# Patient Record
Sex: MALE | Race: WHITE | NOT HISPANIC OR LATINO | Employment: OTHER | URBAN - METROPOLITAN AREA
[De-identification: names, ages, dates, MRNs, and addresses within clinical notes are randomized per-mention and may not be internally consistent; named-entity substitution may affect disease eponyms.]

---

## 2017-01-25 RX ORDER — ASPIRIN 81 MG/1
TABLET ORAL
Qty: 30 TABLET | Refills: 0 | Status: SHIPPED | OUTPATIENT
Start: 2017-01-25 | End: 2017-03-28 | Stop reason: SDUPTHER

## 2017-02-13 RX ORDER — TAMSULOSIN HYDROCHLORIDE 0.4 MG/1
CAPSULE ORAL
Qty: 30 CAPSULE | Refills: 0 | Status: SHIPPED | OUTPATIENT
Start: 2017-02-13 | End: 2017-05-10

## 2017-03-27 RX ORDER — METOPROLOL TARTRATE 50 MG/1
TABLET, FILM COATED ORAL
Qty: 180 TABLET | Refills: 0 | Status: SHIPPED | OUTPATIENT
Start: 2017-03-27 | End: 2017-05-10 | Stop reason: SDUPTHER

## 2017-03-28 RX ORDER — ASPIRIN 81 MG/1
TABLET ORAL
Qty: 30 TABLET | Refills: 0 | Status: SHIPPED | OUTPATIENT
Start: 2017-03-28 | End: 2017-05-10 | Stop reason: SDUPTHER

## 2017-05-10 ENCOUNTER — OFFICE VISIT (OUTPATIENT)
Dept: FAMILY MEDICINE CLINIC | Facility: CLINIC | Age: 77
End: 2017-05-10

## 2017-05-10 VITALS
TEMPERATURE: 97.6 F | HEART RATE: 67 BPM | WEIGHT: 200.2 LBS | BODY MASS INDEX: 28.66 KG/M2 | SYSTOLIC BLOOD PRESSURE: 150 MMHG | OXYGEN SATURATION: 98 % | DIASTOLIC BLOOD PRESSURE: 90 MMHG | HEIGHT: 70 IN

## 2017-05-10 DIAGNOSIS — N40.0 BENIGN PROSTATIC HYPERPLASIA, PRESENCE OF LOWER URINARY TRACT SYMPTOMS UNSPECIFIED, UNSPECIFIED MORPHOLOGY: ICD-10-CM

## 2017-05-10 DIAGNOSIS — I10 ESSENTIAL HYPERTENSION: Primary | ICD-10-CM

## 2017-05-10 DIAGNOSIS — K21.9 GASTROESOPHAGEAL REFLUX DISEASE WITHOUT ESOPHAGITIS: ICD-10-CM

## 2017-05-10 DIAGNOSIS — J30.1 SEASONAL ALLERGIC RHINITIS DUE TO POLLEN: ICD-10-CM

## 2017-05-10 PROCEDURE — 99213 OFFICE O/P EST LOW 20 MIN: CPT | Performed by: FAMILY MEDICINE

## 2017-05-10 RX ORDER — CETIRIZINE HYDROCHLORIDE 10 MG/1
10 TABLET ORAL DAILY
Qty: 90 TABLET | Refills: 1 | Status: SHIPPED | OUTPATIENT
Start: 2017-05-10

## 2017-05-10 RX ORDER — TAMSULOSIN HYDROCHLORIDE 0.4 MG/1
1 CAPSULE ORAL DAILY
Qty: 90 CAPSULE | Refills: 1 | Status: SHIPPED | OUTPATIENT
Start: 2017-05-10 | End: 2017-12-21 | Stop reason: SDUPTHER

## 2017-05-10 RX ORDER — ASPIRIN 81 MG/1
81 TABLET ORAL DAILY
Qty: 90 TABLET | Refills: 1 | Status: SHIPPED | OUTPATIENT
Start: 2017-05-10 | End: 2017-11-27 | Stop reason: SDUPTHER

## 2017-05-10 RX ORDER — LISINOPRIL 20 MG/1
20 TABLET ORAL DAILY
Qty: 90 TABLET | Refills: 1 | Status: SHIPPED | OUTPATIENT
Start: 2017-05-10 | End: 2017-06-12

## 2017-05-10 RX ORDER — METOPROLOL TARTRATE 50 MG/1
50 TABLET, FILM COATED ORAL DAILY
Qty: 90 TABLET | Refills: 1 | Status: SHIPPED | OUTPATIENT
Start: 2017-05-10 | End: 2019-04-25 | Stop reason: SDUPTHER

## 2017-05-10 RX ORDER — OMEPRAZOLE 20 MG/1
20 CAPSULE, DELAYED RELEASE ORAL DAILY
Qty: 90 CAPSULE | Refills: 1 | Status: SHIPPED | OUTPATIENT
Start: 2017-05-10 | End: 2019-04-25

## 2017-06-10 ENCOUNTER — TELEPHONE (OUTPATIENT)
Dept: FAMILY MEDICINE CLINIC | Facility: CLINIC | Age: 77
End: 2017-06-10

## 2017-06-10 NOTE — TELEPHONE ENCOUNTER
Pt called and would like for his lisinopril 20mg once a day to be changed to 10mg BID. Can reach pt at [phone]. Send to lydia in [place] at [facility] 5/10/17

## 2017-06-12 RX ORDER — LISINOPRIL 10 MG/1
10 TABLET ORAL 2 TIMES DAILY
Qty: 60 TABLET | Refills: 5 | Status: SHIPPED | OUTPATIENT
Start: 2017-06-12 | End: 2019-04-25 | Stop reason: HOSPADM

## 2017-07-03 RX ORDER — METOPROLOL TARTRATE 50 MG/1
TABLET, FILM COATED ORAL
Qty: 180 TABLET | Refills: 0 | Status: SHIPPED | OUTPATIENT
Start: 2017-07-03 | End: 2017-10-01 | Stop reason: SDUPTHER

## 2017-09-14 RX ORDER — OMEPRAZOLE 20 MG/1
CAPSULE, DELAYED RELEASE ORAL
Qty: 90 CAPSULE | Refills: 0 | Status: SHIPPED | OUTPATIENT
Start: 2017-09-14 | End: 2019-04-25

## 2017-10-02 RX ORDER — METOPROLOL TARTRATE 50 MG/1
TABLET, FILM COATED ORAL
Qty: 180 TABLET | Refills: 0 | Status: SHIPPED | OUTPATIENT
Start: 2017-10-02 | End: 2019-04-25 | Stop reason: DRUGHIGH

## 2017-11-27 RX ORDER — ASPIRIN 81 MG/1
TABLET ORAL
Qty: 90 TABLET | Refills: 0 | Status: SHIPPED | OUTPATIENT
Start: 2017-11-27

## 2017-12-22 RX ORDER — TAMSULOSIN HYDROCHLORIDE 0.4 MG/1
CAPSULE ORAL
Qty: 90 CAPSULE | Refills: 0 | Status: SHIPPED | OUTPATIENT
Start: 2017-12-22 | End: 2019-04-25 | Stop reason: HOSPADM

## 2018-02-04 ENCOUNTER — HOSPITAL ENCOUNTER (OUTPATIENT)
Dept: GENERAL RADIOLOGY | Facility: HOSPITAL | Age: 78
Discharge: HOME | End: 2018-02-04
Attending: PHYSICAL MEDICINE & REHABILITATION | Admitting: PHYSICAL MEDICINE & REHABILITATION

## 2018-02-21 ENCOUNTER — HOSPITAL ENCOUNTER (OUTPATIENT)
Dept: OTHER | Facility: HOSPITAL | Age: 78
Setting detail: SPECIMEN
Discharge: HOME | End: 2018-02-21
Attending: UROLOGY | Admitting: UROLOGY

## 2018-12-21 ENCOUNTER — TRANSCRIBE ORDERS (OUTPATIENT)
Dept: ADMINISTRATIVE | Facility: HOSPITAL | Age: 78
End: 2018-12-21

## 2018-12-21 DIAGNOSIS — I65.23 CAROTID STENOSIS, BILATERAL: Primary | ICD-10-CM

## 2019-01-17 ENCOUNTER — HOSPITAL ENCOUNTER (OUTPATIENT)
Dept: MRI IMAGING | Facility: HOSPITAL | Age: 79
Discharge: HOME | End: 2019-01-17
Attending: SURGERY | Admitting: SURGERY

## 2019-01-17 ENCOUNTER — HOSPITAL ENCOUNTER (OUTPATIENT)
Dept: MRI IMAGING | Facility: HOSPITAL | Age: 79
Discharge: HOME | End: 2019-01-17
Attending: SURGERY

## 2019-01-17 DIAGNOSIS — I65.23 CAROTID STENOSIS, BILATERAL: ICD-10-CM

## 2019-01-17 PROCEDURE — 70544 MR ANGIOGRAPHY HEAD W/O DYE: CPT

## 2019-01-17 PROCEDURE — 70547 MR ANGIOGRAPHY NECK W/O DYE: CPT

## 2019-04-01 ENCOUNTER — HOSPITAL ENCOUNTER (EMERGENCY)
Facility: HOSPITAL | Age: 79
Discharge: HOME | End: 2019-04-01
Attending: EMERGENCY MEDICINE | Admitting: EMERGENCY MEDICINE

## 2019-04-01 ENCOUNTER — TELEPHONE (OUTPATIENT)
Dept: CARDIOLOGY | Facility: CLINIC | Age: 79
End: 2019-04-01

## 2019-04-01 ENCOUNTER — APPOINTMENT (OUTPATIENT)
Dept: CT IMAGING | Facility: HOSPITAL | Age: 79
End: 2019-04-01

## 2019-04-01 ENCOUNTER — APPOINTMENT (OUTPATIENT)
Dept: GENERAL RADIOLOGY | Facility: HOSPITAL | Age: 79
End: 2019-04-01

## 2019-04-01 VITALS
TEMPERATURE: 97.5 F | WEIGHT: 220 LBS | HEIGHT: 70 IN | SYSTOLIC BLOOD PRESSURE: 172 MMHG | DIASTOLIC BLOOD PRESSURE: 82 MMHG | OXYGEN SATURATION: 97 % | BODY MASS INDEX: 31.5 KG/M2 | HEART RATE: 70 BPM | RESPIRATION RATE: 15 BRPM

## 2019-04-01 DIAGNOSIS — R06.00 DYSPNEA, UNSPECIFIED TYPE: ICD-10-CM

## 2019-04-01 DIAGNOSIS — R11.2 NAUSEA AND VOMITING, INTRACTABILITY OF VOMITING NOT SPECIFIED, UNSPECIFIED VOMITING TYPE: Primary | ICD-10-CM

## 2019-04-01 LAB
ALBUMIN SERPL-MCNC: 4.3 G/DL (ref 3.5–5.2)
ALBUMIN/GLOB SERPL: 1.6 G/DL
ALP SERPL-CCNC: 67 U/L (ref 39–117)
ALT SERPL W P-5'-P-CCNC: 20 U/L (ref 1–41)
ANION GAP SERPL CALCULATED.3IONS-SCNC: 17 MMOL/L
AST SERPL-CCNC: 17 U/L (ref 1–40)
BACTERIA UR QL AUTO: NORMAL /HPF
BASOPHILS # BLD AUTO: 0.03 10*3/MM3 (ref 0–0.2)
BASOPHILS NFR BLD AUTO: 0.4 % (ref 0–1.5)
BILIRUB SERPL-MCNC: 0.6 MG/DL (ref 0.2–1.2)
BILIRUB UR QL STRIP: NEGATIVE
BUN BLD-MCNC: 24 MG/DL (ref 8–23)
BUN/CREAT SERPL: 18.3 (ref 7–25)
CALCIUM SPEC-SCNC: 9.2 MG/DL (ref 8.6–10.5)
CHLORIDE SERPL-SCNC: 107 MMOL/L (ref 98–107)
CLARITY UR: CLEAR
CO2 SERPL-SCNC: 20 MMOL/L (ref 22–29)
COLOR UR: YELLOW
CREAT BLD-MCNC: 1.31 MG/DL (ref 0.76–1.27)
DEPRECATED RDW RBC AUTO: 39.6 FL (ref 37–54)
EOSINOPHIL # BLD AUTO: 0.06 10*3/MM3 (ref 0–0.4)
EOSINOPHIL NFR BLD AUTO: 0.7 % (ref 0.3–6.2)
ERYTHROCYTE [DISTWIDTH] IN BLOOD BY AUTOMATED COUNT: 12.8 % (ref 12.3–15.4)
GFR SERPL CREATININE-BSD FRML MDRD: 53 ML/MIN/1.73
GLOBULIN UR ELPH-MCNC: 2.7 GM/DL
GLUCOSE BLD-MCNC: 169 MG/DL (ref 65–99)
GLUCOSE UR STRIP-MCNC: NEGATIVE MG/DL
HCT VFR BLD AUTO: 37.8 % (ref 37.5–51)
HGB BLD-MCNC: 13.4 G/DL (ref 13–17.7)
HGB UR QL STRIP.AUTO: NEGATIVE
HOLD SPECIMEN: NORMAL
HOLD SPECIMEN: NORMAL
HYALINE CASTS UR QL AUTO: NORMAL /LPF
IMM GRANULOCYTES # BLD AUTO: 0.04 10*3/MM3 (ref 0–0.05)
IMM GRANULOCYTES NFR BLD AUTO: 0.5 % (ref 0–0.5)
KETONES UR QL STRIP: NEGATIVE
LEUKOCYTE ESTERASE UR QL STRIP.AUTO: NEGATIVE
LIPASE SERPL-CCNC: 58 U/L (ref 13–60)
LYMPHOCYTES # BLD AUTO: 1.99 10*3/MM3 (ref 0.7–3.1)
LYMPHOCYTES NFR BLD AUTO: 23.8 % (ref 19.6–45.3)
MCH RBC QN AUTO: 30.7 PG (ref 26.6–33)
MCHC RBC AUTO-ENTMCNC: 35.4 G/DL (ref 31.5–35.7)
MCV RBC AUTO: 86.5 FL (ref 79–97)
MONOCYTES # BLD AUTO: 0.85 10*3/MM3 (ref 0.1–0.9)
MONOCYTES NFR BLD AUTO: 10.2 % (ref 5–12)
NEUTROPHILS # BLD AUTO: 5.4 10*3/MM3 (ref 1.4–7)
NEUTROPHILS NFR BLD AUTO: 64.4 % (ref 42.7–76)
NITRITE UR QL STRIP: NEGATIVE
NRBC BLD AUTO-RTO: 0.1 /100 WBC (ref 0–0)
NT-PROBNP SERPL-MCNC: 339.8 PG/ML (ref 5–1800)
PH UR STRIP.AUTO: 7 [PH] (ref 5–8)
PLATELET # BLD AUTO: 159 10*3/MM3 (ref 140–450)
PMV BLD AUTO: 11.6 FL (ref 6–12)
POTASSIUM BLD-SCNC: 3.9 MMOL/L (ref 3.5–5.2)
PROT SERPL-MCNC: 7 G/DL (ref 6–8.5)
PROT UR QL STRIP: ABNORMAL
RBC # BLD AUTO: 4.37 10*6/MM3 (ref 4.14–5.8)
RBC # UR: NORMAL /HPF
REF LAB TEST METHOD: NORMAL
SODIUM BLD-SCNC: 144 MMOL/L (ref 136–145)
SP GR UR STRIP: >=1.03 (ref 1–1.03)
SQUAMOUS #/AREA URNS HPF: NORMAL /HPF
TROPONIN T SERPL-MCNC: <0.01 NG/ML (ref 0–0.03)
UROBILINOGEN UR QL STRIP: ABNORMAL
WBC NRBC COR # BLD: 8.37 10*3/MM3 (ref 3.4–10.8)
WBC UR QL AUTO: NORMAL /HPF
WHOLE BLOOD HOLD SPECIMEN: NORMAL
WHOLE BLOOD HOLD SPECIMEN: NORMAL

## 2019-04-01 PROCEDURE — 85025 COMPLETE CBC W/AUTO DIFF WBC: CPT | Performed by: PHYSICIAN ASSISTANT

## 2019-04-01 PROCEDURE — 74177 CT ABD & PELVIS W/CONTRAST: CPT

## 2019-04-01 PROCEDURE — 80053 COMPREHEN METABOLIC PANEL: CPT | Performed by: PHYSICIAN ASSISTANT

## 2019-04-01 PROCEDURE — 93005 ELECTROCARDIOGRAM TRACING: CPT | Performed by: EMERGENCY MEDICINE

## 2019-04-01 PROCEDURE — 81001 URINALYSIS AUTO W/SCOPE: CPT | Performed by: PHYSICIAN ASSISTANT

## 2019-04-01 PROCEDURE — 83880 ASSAY OF NATRIURETIC PEPTIDE: CPT | Performed by: PHYSICIAN ASSISTANT

## 2019-04-01 PROCEDURE — 71045 X-RAY EXAM CHEST 1 VIEW: CPT

## 2019-04-01 PROCEDURE — 96374 THER/PROPH/DIAG INJ IV PUSH: CPT

## 2019-04-01 PROCEDURE — 83690 ASSAY OF LIPASE: CPT | Performed by: PHYSICIAN ASSISTANT

## 2019-04-01 PROCEDURE — 96376 TX/PRO/DX INJ SAME DRUG ADON: CPT

## 2019-04-01 PROCEDURE — 25010000002 IOPAMIDOL 61 % SOLUTION: Performed by: EMERGENCY MEDICINE

## 2019-04-01 PROCEDURE — 25010000002 ONDANSETRON PER 1 MG: Performed by: PHYSICIAN ASSISTANT

## 2019-04-01 PROCEDURE — 93005 ELECTROCARDIOGRAM TRACING: CPT

## 2019-04-01 PROCEDURE — 25010000002 ONDANSETRON PER 1 MG: Performed by: EMERGENCY MEDICINE

## 2019-04-01 PROCEDURE — 93010 ELECTROCARDIOGRAM REPORT: CPT | Performed by: INTERNAL MEDICINE

## 2019-04-01 PROCEDURE — 99284 EMERGENCY DEPT VISIT MOD MDM: CPT

## 2019-04-01 PROCEDURE — 84484 ASSAY OF TROPONIN QUANT: CPT | Performed by: PHYSICIAN ASSISTANT

## 2019-04-01 RX ORDER — ONDANSETRON 2 MG/ML
4 INJECTION INTRAMUSCULAR; INTRAVENOUS ONCE
Status: COMPLETED | OUTPATIENT
Start: 2019-04-01 | End: 2019-04-01

## 2019-04-01 RX ORDER — POTASSIUM CHLORIDE 750 MG/1
10 TABLET, FILM COATED, EXTENDED RELEASE ORAL DAILY
COMMUNITY

## 2019-04-01 RX ORDER — CLOPIDOGREL BISULFATE 75 MG/1
75 TABLET ORAL NIGHTLY
COMMUNITY
End: 2019-08-19 | Stop reason: SDUPTHER

## 2019-04-01 RX ORDER — ONDANSETRON 4 MG/1
4-8 TABLET, ORALLY DISINTEGRATING ORAL EVERY 8 HOURS PRN
Qty: 15 TABLET | Refills: 0 | Status: SHIPPED | OUTPATIENT
Start: 2019-04-01

## 2019-04-01 RX ORDER — PHENOL 1.4 %
600 AEROSOL, SPRAY (ML) MUCOUS MEMBRANE 2 TIMES DAILY WITH MEALS
COMMUNITY

## 2019-04-01 RX ADMIN — ONDANSETRON 4 MG: 2 INJECTION INTRAMUSCULAR; INTRAVENOUS at 17:50

## 2019-04-01 RX ADMIN — ONDANSETRON 4 MG: 2 INJECTION INTRAMUSCULAR; INTRAVENOUS at 20:51

## 2019-04-01 RX ADMIN — IOPAMIDOL 85 ML: 612 INJECTION, SOLUTION INTRAVENOUS at 19:11

## 2019-04-01 RX ADMIN — SODIUM CHLORIDE, POTASSIUM CHLORIDE, SODIUM LACTATE AND CALCIUM CHLORIDE 500 ML: 600; 310; 30; 20 INJECTION, SOLUTION INTRAVENOUS at 20:12

## 2019-04-01 NOTE — TELEPHONE ENCOUNTER
04/01/19  3:05 PM  Dave Peguero  1940  Home Phone [phone]   Mobile [phone]       Dave Peguero is a patient who will be new to our office. His daughter is calling in to get her father an apt to be seen [facility].  Daughter tells me that her father has a cardiologist in [place] and wants to change to our office.  Daughter would like the patient to be seen tomorrow as a new patient due to the patients inabilities to walk 15 feet with out becoming extremely SOA and dizzy.  She stated the patient is also having Nausea and vomiting.    The first new patient apt I have will be with GM on Wednesday, but I think this patient needs to be evaluated in the ER since he is so symptomatic.  Daughter agreeable.  She is going to keep the apt that was scheduled on 4/25/19 with Dr Galan and she will call back after patient has been seen in the ER and let us know if a sooner apt is needed.    Zara Siegel RN  Triage nurse

## 2019-04-01 NOTE — ED PROVIDER NOTES
"EMERGENCY DEPARTMENT ENCOUNTER    Room Number:  37/37  Date seen:  4/1/2019  Time seen: 5:29 PM  PCP: Kristin Krause MD  Historian: Patient, Family      HPI:  Chief Complaint: Nausea/Vomiting  Context: Dave Peguero is an 78 y.o. male with h/o atherosclerotic heart disease who presents to the ED c/o intermittent episodes of nausea and vomiting onset about 5 days ago. Pt states that his nausea worsens with lying supine and improves with sitting upright. Pt states that he has also had left-sided abdominal pain, intermittent episodes of mild chest pain ( a brief \"poking\" sensation intermittently x months), dyspnea, and lightheadedness for several weeks, which have been progressively worsening since initial onset. Pt denies diarrhea, palpitations, headache, focal weakness/numbness, speech/visual difficulties, diaphoresis, BLE edema, and bilateral/unilateral calf pain. Pt reports that his current chest pain and his other additional symptoms are dissimilar to his prior cardiac symptoms and his only symptom currently is nausea. Pt states that he called the [facility] office to establish a new cardiologist and was referred to the ER for further evaluation. There are no other complaints at this time.     Onset: Gradual in onset  Location: Gastrointestinal  Radiation: N/A  Duration: Onset about 5 days ago  Timing: Intermittent  Character: \"nausea/vomiting\"  Aggravating Factors: Nausea worsens with lying supine  Alleviating Factors: Nausea improves with sitting upright  Severity: Moderate            ALLERGIES  Benadryl [diphenhydramine]    PAST MEDICAL HISTORY  Active Ambulatory Problems     Diagnosis Date Noted   • No Active Ambulatory Problems     Resolved Ambulatory Problems     Diagnosis Date Noted   • No Resolved Ambulatory Problems     Past Medical History:   Diagnosis Date   • Arthritis    • Cancer (CMS/[facility])    • CHF (congestive heart failure) (CMS/[facility])    • Coronary artery disease    • Diabetes mellitus " (CMS/[facility])    • Hyperlipidemia    • Hypertension    • Kidney stone    • Myocardial infarction (CMS/[facility])        PAST SURGICAL HISTORY  Past Surgical History:   Procedure Laterality Date   • CORONARY ARTERY BYPASS GRAFT      TRIPLE   • DENTAL PROCEDURE      all teeth have been pulled   • FEMORAL ARTERY STENT Bilateral    • JOINT REPLACEMENT Right     knee   • PROSTATE SURGERY         FAMILY HISTORY  History reviewed. No pertinent family history.    SOCIAL HISTORY  Social History     Socioeconomic History   • Marital status:      Spouse name: Not on file   • Number of children: Not on file   • Years of education: Not on file   • Highest education level: Not on file   Tobacco Use   • Smoking status: Former Smoker     Packs/day: 1.00     Types: Cigarettes   • Smokeless tobacco: Never Used   Substance and Sexual Activity   • Alcohol use: No   • Drug use: No   • Sexual activity: Defer           REVIEW OF SYSTEMS  Review of Systems   Constitutional: Negative for chills and diaphoresis.   HENT: Negative for congestion, rhinorrhea and sore throat.    Eyes: Negative for pain and visual disturbance.   Respiratory: Positive for shortness of breath. Negative for cough.    Cardiovascular: Positive for chest pain. Negative for palpitations and leg swelling.   Gastrointestinal: Positive for abdominal pain, nausea and vomiting. Negative for diarrhea.   Genitourinary: Negative for difficulty urinating, dysuria, flank pain and frequency.   Musculoskeletal: Negative for myalgias, neck pain and neck stiffness.   Skin: Negative for rash.   Neurological: Positive for light-headedness. Negative for speech difficulty, weakness, numbness and headaches.   Psychiatric/Behavioral: Negative.    All other systems reviewed and are negative.          PHYSICAL EXAM  ED Triage Vitals   Temp Heart Rate Resp BP SpO2   04/01/19 1600 04/01/19 1600 04/01/19 1600 04/01/19 1700 04/01/19 1600   97.5 °F (36.4 °C) 83 16 171/94 97 %      Temp src Heart  Rate Source Patient Position BP Location FiO2 (%)   04/01/19 1600 04/01/19 1600 04/01/19 1700 04/01/19 1700 --   Tympanic Monitor Lying Right arm        Physical Exam   Constitutional: He is oriented to person, place, and time. No distress.   HENT:   Head: Normocephalic.   Mouth/Throat: Mucous membranes are normal.   Eyes: EOM are normal. Pupils are equal, round, and reactive to light.   Neck: Normal range of motion. Neck supple.   Cardiovascular: Normal rate, regular rhythm and normal heart sounds.   Pulmonary/Chest: Effort normal and breath sounds normal. No respiratory distress. He has no decreased breath sounds. He has no wheezes. He has no rhonchi. He has no rales.   Abdominal:   Soft  Nontender  Nondistended     Musculoskeletal: Normal range of motion.   Neurological: He is alert and oriented to person, place, and time. He has normal sensation.   Skin: Skin is warm and dry.   Psychiatric: Mood and affect normal.   Nursing note and vitals reviewed.          LAB RESULTS  Recent Results (from the past 24 hour(s))   Light Blue Top    Collection Time: 04/01/19  5:44 PM   Result Value Ref Range    Extra Tube hold for add-on    Green Top (Gel)    Collection Time: 04/01/19  5:44 PM   Result Value Ref Range    Extra Tube Hold for add-ons.    Lavender Top    Collection Time: 04/01/19  5:44 PM   Result Value Ref Range    Extra Tube hold for add-on    Gold Top - SST    Collection Time: 04/01/19  5:44 PM   Result Value Ref Range    Extra Tube Hold for add-ons.    Comprehensive Metabolic Panel    Collection Time: 04/01/19  5:44 PM   Result Value Ref Range    Glucose 169 (H) 65 - 99 mg/dL    BUN 24 (H) 8 - 23 mg/dL    Creatinine 1.31 (H) 0.76 - 1.27 mg/dL    Sodium 144 136 - 145 mmol/L    Potassium 3.9 3.5 - 5.2 mmol/L    Chloride 107 98 - 107 mmol/L    CO2 20.0 (L) 22.0 - 29.0 mmol/L    Calcium 9.2 8.6 - 10.5 mg/dL    Total Protein 7.0 6.0 - 8.5 g/dL    Albumin 4.30 3.50 - 5.20 g/dL    ALT (SGPT) 20 1 - 41 U/L    AST (SGOT)  17 1 - 40 U/L    Alkaline Phosphatase 67 39 - 117 U/L    Total Bilirubin 0.6 0.2 - 1.2 mg/dL    eGFR Non African Amer 53 (L) >60 mL/min/1.73    Globulin 2.7 gm/dL    A/G Ratio 1.6 g/dL    BUN/Creatinine Ratio 18.3 7.0 - 25.0    Anion Gap 17.0 mmol/L   Troponin    Collection Time: 04/01/19  5:44 PM   Result Value Ref Range    Troponin T <0.010 0.000 - 0.030 ng/mL   BNP    Collection Time: 04/01/19  5:44 PM   Result Value Ref Range    proBNP 339.8 5.0-1,800.0 pg/mL   Lipase    Collection Time: 04/01/19  5:44 PM   Result Value Ref Range    Lipase 58 13 - 60 U/L   CBC Auto Differential    Collection Time: 04/01/19  5:44 PM   Result Value Ref Range    WBC 8.37 3.40 - 10.80 10*3/mm3    RBC 4.37 4.14 - 5.80 10*6/mm3    Hemoglobin 13.4 13.0 - 17.7 g/dL    Hematocrit 37.8 37.5 - 51.0 %    MCV 86.5 79.0 - 97.0 fL    MCH 30.7 26.6 - 33.0 pg    MCHC 35.4 31.5 - 35.7 g/dL    RDW 12.8 12.3 - 15.4 %    RDW-SD 39.6 37.0 - 54.0 fl    MPV 11.6 6.0 - 12.0 fL    Platelets 159 140 - 450 10*3/mm3    Neutrophil % 64.4 42.7 - 76.0 %    Lymphocyte % 23.8 19.6 - 45.3 %    Monocyte % 10.2 5.0 - 12.0 %    Eosinophil % 0.7 0.3 - 6.2 %    Basophil % 0.4 0.0 - 1.5 %    Immature Grans % 0.5 0.0 - 0.5 %    Neutrophils, Absolute 5.40 1.40 - 7.00 10*3/mm3    Lymphocytes, Absolute 1.99 0.70 - 3.10 10*3/mm3    Monocytes, Absolute 0.85 0.10 - 0.90 10*3/mm3    Eosinophils, Absolute 0.06 0.00 - 0.40 10*3/mm3    Basophils, Absolute 0.03 0.00 - 0.20 10*3/mm3    Immature Grans, Absolute 0.04 0.00 - 0.05 10*3/mm3    nRBC 0.1 (H) 0.0 - 0.0 /100 WBC   Urinalysis With Microscopic If Indicated (No Culture) - Urine, Clean Catch    Collection Time: 04/01/19  7:37 PM   Result Value Ref Range    Color, UA Yellow Yellow, Straw    Appearance, UA Clear Clear    pH, UA 7.0 5.0 - 8.0    Specific Gravity, UA >=1.030 1.005 - 1.030    Glucose, UA Negative Negative    Ketones, UA Negative Negative    Bilirubin, UA Negative Negative    Blood, UA Negative Negative    Protein,  UA 30 mg/dL (1+) (A) Negative    Leuk Esterase, UA Negative Negative    Nitrite, UA Negative Negative    Urobilinogen, UA 1.0 E.U./dL 0.2 - 1.0 E.U./dL   Urinalysis, Microscopic Only - Urine, Clean Catch    Collection Time: 19  7:37 PM   Result Value Ref Range    RBC, UA 0-2 None Seen, 0-2 /HPF    WBC, UA 0-2 None Seen, 0-2 /HPF    Bacteria, UA None Seen None Seen /HPF    Squamous Epithelial Cells, UA 0-2 None Seen, 0-2 /HPF    Hyaline Casts, UA 0-2 None Seen /LPF    Methodology Automated Microscopy        I ordered the above labs and reviewed the results.        RADIOLOGY  CT Abdomen Pelvis With Contrast   Final Result    FINDINGS: The CT scan was performed as an emergency procedure through  the abdomen and pelvis with intravenous contrast and demonstrates the  followin. The lung bases are clear. The liver, spleen, pancreas, and both  adrenal glands are unremarkable. The gallbladder shows no gallstones or  wall thickening.  2. There is a partially exophytic solid-appearing mass involving the  upper pole of the left kidney measuring 1.7 x 1.7 x 2.4 cm. This is  highly suspicious for a renal cell carcinoma. The right kidney is  unremarkable.  3. There is no aortic aneurysm or retroperitoneal lymphadenopathy. There  appears to be slight narrowing at the origin of the celiac artery and  there is also very prominent calcification involving the superior  mesenteric artery resulting in moderate segmental narrowing.  4. The large and small bowel loops are normal in caliber. There is no  evidence of obstruction or inflammatory change. The appendix appears  normal. In the pelvis there is enlargement of the prostate measuring 6.5  cm.      XR Chest 1 View   Final Result    Compared to the previous chest x-ray dated 2014.     The lungs are somewhat poorly inflated, but appear free of infiltrates.  There are no pleural effusions. The heart is normal in size. Stigmata of  previous coronary artery bypass  procedure are now evident.     IMPRESSIONS: No evidence of acute disease within the chest.          I ordered the above noted radiological studies and reviewed the images on the PACS system.  Spoke with Dr. Porter (radiologist) regarding CT/MRI scan results.        MEDICATIONS GIVEN IN ER  Medications   ondansetron (ZOFRAN) injection 4 mg (4 mg Intravenous Given 4/1/19 1750)   iopamidol (ISOVUE-300) 61 % injection 100 mL (85 mL Intravenous Given by Other 4/1/19 1911)   lactated ringers bolus 500 mL (0 mL Intravenous Stopped 4/1/19 2047)   ondansetron (ZOFRAN) injection 4 mg (4 mg Intravenous Given 4/1/19 2051)           EKG  Interpreted by Dr. Gu (ER physician). Please see his documentation for the interpretation.         PROCEDURES  Procedures          PROGRESS AND CONSULTS    Progress Notes:          5:51 PM:  UA, blood work, CXR, and CT Abd ordered for further evaluation. Zofran ordered to treat for nausea.     7:50 PM:  Reviewed pt's history and workup with Dr. Gu (ER physician).  After a bedside evaluation, Dr. Gu agrees with the plan of care.    7:58 PM:  Rechecked pt. Pt is resting comfortably and appears in no acute distress. Pt denies vomiting in the ER and reports that he feels better after administration of Zofran. Informed pt that his UA is negative for acute UTI. Pt's troponin is negative. CT Abd is negative acute. However, there is incidental finding of a solid-appearing mass involving the upper pole of the left kidney present on pt's CT Abd -> Pt states that he is aware of this and is currently being followed by his urologist for this. Pt was informed of the limitations of the studies ordered in the ER. We cannot r/o all possible causes of pt's symptoms in the ER. Pt reports that he understands this and would like to f/u as an outpatient for his symptoms. Pt was strongly advised to f/u with his PMD/cardiologist in the office. Pt will be prescribed with rx for antiemetic. Strict RTER  "warnings given. Pt agrees with plan for discharge.     8:00 PM:  IV fluids ordered to hydrate pt.     Pt's O2 sat is 97% on room air.        Disposition vitals:  /82   Pulse 70   Temp 97.5 °F (36.4 °C) (Tympanic)   Resp 15   Ht 176.5 cm (69.5\")   Wt 99.8 kg (220 lb)   SpO2 97%   BMI 32.02 kg/m²           DIAGNOSIS  Final diagnoses:   Nausea and vomiting, intractability of vomiting not specified, unspecified vomiting type   Dyspnea, unspecified type           DISPOSITION  Pt discharged.    DISCHARGE    Patient discharged in stable condition.    Reviewed implications of results, diagnosis, meds, responsibility to follow up, warning signs and symptoms of possible worsening, potential complications and reasons to return to ER.    Patient/Family voiced understanding of above instructions.    Discussed plan for discharge, as there is no emergent indication for admission. Pt/family is agreeable and understands need for follow up and repeat testing. Pt is aware that discharge does not mean that nothing is wrong but it indicates no emergency is present that requires admission and they must continue care with follow-up as given below or physician of their choice.     FOLLOW-UP  Kristin Krause MD  [address]  [facility] [address]  [place] [facility]  [phone]    In 2 days      Abraham Galan MD  [address] [facility] [address]  [place] [id number] [phone]      as scheduled, sooner as needed.         Medication List      New Prescriptions    ondansetron ODT 4 MG disintegrating tablet  Commonly known as:  ZOFRAN-ODT  Take 1-2 tablets by mouth Every 8 (Eight) Hours As Needed for Nausea or   Vomiting.        Changed    * metoprolol tartrate 50 MG tablet  Commonly known as:  LOPRESSOR  Take 1 tablet by mouth Daily.  What changed:    when to take this  reasons to take this     * metoprolol tartrate 50 MG tablet  Commonly known as:  LOPRESSOR  TAKE ONE TABLET BY MOUTH TWICE DAILY  What changed:  Another medication with " the same name was changed. Make   sure you understand how and when to take each.         * This list has 2 medication(s) that are the same as other medications   prescribed for you. Read the directions carefully, and ask your doctor or   other care provider to review them with you.                  Documentation assistance provided by cindi Izquierdo for Ms. Bess Brumfield PA-C.  Information recorded by the scribe was done at my direction and has been verified and validated by me.           Tung Izquierdo  04/01/19 [id number]       Bess Brumfield PA  04/02/19 [id number]

## 2019-04-02 NOTE — DISCHARGE INSTRUCTIONS
Stay well-hydrated.  Recheck with your PCP in 1-2 days.  Take the medications as prescribed.  Return to the ER for severe pain, intractable vomiting, fever >100.4, worsening shortness of breath, chest pain, new or worsening symptoms, any concerns.

## 2019-04-02 NOTE — ED PROVIDER NOTES
Pt is a 78 y.o. male who presents to the ED complaining of N/V that began 5 days ago, worse today. Pt states his nausea has been constant. Pt hasn't been taking anything for nausea. Pt also c/o SOA, VALERA, and dizziness (unsteady when walking). Pt admits to not drinking as much as he should. Pt denies any CP currently. Dx with DM last year prior to MI. Pt states he doesn't check his glucose levels.     On exam,  GENERAL: not distressed  HENT: nares patent  EYES: no scleral icterus  CV: regular rhythm, regular rate  RESPIRATORY: normal effort, CTAB  ABDOMEN: soft, non-tender  MUSCULOSKELETAL: no deformity  NEURO: alert, ORTEGA, FC  SKIN: warm, dry     Vital signs and nursing notes reviewed.    Labs and imaging reviewed.   CXR is negative acute.     EKG          EKG time: 1619  Rhythm/Rate: sinus rate 78  P waves and SC: sinus  QRS, axis: LVH   ST and T waves: normal      Interpreted Contemporaneously by me, independently viewed          Plan: discharge     She presents with nausea and vomiting.  I see no acute pathology.  He has a normal neuro exam.  No chest pain.  EKG and troponin are negative.  CT scan of the abdomen is negative.  I do suspect that patient has potentially some degree of gastroparesis causing his nausea.  Also believe that his dyspnea is related to some deconditioning.  The shortness of breath has been ongoing for quite some time now.    2009  Initial encounter. Notified pt of lab work results. Informed pt that because of Hx of DM he could be developing gastroparesis.       MD ATTESTATION NOTE    The JOSE and I have discussed this patient's history, physical exam, and treatment plan.  I have reviewed the documentation and personally had a face to face interaction with the patient. I affirm the documentation and agree with the treatment and plan.  The attached note describes my personal findings.      Documentation assistance provided by cindi Ballard for Dr. Gu. Information recorded by the  scribe was done at my direction and has been verified and validated by me.             Gallo Ballard  04/01/19 2016       Robi Gu II, MD  04/01/19 [id number]

## 2019-04-09 ENCOUNTER — INPATIENT HOSPITAL (OUTPATIENT)
Dept: URBAN - METROPOLITAN AREA HOSPITAL 84 | Facility: HOSPITAL | Age: 79
End: 2019-04-09

## 2019-04-09 DIAGNOSIS — N28.89 OTHER SPECIFIED DISORDERS OF KIDNEY AND URETER: ICD-10-CM

## 2019-04-09 DIAGNOSIS — E87.6 HYPOKALEMIA: ICD-10-CM

## 2019-04-09 DIAGNOSIS — R10.32 LEFT LOWER QUADRANT PAIN: ICD-10-CM

## 2019-04-09 DIAGNOSIS — R10.12 LEFT UPPER QUADRANT PAIN: ICD-10-CM

## 2019-04-09 DIAGNOSIS — R11.2 NAUSEA WITH VOMITING, UNSPECIFIED: ICD-10-CM

## 2019-04-09 DIAGNOSIS — R12 HEARTBURN: ICD-10-CM

## 2019-04-09 PROCEDURE — 99222 1ST HOSP IP/OBS MODERATE 55: CPT | Performed by: NURSE PRACTITIONER

## 2019-04-10 ENCOUNTER — ON CAMPUS - OUTPATIENT (OUTPATIENT)
Dept: URBAN - METROPOLITAN AREA HOSPITAL 85 | Facility: HOSPITAL | Age: 79
End: 2019-04-10

## 2019-04-10 DIAGNOSIS — K22.70 BARRETT'S ESOPHAGUS WITHOUT DYSPLASIA: ICD-10-CM

## 2019-04-10 DIAGNOSIS — R11.2 NAUSEA WITH VOMITING, UNSPECIFIED: ICD-10-CM

## 2019-04-10 DIAGNOSIS — K44.9 DIAPHRAGMATIC HERNIA WITHOUT OBSTRUCTION OR GANGRENE: ICD-10-CM

## 2019-04-10 PROCEDURE — 43239 EGD BIOPSY SINGLE/MULTIPLE: CPT | Performed by: INTERNAL MEDICINE

## 2019-04-25 ENCOUNTER — OFFICE VISIT (OUTPATIENT)
Dept: CARDIOLOGY | Facility: CLINIC | Age: 79
End: 2019-04-25

## 2019-04-25 VITALS
BODY MASS INDEX: 32.14 KG/M2 | WEIGHT: 217 LBS | HEART RATE: 65 BPM | DIASTOLIC BLOOD PRESSURE: 80 MMHG | SYSTOLIC BLOOD PRESSURE: 110 MMHG | HEIGHT: 69 IN

## 2019-04-25 DIAGNOSIS — G47.33 OBSTRUCTIVE SLEEP APNEA SYNDROME: ICD-10-CM

## 2019-04-25 DIAGNOSIS — I65.23 ASYMPTOMATIC CAROTID ARTERY STENOSIS, BILATERAL: ICD-10-CM

## 2019-04-25 DIAGNOSIS — I73.9 PVD (PERIPHERAL VASCULAR DISEASE) WITH CLAUDICATION (HCC): ICD-10-CM

## 2019-04-25 DIAGNOSIS — I10 ESSENTIAL HYPERTENSION: ICD-10-CM

## 2019-04-25 DIAGNOSIS — E11.59 TYPE 2 DIABETES MELLITUS WITH OTHER CIRCULATORY COMPLICATION, WITHOUT LONG-TERM CURRENT USE OF INSULIN (HCC): ICD-10-CM

## 2019-04-25 DIAGNOSIS — I25.810 CORONARY ARTERY DISEASE INVOLVING CORONARY BYPASS GRAFT OF NATIVE HEART WITHOUT ANGINA PECTORIS: Primary | ICD-10-CM

## 2019-04-25 DIAGNOSIS — E78.2 MIXED HYPERLIPIDEMIA: ICD-10-CM

## 2019-04-25 PROCEDURE — 99204 OFFICE O/P NEW MOD 45 MIN: CPT | Performed by: INTERNAL MEDICINE

## 2019-04-25 PROCEDURE — 93000 ELECTROCARDIOGRAM COMPLETE: CPT | Performed by: INTERNAL MEDICINE

## 2019-04-25 NOTE — PROGRESS NOTES
Date of Office Visit: 19  Encounter Provider: Abraham Galan MD  Place of Service: [facility] CARDIOLOGY  Patient Name: Dave Peguero  :1940  Referral Provider:Referring, Self      Chief Complaint   Patient presents with   • Fatigue     History of Present Illness  Is a very pleasant 78-year-old gentleman with a very complicated history but has a history of peripheral vascular disease previous lower extremity bypass and stenting.  Previous diabetes mellitus, hypertension, hyperlipidemia, coronary disease with chronic bypass grafting, carotid artery stenosis and breast cancer.  He said peripheral vascular disease for some time and is followed with vascular surgery in  he had a right superficial femoral artery to peroneal artery in situ saphenous vein bypass grafting.  He then in roughly 2018 apparently had stenting of both femoral arteries by a different physician.  But in 2018 he presented with some chest pressure or shortness of breath and apparently having a myocardial infarction.  He was found to have severe three-vessel and critical left main disease.  He underwent coronary bypass grafting with left internal mammary graft to left anterior descending, vein graft to the obtuse marginal and vein graft to the acute marginal branch of the right coronary artery with laser TMR of the diaphragmatic surface of the left ventricle..  He has since then has not really done all that well.  He says he is just tired fatigued and short of breath all the time and energy all the time.  He can walk or try to do some kind of activity and he can barely make it down the ol[place] but it seems like he is probably limited by his leg discomfort and fatigue in his legs.  He does get some shortness of breath but no orthopnea or PND.  He said no further chest pain or pressure.  No palpitations near syncope or syncope no abrupt loss of vision, paralysis, or paresthesia.  No  blood in his stool or black tarry stools.  He also within the last year has had a couple of prostate surgeries for prostatic enlargement.  But again the biggest complaint he has is just fatigue.  He states tired from the minute he gets up in the morning he does snore at night.          Past Medical History:   Diagnosis Date   • Arthritis    • Atherosclerosis of autologous vein bypass graft of right lower extremity with intermittent claudication (CMS/HCC)    • Atherosclerosis of native arteries of extremities with intermittent claudication, left leg (CMS/HCC)    • Cancer (CMS/HCC)     breast cancer hx   • Carotid artery stenosis    • Cerebral vascular disease    • CHF (congestive heart failure) (CMS/HCC)    • Coronary artery disease    • Diabetes mellitus (CMS/HCC)     type 1   • Hyperlipidemia    • Hypertension    • Kidney stone    • Myocardial infarction (CMS/[facility])     x 3         Past Surgical History:   Procedure Laterality Date   • CARDIAC CATHETERIZATION     • CORONARY ARTERY BYPASS GRAFT      TRIPLE   • DENTAL PROCEDURE      all teeth have been pulled   • FEMORAL ARTERY STENT Bilateral    • JOINT REPLACEMENT Right     knee   • PROSTATE SURGERY           Current Outpatient Medications on File Prior to Visit   Medication Sig Dispense Refill   • aspirin 81 MG EC tablet TAKE 1 TABLET BY MOUTH DAILY 90 tablet 0   • Atorvastatin Calcium (LIPITOR PO) Take  by mouth Daily With Breakfast.     • calcium carbonate (OS-RUTH) 600 MG tablet Take 600 mg by mouth 2 (Two) Times a Day With Meals.     • cetirizine (zyrTEC) 10 MG tablet Take 1 tablet by mouth Daily. 90 tablet 1   • clopidogrel (PLAVIX) 75 MG tablet Take 75 mg by mouth Every Night.     • FUROSEMIDE PO Take  by mouth. ONLY takes med on Mon/Weds/Fri.     • HYDRALAZINE HCL PO Take  by mouth 3 (Three) Times a Day.     • METFORMIN HCL PO Take  by mouth 2 (Two) Times a Day.     • metoprolol tartrate (LOPRESSOR) 25 MG tablet Take 25 mg by mouth 3 (Three) Times a Day.     •  Multiple Vitamins-Minerals (MULTIVITAMIN ADULT PO) Take  by mouth Daily.     • ondansetron ODT (ZOFRAN-ODT) 4 MG disintegrating tablet Take 1-2 tablets by mouth Every 8 (Eight) Hours As Needed for Nausea or Vomiting. 15 tablet 0   • PANTOPRAZOLE SODIUM PO Take  by mouth 2 (Two) Times a Day.     • potassium chloride (K-DUR) 10 MEQ CR tablet Take 10 mEq by mouth Daily. Takes half a tablet on the mornings of Mon/Weds/Fri ONLY.     • [DISCONTINUED] metoprolol tartrate (LOPRESSOR) 50 MG tablet TAKE ONE TABLET BY MOUTH TWICE DAILY 180 tablet 0   • [DISCONTINUED] amLODIPine Besylate (NORVASC PO) Take  by mouth 2 (Two) Times a Day.     • [DISCONTINUED] lisinopril (PRINIVIL,ZESTRIL) 10 MG tablet Take 1 tablet by mouth 2 (Two) Times a Day. 60 tablet 5   • [DISCONTINUED] metoprolol tartrate (LOPRESSOR) 50 MG tablet Take 1 tablet by mouth Daily. (Patient taking differently: Take 50 mg by mouth Daily As Needed.) 90 tablet 1   • [DISCONTINUED] omeprazole (priLOSEC) 20 MG capsule Take 1 capsule by mouth Daily. 90 capsule 1   • [DISCONTINUED] omeprazole (priLOSEC) 20 MG capsule TAKE ONE CAPSULE BY MOUTH DAILY 90 capsule 0   • [DISCONTINUED] tamsulosin (FLOMAX) 0.4 MG capsule 24 hr capsule TAKE 1 CAPSULE BY MOUTH DAILY 90 capsule 0     No current facility-administered medications on file prior to visit.          Social History     Socioeconomic History   • Marital status:      Spouse name: Not on file   • Number of children: Not on file   • Years of education: Not on file   • Highest education level: Not on file   Tobacco Use   • Smoking status: Former Smoker     Packs/day: 1.00     Types: Cigarettes     Last attempt to quit: 2018     Years since quittin.3   • Smokeless tobacco: Never Used   Substance and Sexual Activity   • Alcohol use: No   • Drug use: No   • Sexual activity: Defer   Lifestyle   • Physical activity:     Days per week: 3 days     Minutes per session: 60 min   • Stress: Not on file       Family History  "  Problem Relation Age of Onset   • COPD Mother    • Heart disease Mother    • Stroke Mother    • Hypertension Mother    • Cancer Father          Review of Systems   Constitution: Positive for malaise/fatigue. Negative for decreased appetite, diaphoresis, fever, weakness, weight gain and weight loss.   HENT: Negative for congestion, hearing loss, nosebleeds and tinnitus.    Eyes: Negative for blurred vision, double vision, vision loss in left eye, vision loss in right eye and visual disturbance.   Cardiovascular:        As noted in HPI   Respiratory:        As noted HPI   Endocrine: Negative for cold intolerance and heat intolerance.   Hematologic/Lymphatic: Negative for bleeding problem. Does not bruise/bleed easily.   Skin: Negative for color change, flushing, itching and rash.   Musculoskeletal: Negative for arthritis, back pain, joint pain, joint swelling, muscle weakness and myalgias.   Gastrointestinal: Positive for nausea. Negative for bloating, abdominal pain, constipation, diarrhea, dysphagia, heartburn, hematemesis, hematochezia, melena and vomiting.   Genitourinary: Positive for frequency. Negative for bladder incontinence, dysuria, nocturia and urgency.   Neurological: Negative for dizziness, focal weakness, headaches, light-headedness, loss of balance, numbness, paresthesias and vertigo.   Psychiatric/Behavioral: Negative for depression, memory loss and substance abuse. The patient is nervous/anxious.        Procedures      ECG 12 Lead  Date/Time: 4/25/2019 9:29 AM  Performed by: Abraham Galan MD  Authorized by: Abraham Galan MD   Comparison: compared with previous ECG   Similar to previous ECG  Rhythm: sinus rhythm  Rate: normal  QRS axis: normal                  Objective:    /80 (BP Location: Right arm)   Pulse 65   Ht 175.3 cm (69\")   Wt 98.4 kg (217 lb)   BMI 32.05 kg/m²        Physical Exam  Physical Exam   Constitutional: He is oriented to person, place, and time. He appears " well-developed and well-nourished. No distress.   HENT:   Head: Normocephalic.   Eyes: Conjunctivae are normal. Pupils are equal, round, and reactive to light. No scleral icterus.   Neck: Normal carotid pulses, no hepatojugular reflux and no JVD present. Carotid bruit is not present. No tracheal deviation, no edema and no erythema present. No thyromegaly present.   Cardiovascular: Normal rate, regular rhythm, S1 normal, S2 normal, normal heart sounds and intact distal pulses.  No extrasystoles are present. PMI is not displaced. Exam reveals no gallop, no distant heart sounds and no friction rub.   No murmur heard.  Pulses:       Carotid pulses are 2+ on the right side with bruit, and 2+ on the left side with bruit.       Radial pulses are 2+ on the right side, and 2+ on the left side.        Femoral pulses are 2+ on the right side, and 1+ on the left side.       Dorsalis pedis pulses are 1+ on the right side, and 1+ on the left side.        Posterior tibial pulses are 1+ on the right side, and 1+ on the left side.   Pulmonary/Chest: Effort normal and breath sounds normal. No respiratory distress. He has no decreased breath sounds. He has no wheezes. He has no rhonchi. He has no rales. He exhibits no tenderness.   Abdominal: Soft. Bowel sounds are normal. He exhibits no distension and no mass. There is no hepatosplenomegaly. There is no tenderness. There is no rebound and no guarding.   Musculoskeletal: He exhibits no edema, tenderness or deformity.   Neurological: He is alert and oriented to person, place, and time.   Skin: Skin is warm and dry. No rash noted. He is not diaphoretic. No cyanosis or erythema. No pallor. Nails show no clubbing.   Psychiatric: He has a normal mood and affect. His speech is normal and behavior is normal. Judgment and thought content normal.           Assessment:   1.  78-year-old gentleman with history of severe coronary disease status post coronary artery bypass grafting in January  2018.  We are trying to get records.  Until then he will return for an echocardiogram and make further recommendations pending that.  2.  Hypertension patient blood pressure on the low side we will follow this this could partially be why he is fatigued.  3.  Hyperlipidemia on atorvastatin unclear that dose yet should be on at least 40 to 80 mg.  4.  Diabetes mellitus followed by PCP.  5.  Peripheral vascular disease previous right lower extremity bypass.  Previous stenting bilateral femoral arteries follows with vascular surgery it is possible that this could be the cause of his symptoms.  6.  Carotid artery stenosis again follows with vascular had recent MRA per them that showed moderate bilateral disease but does have a chronically occluded left vertebral artery.  7.  History of breast cancer status post surgery.  8.  Marked fatigue weakness really do not have a clear handle on this.  He could have sleep apnea he is hypersomnolent and snores he will return for a sleep study.  I guess it could be due to heart failure he is returning for an echocardiogram in addition were trying to work on records.  And finally it may just be due to his peripheral vascular disease that could be limiting him.         Plan:

## 2019-05-07 ENCOUNTER — TELEPHONE (OUTPATIENT)
Dept: CARDIOLOGY | Facility: CLINIC | Age: 79
End: 2019-05-07

## 2019-05-07 ENCOUNTER — HOSPITAL ENCOUNTER (OUTPATIENT)
Dept: CARDIOLOGY | Facility: HOSPITAL | Age: 79
Discharge: HOME | End: 2019-05-07
Admitting: INTERNAL MEDICINE

## 2019-05-07 VITALS
OXYGEN SATURATION: 96 % | HEIGHT: 69 IN | SYSTOLIC BLOOD PRESSURE: 120 MMHG | WEIGHT: 217 LBS | DIASTOLIC BLOOD PRESSURE: 84 MMHG | BODY MASS INDEX: 32.14 KG/M2 | HEART RATE: 72 BPM

## 2019-05-07 PROCEDURE — 93306 TTE W/DOPPLER COMPLETE: CPT

## 2019-05-07 PROCEDURE — 25010000002 PERFLUTREN (DEFINITY) 8.476 MG IN SODIUM CHLORIDE 0.9 % 10 ML INJECTION: Performed by: INTERNAL MEDICINE

## 2019-05-07 PROCEDURE — 93306 TTE W/DOPPLER COMPLETE: CPT | Performed by: INTERNAL MEDICINE

## 2019-05-07 RX ADMIN — PERFLUTREN 1.5 ML: 6.52 INJECTION, SUSPENSION INTRAVENOUS at 09:50

## 2019-05-08 LAB
AORTIC DIMENSIONLESS INDEX: 0.6 (DI)
ASCENDING AORTA: 3.3 CM
BH CV ECHO MEAS - ACS: 1.2 CM
BH CV ECHO MEAS - AO MAX PG (FULL): 12.3 MMHG
BH CV ECHO MEAS - AO MAX PG: 18.1 MMHG
BH CV ECHO MEAS - AO MEAN PG (FULL): 6.9 MMHG
BH CV ECHO MEAS - AO MEAN PG: 10 MMHG
BH CV ECHO MEAS - AO ROOT AREA (BSA CORRECTED): 1.8
BH CV ECHO MEAS - AO ROOT AREA: 12.3 CM^2
BH CV ECHO MEAS - AO ROOT DIAM: 4 CM
BH CV ECHO MEAS - AO V2 MAX: 212.8 CM/SEC
BH CV ECHO MEAS - AO V2 MEAN: 147 CM/SEC
BH CV ECHO MEAS - AO V2 VTI: 46.1 CM
BH CV ECHO MEAS - AVA(I,A): 1.9 CM^2
BH CV ECHO MEAS - AVA(I,D): 1.9 CM^2
BH CV ECHO MEAS - AVA(V,A): 1.9 CM^2
BH CV ECHO MEAS - AVA(V,D): 1.9 CM^2
BH CV ECHO MEAS - BSA(HAYCOCK): 2.2 M^2
BH CV ECHO MEAS - BSA: 2.1 M^2
BH CV ECHO MEAS - BZI_BMI: 32 KILOGRAMS/M^2
BH CV ECHO MEAS - BZI_METRIC_HEIGHT: 175.3 CM
BH CV ECHO MEAS - BZI_METRIC_WEIGHT: 98.4 KG
BH CV ECHO MEAS - EDV(MOD-SP2): 82 ML
BH CV ECHO MEAS - EDV(MOD-SP4): 107 ML
BH CV ECHO MEAS - EDV(TEICH): 56.2 ML
BH CV ECHO MEAS - EF(CUBED): 76.3 %
BH CV ECHO MEAS - EF(MOD-BP): 70 %
BH CV ECHO MEAS - EF(MOD-SP2): 68.3 %
BH CV ECHO MEAS - EF(MOD-SP4): 71 %
BH CV ECHO MEAS - EF(TEICH): 69.2 %
BH CV ECHO MEAS - ESV(MOD-SP2): 26 ML
BH CV ECHO MEAS - ESV(MOD-SP4): 31 ML
BH CV ECHO MEAS - ESV(TEICH): 17.3 ML
BH CV ECHO MEAS - FS: 38.1 %
BH CV ECHO MEAS - IVS/LVPW: 1.1
BH CV ECHO MEAS - IVSD: 1.4 CM
BH CV ECHO MEAS - LAT PEAK E' VEL: 10 CM/SEC
BH CV ECHO MEAS - LV DIASTOLIC VOL/BSA (35-75): 50 ML/M^2
BH CV ECHO MEAS - LV MASS(C)D: 168 GRAMS
BH CV ECHO MEAS - LV MASS(C)DI: 78.6 GRAMS/M^2
BH CV ECHO MEAS - LV MAX PG: 5.8 MMHG
BH CV ECHO MEAS - LV MEAN PG: 3.1 MMHG
BH CV ECHO MEAS - LV SYSTOLIC VOL/BSA (12-30): 14.5 ML/M^2
BH CV ECHO MEAS - LV V1 MAX: 120.8 CM/SEC
BH CV ECHO MEAS - LV V1 MEAN: 82.6 CM/SEC
BH CV ECHO MEAS - LV V1 VTI: 27.3 CM
BH CV ECHO MEAS - LVIDD: 3.6 CM
BH CV ECHO MEAS - LVIDS: 2.3 CM
BH CV ECHO MEAS - LVLD AP2: 8.1 CM
BH CV ECHO MEAS - LVLD AP4: 8.4 CM
BH CV ECHO MEAS - LVLS AP2: 7 CM
BH CV ECHO MEAS - LVLS AP4: 6.7 CM
BH CV ECHO MEAS - LVOT AREA (M): 3.1 CM^2
BH CV ECHO MEAS - LVOT AREA: 3.3 CM^2
BH CV ECHO MEAS - LVOT DIAM: 2 CM
BH CV ECHO MEAS - LVPWD: 1.3 CM
BH CV ECHO MEAS - MED PEAK E' VEL: 9 CM/SEC
BH CV ECHO MEAS - MV A DUR: 0.15 SEC
BH CV ECHO MEAS - MV A MAX VEL: 116.4 CM/SEC
BH CV ECHO MEAS - MV DEC TIME: 0.27 SEC
BH CV ECHO MEAS - MV E MAX VEL: 74.2 CM/SEC
BH CV ECHO MEAS - MV E/A: 0.64
BH CV ECHO MEAS - MV MAX PG: 5.7 MMHG
BH CV ECHO MEAS - MV MEAN PG: 1.9 MMHG
BH CV ECHO MEAS - MV P1/2T MAX VEL: 90.3 CM/SEC
BH CV ECHO MEAS - MV V2 MAX: 119 CM/SEC
BH CV ECHO MEAS - MV V2 MEAN: 64.1 CM/SEC
BH CV ECHO MEAS - MV V2 VTI: 34.8 CM
BH CV ECHO MEAS - MVA P1/2T LCG: 2.4 CM^2
BH CV ECHO MEAS - MVA(VTI): 2.6 CM^2
BH CV ECHO MEAS - PA MAX PG: 5 MMHG
BH CV ECHO MEAS - PA V2 MAX: 112.1 CM/SEC
BH CV ECHO MEAS - PULM A REVS DUR: 0.12 SEC
BH CV ECHO MEAS - PULM A REVS VEL: 26.2 CM/SEC
BH CV ECHO MEAS - PULM DIAS VEL: 37.2 CM/SEC
BH CV ECHO MEAS - PULM S/D: 1.5
BH CV ECHO MEAS - PULM SYS VEL: 54.5 CM/SEC
BH CV ECHO MEAS - SI(AO): 264.3 ML/M^2
BH CV ECHO MEAS - SI(CUBED): 17.3 ML/M^2
BH CV ECHO MEAS - SI(LVOT): 41.9 ML/M^2
BH CV ECHO MEAS - SI(MOD-SP2): 26.2 ML/M^2
BH CV ECHO MEAS - SI(MOD-SP4): 35.5 ML/M^2
BH CV ECHO MEAS - SI(TEICH): 18.2 ML/M^2
BH CV ECHO MEAS - SV(AO): 565.1 ML
BH CV ECHO MEAS - SV(CUBED): 37 ML
BH CV ECHO MEAS - SV(LVOT): 89.7 ML
BH CV ECHO MEAS - SV(MOD-SP2): 56 ML
BH CV ECHO MEAS - SV(MOD-SP4): 76 ML
BH CV ECHO MEAS - SV(TEICH): 38.9 ML
BH CV ECHO MEAS - TAPSE (>1.6): 1.8 CM2
BH CV ECHO MEASUREMENTS AVERAGE E/E' RATIO: 7.81
BH CV XLRA - RV BASE: 2.7 CM
BH CV XLRA - TDI S': 11 CM/SEC
LEFT ATRIUM VOLUME INDEX: 38 ML/M2
MAXIMAL PREDICTED HEART RATE: 140 BPM
SINUS: 4.3 CM
STJ: 3.2 CM
STRESS TARGET HR: 119 BPM

## 2019-05-13 ENCOUNTER — OFFICE VISIT (OUTPATIENT)
Dept: SLEEP MEDICINE | Facility: HOSPITAL | Age: 79
End: 2019-05-13

## 2019-05-13 VITALS
HEART RATE: 63 BPM | BODY MASS INDEX: 32.73 KG/M2 | WEIGHT: 221 LBS | HEIGHT: 69 IN | OXYGEN SATURATION: 97 % | DIASTOLIC BLOOD PRESSURE: 79 MMHG | SYSTOLIC BLOOD PRESSURE: 166 MMHG

## 2019-05-13 DIAGNOSIS — G47.33 OBSTRUCTIVE SLEEP APNEA SYNDROME: Primary | ICD-10-CM

## 2019-05-13 DIAGNOSIS — I25.2 HISTORY OF MI (MYOCARDIAL INFARCTION): ICD-10-CM

## 2019-05-13 DIAGNOSIS — I50.42 CHRONIC COMBINED SYSTOLIC AND DIASTOLIC CONGESTIVE HEART FAILURE (HCC): ICD-10-CM

## 2019-05-13 PROCEDURE — G0463 HOSPITAL OUTPT CLINIC VISIT: HCPCS

## 2019-05-13 RX ORDER — METOPROLOL SUCCINATE 25 MG/1
TABLET, EXTENDED RELEASE ORAL
Qty: 180 TABLET | Refills: 4 | Status: SHIPPED | OUTPATIENT
Start: 2019-05-13 | End: 2019-08-10 | Stop reason: SDUPTHER

## 2019-05-13 NOTE — PROGRESS NOTES
[facility]  Telephone: [phone] / Fax: [phone] [place]  Telephone: [phone] / Fax: [phone] [place] [address]    Referring Physician: Dr. Galan  PCP: Kristin Krause  Reason for consult:  sleep apnea    Dave Peguero is a 78 y.o.male  was seen in the [facility] Disorders Center today for evaluation of sleep apnea. Wife reports he snores at night.  Cardiologist recommended obstructive sleep apnea evaluation in view of significant daytime sleepiness and multiple comorbid conditions such as myocardial infarction and congestive heart failure.  No prior obstructive sleep apnea evaluation has been performed today.  Patient goes to bed at 9 PM and up at 5:30 AM.  He has multiple arousals from sleep due to unknown reasons.  He denies gasping for breath episodes or choking.    Social history, former smoker age [age], no tobacco.    Review of systems, positive for frequent urination.  Rest is negative.    ESS score not completed    Dave Peguero  has a past medical history of Arthritis, Atherosclerosis of autologous vein bypass graft of right lower extremity with intermittent claudication (CMS/HCC), Atherosclerosis of native arteries of extremities with intermittent claudication, left leg (CMS/HCC), Cancer (CMS/HCC), Carotid artery stenosis, Cerebral vascular disease, CHF (congestive heart failure) (CMS/HCC), Coronary artery disease, Diabetes mellitus (CMS/HCC), Hyperlipidemia, Hypertension, Kidney stone, and Myocardial infarction (CMS/HCC).    Current Medications:    Current Outpatient Medications:   •  aspirin 81 MG EC tablet, TAKE 1 TABLET BY MOUTH DAILY, Disp: 90 tablet, Rfl: 0  •  Atorvastatin Calcium (LIPITOR PO), Take  by mouth Daily With Breakfast., Disp: , Rfl:   •  calcium carbonate (OS-RUTH) 600 MG tablet, Take 600 mg by mouth 2 (Two) Times a Day With Meals., Disp: , Rfl:   •  cetirizine (zyrTEC) 10 MG tablet, Take 1 tablet by mouth Daily., Disp: 90 tablet, Rfl: 1  •   "clopidogrel (PLAVIX) 75 MG tablet, Take 75 mg by mouth Every Night., Disp: , Rfl:   •  FUROSEMIDE PO, Take  by mouth. ONLY takes med on Mon/Weds/Fri., Disp: , Rfl:   •  HYDRALAZINE HCL PO, Take  by mouth 3 (Three) Times a Day., Disp: , Rfl:   •  METFORMIN HCL PO, Take  by mouth 2 (Two) Times a Day., Disp: , Rfl:   •  metoprolol succinate XL (TOPROL-XL) 25 MG 24 hr tablet, TAKE 3 TABLETS BY MOUTH TWICE DAILY, Disp: 180 tablet, Rfl: 4  •  metoprolol tartrate (LOPRESSOR) 25 MG tablet, Take 25 mg by mouth 3 (Three) Times a Day., Disp: , Rfl:   •  Multiple Vitamins-Minerals (MULTIVITAMIN ADULT PO), Take  by mouth Daily., Disp: , Rfl:   •  ondansetron ODT (ZOFRAN-ODT) 4 MG disintegrating tablet, Take 1-2 tablets by mouth Every 8 (Eight) Hours As Needed for Nausea or Vomiting., Disp: 15 tablet, Rfl: 0  •  PANTOPRAZOLE SODIUM PO, Take  by mouth 2 (Two) Times a Day., Disp: , Rfl:   •  potassium chloride (K-DUR) 10 MEQ CR tablet, Take 10 mEq by mouth Daily. Takes half a tablet on the mornings of Mon/Weds/Fri ONLY., Disp: , Rfl:     I have reviewed Past Medical History, Past Surgical History, Medication List, Social History and Family History as entered in Sleep Questionnaire and EPIC.    ESS  not completed   Vital Signs /79   Pulse 63   Ht 175.3 cm (69\")   Wt 100 kg (221 lb)   SpO2 97%   BMI 32.64 kg/m²  Body mass index is 32.64 kg/m².    General Alert and oriented. No acute distress noted   Pharynx/Throat Class III Mallampati airway, large tongue, no evidence of redundant lateral pharyngeal tissue. No oral lesions. No thrush. Moist mucous membranes.   Head Normocephalic. Symmetrical. Atraumatic.    Nose No septal deviation. No drainage   Chest Wall Normal shape. Symmetric expansion with respiration. No tenderness.   Neck Trachea midline, no thyromegaly or adenopathy    Lungs Clear to auscultation bilaterally. No wheezes. No rhonchi. No rales. Respirations regular, even and unlabored.   Heart Regular rhythm and " normal rate. Normal S1 and S2. No murmur   Abdomen Soft, non-tender and non-distended. Normal bowel sounds. No masses.   Extremities Moves all extremities well. No edema   Psychiatric Normal mood and affect.          Impression:  1. Obstructive sleep apnea syndrome    2. Chronic combined systolic and diastolic congestive heart failure (CMS/HCC)    3. History of MI (myocardial infarction)          Plan:  I discussed the pathophysiology of obstructive sleep apnea with the patient.  We discussed the adverse outcomes associated with untreated sleep-disordered breathing.  We discussed treatment modalities of obstructive sleep apnea including CPAP device as well as oral mandibular advancement device. Sleep study will be scheduled to establish definitive diagnosis of sleep disorder breathing.  Weight loss will be strongly beneficial in order to reduce the severity of sleep-disordered breathing.  Patient has narrow oropharyngeal structure.  Caution during activities that require prolonged concentration is strongly advised.  Patient will be notified of sleep study results after sleep study is completed. He is not a candidate for oral appliance due to false teeth. He voices skepticism about using CPAP device, even if [place] is confirmed on a sleep study(due to advanced age).  Prescription for Ambien was provided to bring to the Sleep lab to improve sleep efficiency.  Patient was asked to not take the Ambien at home. Discussed plan with daughter who was present today during visit.    Thank you for allowing me to participate in your patient's care.    The patient will follow up with Dr. Faustin after completion of polysomnography.    TOREY Jaffe  [facility]  Phone: [phone]      Part of this note may be an electronic transcription/translation of spoken language to printed text using the Dragon Dictation System. Some errors may exist even though the document was edited.

## 2019-06-17 RX ORDER — HYDRALAZINE HYDROCHLORIDE 10 MG/1
10 TABLET, FILM COATED ORAL 3 TIMES DAILY
Qty: 90 TABLET | Refills: 2 | Status: SHIPPED | OUTPATIENT
Start: 2019-06-17 | End: 2019-09-12 | Stop reason: SDUPTHER

## 2019-06-27 ENCOUNTER — APPOINTMENT (OUTPATIENT)
Dept: SLEEP MEDICINE | Facility: HOSPITAL | Age: 79
End: 2019-06-27

## 2019-08-12 RX ORDER — METOPROLOL SUCCINATE 25 MG/1
TABLET, EXTENDED RELEASE ORAL
Qty: 540 TABLET | Refills: 1 | Status: SHIPPED | OUTPATIENT
Start: 2019-08-12 | End: 2020-02-07

## 2019-08-19 ENCOUNTER — TELEPHONE (OUTPATIENT)
Dept: CARDIOLOGY | Facility: CLINIC | Age: 79
End: 2019-08-19

## 2019-08-19 RX ORDER — CLOPIDOGREL BISULFATE 75 MG/1
75 TABLET ORAL NIGHTLY
Qty: 90 TABLET | Refills: 1 | Status: SHIPPED | OUTPATIENT
Start: 2019-08-19 | End: 2020-02-06

## 2019-08-19 NOTE — TELEPHONE ENCOUNTER
Pt's daughter called and lvm for his prescription refill. Her name is Grisel and she can be reached at [phone]. Thank you.

## 2019-09-13 RX ORDER — HYDRALAZINE HYDROCHLORIDE 10 MG/1
TABLET, FILM COATED ORAL
Qty: 270 TABLET | Refills: 0 | Status: SHIPPED | OUTPATIENT
Start: 2019-09-13

## 2019-10-28 ENCOUNTER — TRANSCRIBE ORDERS (OUTPATIENT)
Dept: ADMINISTRATIVE | Facility: HOSPITAL | Age: 79
End: 2019-10-28

## 2019-10-28 DIAGNOSIS — I73.9 PERIPHERAL VASCULAR DISEASE, UNSPECIFIED (HCC): ICD-10-CM

## 2019-10-31 ENCOUNTER — APPOINTMENT (OUTPATIENT)
Dept: CARDIOLOGY | Facility: HOSPITAL | Age: 79
End: 2019-10-31

## 2019-10-31 ENCOUNTER — HOSPITAL ENCOUNTER (OUTPATIENT)
Dept: CARDIOLOGY | Facility: HOSPITAL | Age: 79
Discharge: HOME | End: 2019-10-31
Admitting: STUDENT IN AN ORGANIZED HEALTH CARE EDUCATION/TRAINING PROGRAM

## 2019-10-31 DIAGNOSIS — I73.9 PERIPHERAL VASCULAR DISEASE, UNSPECIFIED (HCC): ICD-10-CM

## 2019-10-31 LAB
BH CV LOWER ARTERIAL LEFT ABI RATIO: 0.62
BH CV LOWER ARTERIAL LEFT DORSALIS PEDIS SYS MAX: 112 MMHG
BH CV LOWER ARTERIAL LEFT GREAT TOE SYS MAX: 73 MMHG
BH CV LOWER ARTERIAL LEFT HIGH THIGH SYS MAX: 199 MMHG
BH CV LOWER ARTERIAL LEFT LOW THIGH SYS MAX: 194 MMHG
BH CV LOWER ARTERIAL LEFT POPLITEAL SYS MAX: 110 MMHG
BH CV LOWER ARTERIAL LEFT POST TIBIAL SYS MAX: 109 MMHG
BH CV LOWER ARTERIAL LEFT TBI RATIO: 0.4
BH CV LOWER ARTERIAL RIGHT ABI RATIO: 0.88
BH CV LOWER ARTERIAL RIGHT DORSALIS PEDIS SYS MAX: 160 MMHG
BH CV LOWER ARTERIAL RIGHT GREAT TOE SYS MAX: 101 MMHG
BH CV LOWER ARTERIAL RIGHT POST TIBIAL SYS MAX: 154 MMHG
BH CV LOWER ARTERIAL RIGHT TBI RATIO: 0.55
UPPER ARTERIAL LEFT ARM BRACHIAL SYS MAX: 182 MMHG
UPPER ARTERIAL RIGHT ARM BRACHIAL SYS MAX: 168 MMHG

## 2019-10-31 PROCEDURE — 93923 UPR/LXTR ART STDY 3+ LVLS: CPT

## 2019-11-12 ENCOUNTER — OFFICE (OUTPATIENT)
Dept: URBAN - METROPOLITAN AREA CLINIC 64 | Facility: CLINIC | Age: 79
End: 2019-11-12

## 2019-11-12 VITALS
SYSTOLIC BLOOD PRESSURE: 123 MMHG | WEIGHT: 222 LBS | DIASTOLIC BLOOD PRESSURE: 84 MMHG | HEART RATE: 73 BPM | HEIGHT: 69 IN

## 2019-11-12 DIAGNOSIS — R10.9 UNSPECIFIED ABDOMINAL PAIN: ICD-10-CM

## 2019-11-12 DIAGNOSIS — I10 ESSENTIAL (PRIMARY) HYPERTENSION: ICD-10-CM

## 2019-11-12 DIAGNOSIS — K59.00 CONSTIPATION, UNSPECIFIED: ICD-10-CM

## 2019-11-12 DIAGNOSIS — R11.2 NAUSEA WITH VOMITING, UNSPECIFIED: ICD-10-CM

## 2019-11-12 DIAGNOSIS — K22.70 BARRETT'S ESOPHAGUS WITHOUT DYSPLASIA: ICD-10-CM

## 2019-11-12 PROCEDURE — 99213 OFFICE O/P EST LOW 20 MIN: CPT | Performed by: NURSE PRACTITIONER

## 2020-02-06 RX ORDER — CLOPIDOGREL BISULFATE 75 MG/1
TABLET ORAL
Qty: 90 TABLET | Refills: 1 | Status: SHIPPED | OUTPATIENT
Start: 2020-02-06

## 2020-02-07 RX ORDER — METOPROLOL SUCCINATE 25 MG/1
TABLET, EXTENDED RELEASE ORAL
Qty: 540 TABLET | Refills: 1 | Status: SHIPPED | OUTPATIENT
Start: 2020-02-07 | End: 2020-08-06

## 2020-08-06 RX ORDER — METOPROLOL SUCCINATE 25 MG/1
TABLET, EXTENDED RELEASE ORAL
Qty: 540 TABLET | Refills: 0 | Status: SHIPPED | OUTPATIENT
Start: 2020-08-06 | End: 2020-11-03

## 2020-11-03 RX ORDER — METOPROLOL SUCCINATE 25 MG/1
TABLET, EXTENDED RELEASE ORAL
Qty: 540 TABLET | Refills: 0 | Status: SHIPPED | OUTPATIENT
Start: 2020-11-03 | End: 2021-01-26

## 2020-11-04 NOTE — TELEPHONE ENCOUNTER
"I called and spoke to the patient. He said he was \"ok for now\". I let him know he would eventually need an appt for for further refills, and he told me again that he didn't need an appt right now. Call documented.     Thanks  Laurence   "

## 2021-01-26 RX ORDER — METOPROLOL SUCCINATE 25 MG/1
TABLET, EXTENDED RELEASE ORAL
Qty: 540 TABLET | Refills: 1 | Status: SHIPPED | OUTPATIENT
Start: 2021-01-26 | End: 2021-12-06

## 2021-01-26 NOTE — TELEPHONE ENCOUNTER
Approve refill but has not been seen since April 2019.  I would be okay with at least a telephone visit if he has a blood pressure cuff to give us his vital signs.  Please call him to discuss and schedule with me within 1 to 2 months

## 2021-01-27 NOTE — TELEPHONE ENCOUNTER
"1/27- I spoke with patient. He said at this time he is refusing to come in or do a telehealth visit at he doesn't think he \"needs it\". I told him to call us back if he does decide to come in for follow up.     Thanks  Laurence   "

## 2021-12-06 RX ORDER — METOPROLOL SUCCINATE 25 MG/1
TABLET, EXTENDED RELEASE ORAL
Qty: 540 TABLET | Refills: 1 | Status: SHIPPED | OUTPATIENT
Start: 2021-12-06

## 2025-02-10 ENCOUNTER — OFFICE VISIT (OUTPATIENT)
Dept: CARDIOLOGY | Facility: CLINIC | Age: 85
End: 2025-02-10
Payer: MEDICARE

## 2025-02-10 ENCOUNTER — HOSPITAL ENCOUNTER (INPATIENT)
Facility: HOSPITAL | Age: 85
LOS: 2 days | Discharge: HOME | DRG: 305 | End: 2025-02-13
Attending: STUDENT IN AN ORGANIZED HEALTH CARE EDUCATION/TRAINING PROGRAM | Admitting: STUDENT IN AN ORGANIZED HEALTH CARE EDUCATION/TRAINING PROGRAM
Payer: MEDICARE

## 2025-02-10 ENCOUNTER — APPOINTMENT (OUTPATIENT)
Dept: GENERAL RADIOLOGY | Facility: HOSPITAL | Age: 85
DRG: 305 | End: 2025-02-10
Payer: MEDICARE

## 2025-02-10 VITALS
DIASTOLIC BLOOD PRESSURE: 84 MMHG | HEART RATE: 66 BPM | RESPIRATION RATE: 18 BRPM | HEIGHT: 69 IN | BODY MASS INDEX: 29.77 KG/M2 | WEIGHT: 201 LBS | OXYGEN SATURATION: 94 % | SYSTOLIC BLOOD PRESSURE: 198 MMHG

## 2025-02-10 DIAGNOSIS — I10 HYPERTENSION, UNSPECIFIED TYPE: Primary | ICD-10-CM

## 2025-02-10 DIAGNOSIS — I10 ESSENTIAL HYPERTENSION: Primary | ICD-10-CM

## 2025-02-10 PROBLEM — I16.0 HYPERTENSIVE URGENCY: Status: ACTIVE | Noted: 2025-02-10

## 2025-02-10 LAB
ALBUMIN SERPL-MCNC: 4.3 G/DL (ref 3.5–5.2)
ALBUMIN/GLOB SERPL: 1.5 G/DL
ALP SERPL-CCNC: 76 U/L (ref 39–117)
ALT SERPL W P-5'-P-CCNC: 11 U/L (ref 1–41)
ANION GAP SERPL CALCULATED.3IONS-SCNC: 12.1 MMOL/L (ref 5–15)
AST SERPL-CCNC: 16 U/L (ref 1–40)
BACTERIA UR QL AUTO: ABNORMAL /HPF
BASOPHILS # BLD AUTO: 0.05 10*3/MM3 (ref 0–0.2)
BASOPHILS NFR BLD AUTO: 0.7 % (ref 0–1.5)
BILIRUB SERPL-MCNC: <0.2 MG/DL (ref 0–1.2)
BILIRUB UR QL STRIP: NEGATIVE
BUN SERPL-MCNC: 35 MG/DL (ref 8–23)
BUN/CREAT SERPL: 15.7 (ref 7–25)
CALCIUM SPEC-SCNC: 9.7 MG/DL (ref 8.6–10.5)
CHLORIDE SERPL-SCNC: 100 MMOL/L (ref 98–107)
CLARITY UR: CLEAR
CO2 SERPL-SCNC: 23.9 MMOL/L (ref 22–29)
COLOR UR: YELLOW
CREAT SERPL-MCNC: 2.23 MG/DL (ref 0.76–1.27)
DEPRECATED RDW RBC AUTO: 43 FL (ref 37–54)
EGFRCR SERPLBLD CKD-EPI 2021: 28 ML/MIN/1.73
EOSINOPHIL # BLD AUTO: 0.15 10*3/MM3 (ref 0–0.4)
EOSINOPHIL NFR BLD AUTO: 2 % (ref 0.3–6.2)
ERYTHROCYTE [DISTWIDTH] IN BLOOD BY AUTOMATED COUNT: 13.1 % (ref 12.3–15.4)
GLOBULIN UR ELPH-MCNC: 2.8 GM/DL
GLUCOSE BLDC GLUCOMTR-MCNC: 130 MG/DL (ref 70–105)
GLUCOSE BLDC GLUCOMTR-MCNC: 324 MG/DL (ref 70–105)
GLUCOSE SERPL-MCNC: 133 MG/DL (ref 65–99)
GLUCOSE UR STRIP-MCNC: NEGATIVE MG/DL
HCT VFR BLD AUTO: 34.4 % (ref 37.5–51)
HGB BLD-MCNC: 11.3 G/DL (ref 13–17.7)
HGB UR QL STRIP.AUTO: ABNORMAL
HOLD SPECIMEN: NORMAL
HYALINE CASTS UR QL AUTO: ABNORMAL /LPF
IMM GRANULOCYTES # BLD AUTO: 0.02 10*3/MM3 (ref 0–0.05)
IMM GRANULOCYTES NFR BLD AUTO: 0.3 % (ref 0–0.5)
KETONES UR QL STRIP: NEGATIVE
LEUKOCYTE ESTERASE UR QL STRIP.AUTO: NEGATIVE
LYMPHOCYTES # BLD AUTO: 1.52 10*3/MM3 (ref 0.7–3.1)
LYMPHOCYTES NFR BLD AUTO: 20.2 % (ref 19.6–45.3)
MCH RBC QN AUTO: 29.5 PG (ref 26.6–33)
MCHC RBC AUTO-ENTMCNC: 32.8 G/DL (ref 31.5–35.7)
MCV RBC AUTO: 89.8 FL (ref 79–97)
MONOCYTES # BLD AUTO: 0.85 10*3/MM3 (ref 0.1–0.9)
MONOCYTES NFR BLD AUTO: 11.3 % (ref 5–12)
NEUTROPHILS NFR BLD AUTO: 4.94 10*3/MM3 (ref 1.7–7)
NEUTROPHILS NFR BLD AUTO: 65.5 % (ref 42.7–76)
NITRITE UR QL STRIP: NEGATIVE
NRBC BLD AUTO-RTO: 0 /100 WBC (ref 0–0.2)
PH UR STRIP.AUTO: 7 [PH] (ref 5–8)
PLATELET # BLD AUTO: 201 10*3/MM3 (ref 140–450)
PMV BLD AUTO: 10.5 FL (ref 6–12)
POTASSIUM SERPL-SCNC: 5.1 MMOL/L (ref 3.5–5.2)
PROT SERPL-MCNC: 7.1 G/DL (ref 6–8.5)
PROT UR QL STRIP: ABNORMAL
RBC # BLD AUTO: 3.83 10*6/MM3 (ref 4.14–5.8)
RBC # UR STRIP: ABNORMAL /HPF
REF LAB TEST METHOD: ABNORMAL
SODIUM SERPL-SCNC: 136 MMOL/L (ref 136–145)
SODIUM UR-SCNC: 74 MMOL/L
SP GR UR STRIP: 1.01 (ref 1–1.03)
SQUAMOUS #/AREA URNS HPF: ABNORMAL /HPF
UROBILINOGEN UR QL STRIP: ABNORMAL
WBC # UR STRIP: ABNORMAL /HPF
WBC NRBC COR # BLD AUTO: 7.53 10*3/MM3 (ref 3.4–10.8)
WHOLE BLOOD HOLD COAG: NORMAL

## 2025-02-10 PROCEDURE — 84300 ASSAY OF URINE SODIUM: CPT | Performed by: STUDENT IN AN ORGANIZED HEALTH CARE EDUCATION/TRAINING PROGRAM

## 2025-02-10 PROCEDURE — 82570 ASSAY OF URINE CREATININE: CPT | Performed by: STUDENT IN AN ORGANIZED HEALTH CARE EDUCATION/TRAINING PROGRAM

## 2025-02-10 PROCEDURE — 25010000002 ONDANSETRON PER 1 MG: Performed by: INTERNAL MEDICINE

## 2025-02-10 PROCEDURE — 71045 X-RAY EXAM CHEST 1 VIEW: CPT

## 2025-02-10 PROCEDURE — G0378 HOSPITAL OBSERVATION PER HR: HCPCS

## 2025-02-10 PROCEDURE — 80053 COMPREHEN METABOLIC PANEL: CPT | Performed by: NURSE PRACTITIONER

## 2025-02-10 PROCEDURE — 99204 OFFICE O/P NEW MOD 45 MIN: CPT | Performed by: INTERNAL MEDICINE

## 2025-02-10 PROCEDURE — 51798 US URINE CAPACITY MEASURE: CPT

## 2025-02-10 PROCEDURE — 99285 EMERGENCY DEPT VISIT HI MDM: CPT

## 2025-02-10 PROCEDURE — 25010000002 HEPARIN (PORCINE) PER 1000 UNITS: Performed by: STUDENT IN AN ORGANIZED HEALTH CARE EDUCATION/TRAINING PROGRAM

## 2025-02-10 PROCEDURE — 81001 URINALYSIS AUTO W/SCOPE: CPT | Performed by: NURSE PRACTITIONER

## 2025-02-10 PROCEDURE — 93005 ELECTROCARDIOGRAM TRACING: CPT | Performed by: NURSE PRACTITIONER

## 2025-02-10 PROCEDURE — 82948 REAGENT STRIP/BLOOD GLUCOSE: CPT

## 2025-02-10 PROCEDURE — 63710000001 INSULIN LISPRO (HUMAN) PER 5 UNITS: Performed by: STUDENT IN AN ORGANIZED HEALTH CARE EDUCATION/TRAINING PROGRAM

## 2025-02-10 PROCEDURE — 85025 COMPLETE CBC W/AUTO DIFF WBC: CPT | Performed by: NURSE PRACTITIONER

## 2025-02-10 RX ORDER — SUCRALFATE 1 G/1
1 TABLET ORAL DAILY
COMMUNITY
End: 2025-02-10

## 2025-02-10 RX ORDER — METOPROLOL SUCCINATE 100 MG/1
1 TABLET, EXTENDED RELEASE ORAL 3 TIMES DAILY
COMMUNITY
Start: 2025-01-27 | End: 2025-02-14

## 2025-02-10 RX ORDER — HYDRALAZINE HYDROCHLORIDE 10 MG/1
10 TABLET, FILM COATED ORAL 4 TIMES DAILY
Status: DISCONTINUED | OUTPATIENT
Start: 2025-02-10 | End: 2025-02-11

## 2025-02-10 RX ORDER — PANTOPRAZOLE SODIUM 40 MG/1
40 TABLET, DELAYED RELEASE ORAL DAILY
COMMUNITY

## 2025-02-10 RX ORDER — NICOTINE POLACRILEX 4 MG
15 LOZENGE BUCCAL
Status: DISCONTINUED | OUTPATIENT
Start: 2025-02-10 | End: 2025-02-13 | Stop reason: HOSPADM

## 2025-02-10 RX ORDER — METOPROLOL SUCCINATE 50 MG/1
100 TABLET, EXTENDED RELEASE ORAL EVERY 12 HOURS
Status: DISCONTINUED | OUTPATIENT
Start: 2025-02-10 | End: 2025-02-11

## 2025-02-10 RX ORDER — INSULIN LISPRO 100 [IU]/ML
2-7 INJECTION, SOLUTION INTRAVENOUS; SUBCUTANEOUS
Status: DISCONTINUED | OUTPATIENT
Start: 2025-02-10 | End: 2025-02-13 | Stop reason: HOSPADM

## 2025-02-10 RX ORDER — AMOXICILLIN 250 MG
2 CAPSULE ORAL 2 TIMES DAILY PRN
Status: DISCONTINUED | OUTPATIENT
Start: 2025-02-10 | End: 2025-02-11

## 2025-02-10 RX ORDER — SUCRALFATE 1 G/1
1 TABLET ORAL EVERY OTHER DAY
COMMUNITY

## 2025-02-10 RX ORDER — ACETAMINOPHEN 650 MG/1
650 SUPPOSITORY RECTAL EVERY 4 HOURS PRN
Status: DISCONTINUED | OUTPATIENT
Start: 2025-02-10 | End: 2025-02-13 | Stop reason: HOSPADM

## 2025-02-10 RX ORDER — HYDRALAZINE HYDROCHLORIDE 10 MG/1
10 TABLET, FILM COATED ORAL 2 TIMES DAILY
COMMUNITY
End: 2025-02-13 | Stop reason: HOSPADM

## 2025-02-10 RX ORDER — POLYETHYLENE GLYCOL 3350 17 G/17G
17 POWDER, FOR SOLUTION ORAL DAILY PRN
Status: DISCONTINUED | OUTPATIENT
Start: 2025-02-10 | End: 2025-02-11

## 2025-02-10 RX ORDER — AMLODIPINE BESYLATE 5 MG/1
1 TABLET ORAL 2 TIMES DAILY
COMMUNITY
Start: 2024-12-09

## 2025-02-10 RX ORDER — ONDANSETRON 2 MG/ML
4 INJECTION INTRAMUSCULAR; INTRAVENOUS EVERY 6 HOURS PRN
Status: DISCONTINUED | OUTPATIENT
Start: 2025-02-10 | End: 2025-02-13 | Stop reason: HOSPADM

## 2025-02-10 RX ORDER — OXYCODONE AND ACETAMINOPHEN 5; 325 MG/1; MG/1
TABLET ORAL SEE ADMIN INSTRUCTIONS
COMMUNITY
Start: 2025-01-10 | End: 2025-02-10

## 2025-02-10 RX ORDER — ACETAMINOPHEN 325 MG/1
650 TABLET ORAL EVERY 4 HOURS PRN
Status: DISCONTINUED | OUTPATIENT
Start: 2025-02-10 | End: 2025-02-13 | Stop reason: HOSPADM

## 2025-02-10 RX ORDER — NICOTINE 21 MG/24HR
1 PATCH, TRANSDERMAL 24 HOURS TRANSDERMAL DAILY PRN
Status: DISCONTINUED | OUTPATIENT
Start: 2025-02-10 | End: 2025-02-13 | Stop reason: HOSPADM

## 2025-02-10 RX ORDER — ATORVASTATIN CALCIUM 20 MG/1
20 TABLET, FILM COATED ORAL EVERY EVENING
COMMUNITY

## 2025-02-10 RX ORDER — CLONIDINE HYDROCHLORIDE 0.1 MG/1
0.1 TABLET ORAL ONCE
Status: COMPLETED | OUTPATIENT
Start: 2025-02-10 | End: 2025-02-10

## 2025-02-10 RX ORDER — CLOPIDOGREL BISULFATE 75 MG/1
75 TABLET ORAL DAILY
COMMUNITY
End: 2025-02-13 | Stop reason: HOSPADM

## 2025-02-10 RX ORDER — PANTOPRAZOLE SODIUM 40 MG/1
40 TABLET, DELAYED RELEASE ORAL
Status: DISCONTINUED | OUTPATIENT
Start: 2025-02-11 | End: 2025-02-13 | Stop reason: HOSPADM

## 2025-02-10 RX ORDER — HEPARIN SODIUM 5000 [USP'U]/ML
5000 INJECTION, SOLUTION INTRAVENOUS; SUBCUTANEOUS EVERY 8 HOURS SCHEDULED
Status: DISCONTINUED | OUTPATIENT
Start: 2025-02-10 | End: 2025-02-13 | Stop reason: HOSPADM

## 2025-02-10 RX ORDER — NITROGLYCERIN 0.4 MG/1
0.4 TABLET SUBLINGUAL
Status: DISCONTINUED | OUTPATIENT
Start: 2025-02-10 | End: 2025-02-13 | Stop reason: HOSPADM

## 2025-02-10 RX ORDER — CLOPIDOGREL BISULFATE 75 MG/1
75 TABLET ORAL DAILY
Status: DISCONTINUED | OUTPATIENT
Start: 2025-02-11 | End: 2025-02-10 | Stop reason: SDUPTHER

## 2025-02-10 RX ORDER — DEXTROSE MONOHYDRATE 25 G/50ML
25 INJECTION, SOLUTION INTRAVENOUS
Status: DISCONTINUED | OUTPATIENT
Start: 2025-02-10 | End: 2025-02-13 | Stop reason: HOSPADM

## 2025-02-10 RX ORDER — ASPIRIN 81 MG/1
81 TABLET ORAL DAILY
Status: DISCONTINUED | OUTPATIENT
Start: 2025-02-10 | End: 2025-02-12

## 2025-02-10 RX ORDER — CLOPIDOGREL BISULFATE 75 MG/1
75 TABLET ORAL DAILY
Status: DISCONTINUED | OUTPATIENT
Start: 2025-02-11 | End: 2025-02-12

## 2025-02-10 RX ORDER — IBUPROFEN 600 MG/1
1 TABLET ORAL
Status: DISCONTINUED | OUTPATIENT
Start: 2025-02-10 | End: 2025-02-13 | Stop reason: HOSPADM

## 2025-02-10 RX ORDER — ACETAMINOPHEN 160 MG/5ML
650 SOLUTION ORAL EVERY 4 HOURS PRN
Status: DISCONTINUED | OUTPATIENT
Start: 2025-02-10 | End: 2025-02-13 | Stop reason: HOSPADM

## 2025-02-10 RX ORDER — SODIUM CHLORIDE 0.9 % (FLUSH) 0.9 %
10 SYRINGE (ML) INJECTION AS NEEDED
Status: DISCONTINUED | OUTPATIENT
Start: 2025-02-10 | End: 2025-02-13 | Stop reason: HOSPADM

## 2025-02-10 RX ORDER — ATORVASTATIN CALCIUM 20 MG/1
20 TABLET, FILM COATED ORAL EVERY EVENING
Status: DISCONTINUED | OUTPATIENT
Start: 2025-02-10 | End: 2025-02-13 | Stop reason: HOSPADM

## 2025-02-10 RX ORDER — BISACODYL 5 MG/1
5 TABLET, DELAYED RELEASE ORAL DAILY PRN
Status: DISCONTINUED | OUTPATIENT
Start: 2025-02-10 | End: 2025-02-11

## 2025-02-10 RX ORDER — BISACODYL 10 MG
10 SUPPOSITORY, RECTAL RECTAL DAILY PRN
Status: DISCONTINUED | OUTPATIENT
Start: 2025-02-10 | End: 2025-02-11

## 2025-02-10 RX ORDER — ONDANSETRON 4 MG/1
4 TABLET, FILM COATED ORAL 2 TIMES DAILY
COMMUNITY
End: 2025-02-13 | Stop reason: HOSPADM

## 2025-02-10 RX ORDER — AMLODIPINE BESYLATE 5 MG/1
5 TABLET ORAL 2 TIMES DAILY
Status: DISCONTINUED | OUTPATIENT
Start: 2025-02-10 | End: 2025-02-13 | Stop reason: HOSPADM

## 2025-02-10 RX ADMIN — ONDANSETRON 4 MG: 2 INJECTION INTRAMUSCULAR; INTRAVENOUS at 21:43

## 2025-02-10 RX ADMIN — HYDRALAZINE HYDROCHLORIDE 10 MG: 10 TABLET ORAL at 17:38

## 2025-02-10 RX ADMIN — CLONIDINE HYDROCHLORIDE 0.1 MG: 0.1 TABLET ORAL at 16:00

## 2025-02-10 RX ADMIN — HEPARIN SODIUM 5000 UNITS: 5000 INJECTION INTRAVENOUS; SUBCUTANEOUS at 21:05

## 2025-02-10 RX ADMIN — ASPIRIN 81 MG: 81 TABLET, COATED ORAL at 17:38

## 2025-02-10 RX ADMIN — AMLODIPINE BESYLATE 5 MG: 5 TABLET ORAL at 21:06

## 2025-02-10 RX ADMIN — INSULIN LISPRO 5 UNITS: 100 INJECTION, SOLUTION INTRAVENOUS; SUBCUTANEOUS at 21:06

## 2025-02-10 RX ADMIN — METOPROLOL SUCCINATE 100 MG: 50 TABLET, EXTENDED RELEASE ORAL at 21:06

## 2025-02-10 RX ADMIN — ATORVASTATIN CALCIUM 20 MG: 20 TABLET, FILM COATED ORAL at 17:38

## 2025-02-10 RX ADMIN — ACETAMINOPHEN 650 MG: 325 TABLET, FILM COATED ORAL at 21:17

## 2025-02-10 RX ADMIN — HYDRALAZINE HYDROCHLORIDE 10 MG: 10 TABLET ORAL at 21:06

## 2025-02-10 RX ADMIN — NICOTINE 1 PATCH: 21 PATCH TRANSDERMAL at 21:05

## 2025-02-10 NOTE — ED PROVIDER NOTES
Subjective   Chief Complaint   Patient presents with    Hypertension     Pt reports high blood pressure at Dr. Milton office today.  Pt has no complaints.         History of Present Illness  Patient is an 84-year-old male with a history of hypertension, MI, coronary artery disease, CHF, diabetes presents the ED for evaluation of hypertension.  He was seen in the cardiologist office today, simply referred to the ED due to his elevated blood pressure.  Patient was going to have clearance for surgery, however Dr. Milton prefer patient be admitted to the hospitalist service, attempt to stabilize patient's blood pressure, and plan for stress test in a couple days for surgical clearance.    Patient denies any chest pain.  He is not having any headaches.  No dizziness, lightheadedness or syncope.  No abnormal leg pain or swelling.  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       Past Surgical History:   Procedure Laterality Date    CARDIAC CATHETERIZATION      CORONARY ARTERY BYPASS GRAFT      TRIPLE    DENTAL PROCEDURE      all teeth have been pulled    FEMORAL ARTERY STENT Bilateral     JOINT REPLACEMENT Right     knee    PROSTATE SURGERY         Family History   Problem Relation Age of Onset    COPD Mother     Heart disease Mother     Stroke  Mother     Hypertension Mother     Cancer Father        Social History     Socioeconomic History    Marital status:    Tobacco Use    Smoking status: Every Day     Current packs/day: 0.50     Types: Cigarettes    Smokeless tobacco: Never   Vaping Use    Vaping status: Never Used   Substance and Sexual Activity    Alcohol use: No    Drug use: No    Sexual activity: Defer           Objective   Physical Exam  Vitals and nursing note reviewed.   Constitutional:       Appearance: Normal appearance.   HENT:      Head: Normocephalic and atraumatic.      Nose: Nose normal.      Mouth/Throat:      Mouth: Mucous membranes are moist.      Pharynx: Oropharynx is clear.   Eyes:      Extraocular Movements: Extraocular movements intact.      Conjunctiva/sclera: Conjunctivae normal.      Pupils: Pupils are equal, round, and reactive to light.   Cardiovascular:      Rate and Rhythm: Normal rate and regular rhythm.      Heart sounds: Normal heart sounds. No murmur heard.     No friction rub. No gallop.   Pulmonary:      Effort: Pulmonary effort is normal.      Breath sounds: Normal breath sounds.   Abdominal:      General: Bowel sounds are normal.      Palpations: Abdomen is soft.   Musculoskeletal:      Right lower leg: No edema.      Left lower leg: No edema.   Skin:     General: Skin is warm and dry.      Capillary Refill: Capillary refill takes less than 2 seconds.   Neurological:      Mental Status: He is alert and oriented to person, place, and time.         Procedures           ED Course  ED Course as of 02/10/25 1757   Mon Feb 10, 2025   1740 BP: 156/64 [LB]      ED Course User Index  [LB] Starr Romero APRN                                                       Medical Decision Making  Problems Addressed:  Hypertension, unspecified type: complicated acute illness or injury    Amount and/or Complexity of Data Reviewed  Labs: ordered.  Radiology: ordered.  ECG/medicine tests: ordered.    Risk  Prescription  drug management.  Decision regarding hospitalization.    Due to significant overcrowding in the ED, the patient was evaluated by myself in a hallway bed. Exam may be limited by privacy, noise, and patient wearing a hospital gown. Explained to limitations to the patient and our overcrowding, they are in agreement to proceed with exam and treatment at this time.    Appropriate PPE worn during patient interactions.    Chart Review: 12/17/2024 presented for surgery clearance to PCP office.    EKG interpreted independently per ED attending physican-  Dr. Scott sinus bradycardia rate 58.  Compared to previous 4/8/2019.  Right bundle branch block.  , QTc 449    Imaging interpreted per ED attending physician-Dr. Scott.  No pneumonia or pneumothorax  final radiology read and report as documented.     Pt was Placed on appropriate monitoring.    Patient presents to the ED for the above complaint, underwent the above, exam and workup.  Patient is having no complaints of pain or discomfort.  No headaches.  Was sent from cardiology office due to elevated blood pressure, plan for admission for surgical clearance.  Patient was given dose of clonidine.  Blood pressure is decreasing.  He is also due for repeat blood pressure medicine that he has prescribed.  Lab work as above, discussed with hospitalist for admission.  Agrees to current treatment plan    I discussed with the patient their test results, work-up here in the emergency department, and need for admission and further evaluation. Patient is agreeable to the plan of care. At time of disposition patient's VS are reviewed, and patient without acute distress.  Opportunity was provided for questions at the bedside, all questions and concerns were addressed.      Note Disclaimer: At [facility], we believe that sharing information builds trust and better relationships. You are receiving this note because you recently visited [facility]. It is possible you will see  health information before a provider has talked with you about it. This kind of information can be easy to misunderstand. To help you fully understand what it means for your health, we urge you to discuss this note with your provider  Note dictated utilizing Dragon Dictation.          Final diagnoses:   Hypertension, unspecified type       ED Disposition  ED Disposition       ED Disposition   Decision to Admit    Condition   --    Comment   Level of Care: Stepdown [25]   Diagnosis: Hypertensive urgency [946596]   Admitting Physician: SANTA SENIOR [[id number]]   Attending Physician: SANTA SENIOR [[id number]]                 No follow-up provider specified.       Medication List        ASK your doctor about these medications      clopidogrel 75 MG tablet  Commonly known as: PLAVIX  Ask about: Which instructions should I use?     hydrALAZINE 10 MG tablet  Commonly known as: APRESOLINE  Ask about: Which instructions should I use?     pantoprazole 40 MG EC tablet  Commonly known as: PROTONIX  Ask about: Which instructions should I use?     sucralfate 1 g tablet  Commonly known as: CARAFATE  Ask about: Which instructions should I use?                 Starr Romero, TOREY  02/10/25 1087

## 2025-02-10 NOTE — ED NOTES
Nursing report ED to floor  Dave Peguero  84 y.o.  male    HPI:   Chief Complaint   Patient presents with    Hypertension     Pt reports high blood pressure at Dr. Milton office today.  Pt has no complaints.           Admitting doctor:   Fidel Arias MD    Admitting diagnosis:   The encounter diagnosis was Hypertension, unspecified type.    Code status:   Current Code Status       Date Active Code Status Order ID Comments User Context       2/10/2025 1718 No CPR (Do Not Attempt to Resuscitate) [id number]  Fidel Arias MD ED        Question Answer    Code Status (Patient has no pulse and is not breathing) No CPR (Do Not Attempt to Resuscitate)    Medical Interventions (Patient has pulse or is breathing) Full Support    Level Of Support Discussed With Patient                    Allergies:   Benadryl [diphenhydramine]    Isolation:  No active isolations     Fall Risk:  Fall Risk Assessment was completed, and patient is at moderate risk for falls.   Predictive Model Details         10 (Low) Factor Value    Calculated 2/10/2025 17:51 Age 84    Risk of Fall Model Active Peripheral IV Present     Imaging order in this encounter Present     Magnesium not on file     Diastolic BP 64     Number of Distinct Medication Classes administered 3     Creatinine 2.23 mg/dL     Tobacco Use Current     Chicho Scale not on file     Chloride 100 mmol/L     Clinically Relevant Sex Not Female     Number of administrations of Anti-Hypertensives 2     Respiratory Rate 14     Potassium 5.1 mmol/L     Total Bilirubin <0.2 mg/dL     Albumin 4.3 g/dL     Days after Admission 0.107     ALT 11 U/L     Calcium 9.7 mg/dL         Weight:       02/10/25  1513   Weight: 91.2 kg (201 lb)       Intake and Output  No intake or output data in the 24 hours ending 02/10/25 1752    Diet:   Dietary Orders (From admission, onward)       Start     Ordered    02/10/25 1649  Diet: Cardiac, Diabetic; Healthy Heart (2-3 Na+); Consistent Carbohydrate;  Fluid Consistency: Thin (IDDSI 0)  Diet Effective Now        References:    Diet Order Definitions   Question Answer Comment   Diets: Cardiac    Diets: Diabetic    Cardiac Diet: Healthy Heart (2-3 Na+)    Diabetic Diet: Consistent Carbohydrate    Fluid Consistency: Thin (IDDSI 0)        02/10/25 1649                     Most recent vitals:   Vitals:    02/10/25 1516 02/10/25 1529 02/10/25 1617 02/10/25 1707   BP: (!) 199/58 (!) 194/66 (!) 194/75 156/64   Pulse:  58 59 58   Resp:   14 14   Temp:       TempSrc:       SpO2:  95% 96% 95%   Weight:       Height:           Active LDAs/IV Access:   Lines, Drains & Airways       Active LDAs       Name Placement date Placement time Site Days    Peripheral IV 02/10/25 1542 Left Antecubital 02/10/25  1542  Antecubital  less than 1                    Skin Condition:   Skin Assessments (last day)       None             Labs (abnormal labs have a star):   Labs Reviewed   COMPREHENSIVE METABOLIC PANEL - Abnormal; Notable for the following components:       Result Value    Glucose 133 (*)     BUN 35 (*)     Creatinine 2.23 (*)     eGFR 28.0 (*)     All other components within normal limits    Narrative:     GFR Categories in Chronic Kidney Disease (CKD)      GFR Category          GFR (mL/min/1.73)    Interpretation  G1                     90 or greater         Normal or high (1)  G2                      60-89                Mild decrease (1)  G3a                   45-59                Mild to moderate decrease  G3b                   30-44                Moderate to severe decrease  G4                    15-29                Severe decrease  G5                    14 or less           Kidney failure          (1)In the absence of evidence of kidney disease, neither GFR category G1 or G2 fulfill the criteria for CKD.    eGFR calculation 2021 CKD-EPI creatinine equation, which does not include race as a factor   URINALYSIS W/ MICROSCOPIC IF INDICATED (NO CULTURE) - Abnormal; Notable for  the following components:    Blood, UA Small (1+) (*)     Protein, UA >=300 mg/dL (3+) (*)     All other components within normal limits   CBC WITH AUTO DIFFERENTIAL - Abnormal; Notable for the following components:    RBC 3.83 (*)     Hemoglobin 11.3 (*)     Hematocrit 34.4 (*)     All other components within normal limits   URINALYSIS, MICROSCOPIC ONLY - Abnormal; Notable for the following components:    RBC, UA 21-50 (*)     WBC, UA 6-10 (*)     All other components within normal limits   POCT GLUCOSE FINGERSTICK - Abnormal; Notable for the following components:    Glucose 130 (*)     All other components within normal limits   POCT GLUCOSE FINGERSTICK   POCT GLUCOSE FINGERSTICK   POCT GLUCOSE FINGERSTICK   POCT GLUCOSE FINGERSTICK   POCT GLUCOSE FINGERSTICK   CBC AND DIFFERENTIAL    Narrative:     The following orders were created for panel order CBC & Differential.  Procedure                               Abnormality         Status                     ---------                               -----------         ------                     CBC Auto Differential[[id number]]        Abnormal            Final result                 Please view results for these tests on the individual orders.   EXTRA TUBES    Narrative:     The following orders were created for panel order Extra Tubes.  Procedure                               Abnormality         Status                     ---------                               -----------         ------                     Gold Top - SST[[id number]]                                   Final result               Light Blue Top[[id number]]                                   Final result                 Please view results for these tests on the individual orders.   GOLD TOP - SST   LIGHT BLUE TOP       LOC: Person, Place, Time, and Situation    Telemetry:  Stepdown    Cardiac Monitoring Ordered: yes    EKG:   ECG 12 Lead Electrolyte Imbalance   Preliminary Result   HEART RATE=58  bpm   RR  Usylvrtv=1996  ms   WA Wzvdmmxf=361  ms   P Horizontal Axis=43  deg   P Front Axis=58  deg   QRSD Zndbbhui=796  ms   QT Kkuobsbr=902  ms   ADfU=101  ms   QRS Axis=17  deg   T Wave Axis=39  deg   - ABNORMAL ECG -   Sinus bradycardia   Right bundle branch block   Date and Time of Study:2025-02-10 16:04:33          Medications Given in the ED:   Medications   sodium chloride 0.9 % flush 10 mL (has no administration in time range)   heparin (porcine) 5000 UNIT/ML injection 5,000 Units (has no administration in time range)   nitroglycerin (NITROSTAT) SL tablet 0.4 mg (has no administration in time range)   Potassium Replacement - Follow Nurse / BPA Driven Protocol (has no administration in time range)   Magnesium Low Dose Replacement - Follow Nurse / BPA Driven Protocol (has no administration in time range)   Phosphorus Replacement - Follow Nurse / BPA Driven Protocol (has no administration in time range)   Calcium Replacement - Follow Nurse / BPA Driven Protocol (has no administration in time range)   acetaminophen (TYLENOL) tablet 650 mg (has no administration in time range)     Or   acetaminophen (TYLENOL) 160 MG/5ML oral solution 650 mg (has no administration in time range)     Or   acetaminophen (TYLENOL) suppository 650 mg (has no administration in time range)   sennosides-docusate (PERICOLACE) 8.6-50 MG per tablet 2 tablet (has no administration in time range)     And   polyethylene glycol (MIRALAX) packet 17 g (has no administration in time range)     And   bisacodyl (DULCOLAX) EC tablet 5 mg (has no administration in time range)     And   bisacodyl (DULCOLAX) suppository 10 mg (has no administration in time range)   amLODIPine (NORVASC) tablet 5 mg (has no administration in time range)   aspirin EC tablet 81 mg (81 mg Oral Given 2/10/25 1738)   atorvastatin (LIPITOR) tablet 20 mg (20 mg Oral Given 2/10/25 1738)   clopidogrel (PLAVIX) tablet 75 mg (has no administration in time range)   hydrALAZINE (APRESOLINE)  tablet 10 mg (10 mg Oral Given 2/10/25 1738)   metoprolol succinate XL (TOPROL-XL) 24 hr tablet 100 mg (has no administration in time range)   pantoprazole (PROTONIX) EC tablet 40 mg (has no administration in time range)   dextrose (GLUTOSE) oral gel 15 g (has no administration in time range)   dextrose (D50W) (25 g/50 mL) IV injection 25 g (has no administration in time range)   glucagon (GLUCAGEN) injection 1 mg (has no administration in time range)   insulin lispro (HUMALOG/ADMELOG) injection 2-7 Units ( Subcutaneous Not Given 2/10/25 1730)   nicotine (NICODERM CQ) 21 MG/24HR patch 1 patch (has no administration in time range)   cloNIDine (CATAPRES) tablet 0.1 mg (0.1 mg Oral Given 2/10/25 1600)       Imaging results:  XR Chest 1 View    Result Date: 2/10/2025  Impression: No acute chest finding. Electronically Signed: Jenn Olmedo MD  2/10/2025 4:56 PM EST  Workstation ID: [id number]     Social issues:   Social History     Socioeconomic History    Marital status:    Tobacco Use    Smoking status: Every Day     Current packs/day: 0.50     Types: Cigarettes    Smokeless tobacco: Never   Vaping Use    Vaping status: Never Used   Substance and Sexual Activity    Alcohol use: No    Drug use: No    Sexual activity: Defer       NIH Stroke Scale:  Interval: (not recorded)  1a. Level of Consciousness: (not recorded)  1b. LOC Questions: (not recorded)  1c. LOC Commands: (not recorded)  2. Best Gaze: (not recorded)  3. Visual: (not recorded)  4. Facial Palsy: (not recorded)  5a. Motor Arm, Left: (not recorded)  5b. Motor Arm, Right: (not recorded)  6a. Motor Leg, Left: (not recorded)  6b. Motor Leg, Right: (not recorded)  7. Limb Ataxia: (not recorded)  8. Sensory: (not recorded)  9. Best Language: (not recorded)  10. Dysarthria: (not recorded)  11. Extinction and Inattention (formerly Neglect): (not recorded)    Total (NIH Stroke Scale): (not recorded)     Additional notable assessment information:     Nursing  report ED to floor:  2D    Nathen Porter RN   02/10/25 17:52 EST

## 2025-02-10 NOTE — H&P
[facility] Medicine Services  History & Physical    Patient Name: Dave Peguero  : 1940  MRN: [id number]  Primary Care Physician:  Fidel Le MD  Date of admission: 2/10/2025  Date and Time of Service: 2/10/2025 at 17:15    Subjective      Chief Complaint: High blood pressure    History of Present Illness: Dave Peguero is a 84 y.o. male with a CMH of CAD, PVD, htn, suspected bladder malignancy, BPH, T2DM, tobacco use who presented to [facility] on 2/10/2025 with  elevated blood pressure. Pt has been following with [facility] for suspected bladder cancer as well as symptomatic BPH, reports intermittent blood clots in urine though none in the past couple days. He was seen with outpatient cardiology Dr. Milton today for pre-operative clearance for bladder mass/prostate resection. In the office noted to have significantly elevated blood pressures and sent in to ED. Pt is on multiple BP medications, denies missing doses. Does not take his pressures regularly at home. Denies any headache/vision changes/chest pain or pressure/nausea.       Review of Systems   Constitutional:  Negative for activity change, appetite change, chills, diaphoresis and fever.   HENT: Negative.     Eyes:  Negative for photophobia and visual disturbance.   Respiratory:  Negative for cough, chest tightness, shortness of breath and wheezing.    Cardiovascular:  Negative for chest pain, palpitations and leg swelling.   Gastrointestinal:  Negative for abdominal pain, constipation, diarrhea and nausea.   Genitourinary:         + intermittent hematuria/difficulty with urination   Musculoskeletal:  Positive for back pain. Negative for myalgias.   Neurological:  Negative for dizziness, syncope, weakness, light-headedness, numbness and headaches.       Personal History     Past Medical History:   Diagnosis Date    Arthritis     Atherosclerosis of autologous vein bypass graft of right lower extremity with  intermittent claudication     Atherosclerosis of native arteries of extremities with intermittent claudication, left leg     Cancer     breast cancer hx    Carotid artery stenosis     Cerebral vascular disease     CHF (congestive heart failure)     Coronary artery disease     Diabetes mellitus     type 1    Hyperlipidemia     Hypertension     Kidney stone     Myocardial infarction     x 3       Past Surgical History:   Procedure Laterality Date    CARDIAC CATHETERIZATION      CORONARY ARTERY BYPASS GRAFT      TRIPLE    DENTAL PROCEDURE      all teeth have been pulled    FEMORAL ARTERY STENT Bilateral     JOINT REPLACEMENT Right     knee    PROSTATE SURGERY         Family History: family history includes COPD in his mother; Cancer in his father; Heart disease in his mother; Hypertension in his mother; Stroke in his mother. Otherwise pertinent FHx was reviewed and not pertinent to current issue.    Social History:  reports that he has been smoking cigarettes. He has never used smokeless tobacco. He reports that he does not drink alcohol and does not use drugs.    Home Medications:  Prior to Admission Medications       Prescriptions Last Dose Informant Patient Reported? Taking?    amLODIPine (NORVASC) 5 MG tablet 2/10/2025  Yes Yes    Take 1 tablet by mouth 2 (Two) Times a Day.    aspirin 81 MG EC tablet 2/10/2025  No Yes    TAKE 1 TABLET BY MOUTH DAILY    atorvastatin (LIPITOR) 20 MG tablet 2/10/2025  Yes Yes    Take 1 tablet by mouth Every Evening.    calcium carbonate (OS-RUTH) 600 MG tablet 2/10/2025 Self Yes Yes    Take 1 tablet by mouth 2 (Two) Times a Day With Meals.    cetirizine (zyrTEC) 10 MG tablet 2/10/2025  No Yes    Take 1 tablet by mouth Daily.    clopidogrel (PLAVIX) 75 MG tablet 2/10/2025  Yes Yes    Take 1 tablet by mouth Daily.    hydrALAZINE (APRESOLINE) 10 MG tablet 2/10/2025  Yes Yes    Take 1 tablet by mouth 2 (Two) Times a Day.    metFORMIN (GLUCOPHAGE) 500 MG tablet 2/10/2025  Yes Yes    Take 1  tablet by mouth 3 (Three) Times a Day.    metoprolol succinate XL (TOPROL-XL) 100 MG 24 hr tablet 2/10/2025  Yes Yes    Take 1 tablet by mouth 3 times a day.    ondansetron (ZOFRAN) 4 MG tablet 2/10/2025  Yes Yes    Take 1 tablet by mouth 2 (Two) Times a Day.    pantoprazole (PROTONIX) 40 MG EC tablet   Yes Yes    Take 1 tablet by mouth Daily.    sucralfate (CARAFATE) 1 g tablet 2/10/2025  Yes Yes    Take 1 tablet by mouth Every Other Day.              Allergies:  Allergies   Allergen Reactions    Benadryl [Diphenhydramine] Unknown (See Comments)     Unknown allergy, but daughter is also allergic and states it causes throat swelling with her.       Objective      Vitals:   Temp:  [98 °F (36.7 °C)] 98 °F (36.7 °C)  Heart Rate:  [58-66] 58  Resp:  [14-20] 14  BP: (156-210)/(58-89) 156/64  Body mass index is 29.68 kg/m².  Physical Exam  Constitutional:       General: He is not in acute distress.     Appearance: Normal appearance.   HENT:      Head: Normocephalic and atraumatic.      Mouth/Throat:      Mouth: Mucous membranes are moist.      Pharynx: Oropharynx is clear.   Eyes:      Extraocular Movements: Extraocular movements intact.      Conjunctiva/sclera: Conjunctivae normal.      Pupils: Pupils are equal, round, and reactive to light.   Cardiovascular:      Rate and Rhythm: Normal rate and regular rhythm.      Heart sounds: Murmur heard.   Pulmonary:      Effort: Pulmonary effort is normal.      Breath sounds: Normal breath sounds. No wheezing, rhonchi or rales.   Abdominal:      General: Abdomen is flat. Bowel sounds are normal.      Palpations: Abdomen is soft.      Tenderness: There is no abdominal tenderness. There is no right CVA tenderness, left CVA tenderness, guarding or rebound.   Musculoskeletal:         General: No swelling or tenderness.      Cervical back: Normal range of motion and neck supple.      Right lower leg: No edema.      Left lower leg: No edema.   Neurological:      General: No focal  deficit present.      Mental Status: He is alert and oriented to person, place, and time. Mental status is at baseline.         Diagnostic Data:  Lab Results (last 24 hours)       Procedure Component Value Units Date/Time    Comprehensive Metabolic Panel [[id number]]  (Abnormal) Collected: 02/10/25 1545    Specimen: Blood from Arm, Left Updated: 02/10/25 1613     Glucose 133 mg/dL      BUN 35 mg/dL      Creatinine 2.23 mg/dL      Sodium 136 mmol/L      Potassium 5.1 mmol/L      Chloride 100 mmol/L      CO2 23.9 mmol/L      Calcium 9.7 mg/dL      Total Protein 7.1 g/dL      Albumin 4.3 g/dL      ALT (SGPT) 11 U/L      AST (SGOT) 16 U/L      Alkaline Phosphatase 76 U/L      Total Bilirubin <0.2 mg/dL      Globulin 2.8 gm/dL      A/G Ratio 1.5 g/dL      BUN/Creatinine Ratio 15.7     Anion Gap 12.1 mmol/L      eGFR 28.0 mL/min/1.73     Narrative:      GFR Categories in Chronic Kidney Disease (CKD)      GFR Category          GFR (mL/min/1.73)    Interpretation  G1                     90 or greater         Normal or high (1)  G2                      60-89                Mild decrease (1)  G3a                   45-59                Mild to moderate decrease  G3b                   30-44                Moderate to severe decrease  G4                    15-29                Severe decrease  G5                    14 or less           Kidney failure          (1)In the absence of evidence of kidney disease, neither GFR category G1 or G2 fulfill the criteria for CKD.    eGFR calculation 2021 CKD-EPI creatinine equation, which does not include race as a factor    Urinalysis With Microscopic If Indicated (No Culture) - Urine, Clean Catch [[id number]]  (Abnormal) Collected: 02/10/25 1600    Specimen: Urine, Clean Catch Updated: 02/10/25 1608     Color, UA Yellow     Appearance, UA Clear     pH, UA 7.0     Specific Gravity, UA 1.011     Glucose, UA Negative     Ketones, UA Negative     Bilirubin, UA Negative     Blood, UA Small (1+)      Protein, UA >=300 mg/dL (3+)     Leuk Esterase, UA Negative     Nitrite, UA Negative     Urobilinogen, UA 0.2 E.U./dL    Urinalysis, Microscopic Only - Urine, Clean Catch [[id number]]  (Abnormal) Collected: 02/10/25 1600    Specimen: Urine, Clean Catch Updated: 02/10/25 1608     RBC, UA 21-50 /HPF      WBC, UA 6-10 /HPF      Bacteria, UA None Seen /HPF      Squamous Epithelial Cells, UA 0-2 /HPF      Hyaline Casts, UA 3-6 /LPF      Methodology Automated Microscopy    Extra Tubes [[id number]] Collected: 02/10/25 1545    Specimen: Blood from Arm, Left Updated: 02/10/25 1600    Narrative:      The following orders were created for panel order Extra Tubes.  Procedure                               Abnormality         Status                     ---------                               -----------         ------                     Gold Top - SST[[id number]]                                   Final result               Light Blue Top[[id number]]                                   Final result                 Please view results for these tests on the individual orders.    Gold Top - SST [[id number]] Collected: 02/10/25 1545    Specimen: Blood from Arm, Left Updated: 02/10/25 1600     Extra Tube Hold for add-ons.     Comment: Auto resulted.       Light Blue Top [[id number]] Collected: 02/10/25 1545    Specimen: Blood from Arm, Left Updated: 02/10/25 1600     Extra Tube Hold for add-ons.     Comment: Auto resulted       CBC & Differential [[id number]]  (Abnormal) Collected: 02/10/25 1545    Specimen: Blood from Arm, Left Updated: 02/10/25 1555    Narrative:      The following orders were created for panel order CBC & Differential.  Procedure                               Abnormality         Status                     ---------                               -----------         ------                     CBC Auto Differential[[id number]]        Abnormal            Final result                 Please view results for these tests on the  individual orders.    CBC Auto Differential [[id number]]  (Abnormal) Collected: 02/10/25 1545    Specimen: Blood from Arm, Left Updated: 02/10/25 1555     WBC 7.53 10*3/mm3      RBC 3.83 10*6/mm3      Hemoglobin 11.3 g/dL      Hematocrit 34.4 %      MCV 89.8 fL      MCH 29.5 pg      MCHC 32.8 g/dL      RDW 13.1 %      RDW-SD 43.0 fl      MPV 10.5 fL      Platelets 201 10*3/mm3      Neutrophil % 65.5 %      Lymphocyte % 20.2 %      Monocyte % 11.3 %      Eosinophil % 2.0 %      Basophil % 0.7 %      Immature Grans % 0.3 %      Neutrophils, Absolute 4.94 10*3/mm3      Lymphocytes, Absolute 1.52 10*3/mm3      Monocytes, Absolute 0.85 10*3/mm3      Eosinophils, Absolute 0.15 10*3/mm3      Basophils, Absolute 0.05 10*3/mm3      Immature Grans, Absolute 0.02 10*3/mm3      nRBC 0.0 /100 WBC              Imaging Results (Last 24 Hours)       Procedure Component Value Units Date/Time    XR Chest 1 View [[id number]] Collected: 02/10/25 1656     Updated: 02/10/25 1658    Narrative:      XR CHEST 1 VW    Date of Exam: 2/10/2025 4:51 PM EST    Indication: hypertension    Comparison: AP chest 4/1/2019    Findings:  Heart size is mildly enlarged but stable with signs of median sternotomy CABG. Pulmonary vascular distribution is normal. Lungs are clear. No pleural effusion, pneumothorax or acute osseous abnormality.      Impression:      Impression:  No acute chest finding.      Electronically Signed: Jenn Olmedo MD    2/10/2025 4:56 PM EST    Workstation ID: [id number]              Assessment & Plan        This is a 84 y.o. male with:    Active and Resolved Problems  Active Hospital Problems    Diagnosis  POA    **Hypertensive urgency [I16.0]  Yes      Resolved Hospital Problems   No resolved problems to display.       Hypertension c/b hypertensive urgency  - Initial /89. Improved with PO/home medications in ED, has not required IV/drip.   - cont home hydralazine, metoprolol, amlodipine, titrate as needed    GWEN vs CKD  - Cr  2.23 OA, unsure recent baseline. Last 1.0 2019.   - c/f possible obstruction/bladder malignancy as well as uncontrolled htn  - UA w/ >300 prot, small blood, 21-50 RBC, 6-10 WBC.   - Nephrology consulted, appreciate assistance  - urine studies, renal US, bladder scan pending  - avoid nephrotoxic medications    Hematuria  Reported bladder malignancy  BPH  - following w/ [facility] Dr. Rodriguez, planning for mass resection/TURP after cardiac clearance  - Bladder scan pending as above, monitor for retention    CAD  PVD  Hld  - cardiology consulted for pre-op evaluation after BP control  - ECG sinus stephen, no ischemic changes appreciated   - cont home plavix, ASA, statin, metoprolol    T2DM  - on metformin at home, A1C 7.6 9/2024, repeat pending  - SSI while inpt  - CCD    Tobacco use  - counseled cessation  - nicotine patch ordered    VTE Prophylaxis:  Pharmacologic VTE prophylaxis orders are present.        The patient desires to be as follows:    CODE STATUS:    Level Of Support Discussed With: Patient  Code Status (Patient has no pulse and is not breathing): No CPR (Do Not Attempt to Resuscitate)  Medical Interventions (Patient has pulse or is breathing): Full Support        Rosey Peguero, who can be contacted at [phone] , is the designated person to make medical decisions on the patient's behalf if He is incapable of doing so. This was clarified with patient and/or next of kin on 2/10/2025 during the course of this H&P.    Admission Status:  I believe this patient meets observation status.    Expected Length of Stay: <2 midnights    PDMP and Medication Dispenses via Sidebar reviewed and consistent with patient reported medications.    I discussed the patient's findings and my recommendations with patient.      Signature:     This document has been electronically signed by Fidel Arias MD on February 10, 2025 17:19 [facility] Hospitalist Team

## 2025-02-10 NOTE — ED NOTES
Nursing report ED to floor  Dave Peguero  84 y.o.  male    HPI:   Chief Complaint   Patient presents with    Hypertension     Pt reports high blood pressure at Dr. Milton office today.  Pt has no complaints.           Admitting doctor:   Fidel Arias MD    Admitting diagnosis:   The encounter diagnosis was Hypertension, unspecified type.    Code status:   Current Code Status       Date Active Code Status Order ID Comments User Context       Not on file            Allergies:   Benadryl [diphenhydramine]    Isolation:  No active isolations     Fall Risk:  Fall Risk Assessment was completed, and patient is at low risk for falls.   Predictive Model Details         8 (Low) Factor Value    Calculated 2/10/2025 17:07 Age 84    Risk of Fall Model Active Peripheral IV Present     Imaging order in this encounter Present     Magnesium not on file     Diastolic BP 64     Creatinine 2.23 mg/dL     Tobacco Use Current     Chicho Scale not on file     Chloride 100 mmol/L     Clinically Relevant Sex Not Female     Number of Distinct Medication Classes administered 1     Respiratory Rate 14     Number of administrations of Anti-Hypertensives 1     Potassium 5.1 mmol/L     Total Bilirubin <0.2 mg/dL     Albumin 4.3 g/dL     ALT 11 U/L     Days after Admission 0.076     Calcium 9.7 mg/dL         Weight:       02/10/25  1513   Weight: 91.2 kg (201 lb)       Intake and Output  No intake or output data in the 24 hours ending 02/10/25 1709    Diet:   Dietary Orders (From admission, onward)       Start     Ordered    02/10/25 1649  Diet: Cardiac, Diabetic; Healthy Heart (2-3 Na+); Consistent Carbohydrate; Fluid Consistency: Thin (IDDSI 0)  Diet Effective Now        References:    Diet Order Definitions   Question Answer Comment   Diets: Cardiac    Diets: Diabetic    Cardiac Diet: Healthy Heart (2-3 Na+)    Diabetic Diet: Consistent Carbohydrate    Fluid Consistency: Thin (IDDSI 0)        02/10/25 1649                     Most recent  vitals:   Vitals:    02/10/25 1516 02/10/25 1529 02/10/25 1617 02/10/25 1707   BP: (!) 199/58 (!) 194/66 (!) 194/75 156/64   Pulse:  58 59 58   Resp:   14 14   Temp:       TempSrc:       SpO2:  95% 96% 95%   Weight:       Height:           Active LDAs/IV Access:   Lines, Drains & Airways       Active LDAs       Name Placement date Placement time Site Days    Peripheral IV 02/10/25 1542 Left Antecubital 02/10/25  1542  Antecubital  less than 1                    Skin Condition:   Skin Assessments (last day)       None             Labs (abnormal labs have a star):   Labs Reviewed   COMPREHENSIVE METABOLIC PANEL - Abnormal; Notable for the following components:       Result Value    Glucose 133 (*)     BUN 35 (*)     Creatinine 2.23 (*)     eGFR 28.0 (*)     All other components within normal limits    Narrative:     GFR Categories in Chronic Kidney Disease (CKD)      GFR Category          GFR (mL/min/1.73)    Interpretation  G1                     90 or greater         Normal or high (1)  G2                      60-89                Mild decrease (1)  G3a                   45-59                Mild to moderate decrease  G3b                   30-44                Moderate to severe decrease  G4                    15-29                Severe decrease  G5                    14 or less           Kidney failure          (1)In the absence of evidence of kidney disease, neither GFR category G1 or G2 fulfill the criteria for CKD.    eGFR calculation 2021 CKD-EPI creatinine equation, which does not include race as a factor   URINALYSIS W/ MICROSCOPIC IF INDICATED (NO CULTURE) - Abnormal; Notable for the following components:    Blood, UA Small (1+) (*)     Protein, UA >=300 mg/dL (3+) (*)     All other components within normal limits   CBC WITH AUTO DIFFERENTIAL - Abnormal; Notable for the following components:    RBC 3.83 (*)     Hemoglobin 11.3 (*)     Hematocrit 34.4 (*)     All other components within normal limits    URINALYSIS, MICROSCOPIC ONLY - Abnormal; Notable for the following components:    RBC, UA 21-50 (*)     WBC, UA 6-10 (*)     All other components within normal limits   POCT GLUCOSE FINGERSTICK   POCT GLUCOSE FINGERSTICK   POCT GLUCOSE FINGERSTICK   POCT GLUCOSE FINGERSTICK   POCT GLUCOSE FINGERSTICK   CBC AND DIFFERENTIAL    Narrative:     The following orders were created for panel order CBC & Differential.  Procedure                               Abnormality         Status                     ---------                               -----------         ------                     CBC Auto Differential[[id number]]        Abnormal            Final result                 Please view results for these tests on the individual orders.   EXTRA TUBES    Narrative:     The following orders were created for panel order Extra Tubes.  Procedure                               Abnormality         Status                     ---------                               -----------         ------                     Gold Top - [facility][[id number]]                                   Final result               Light Blue Top[[id number]]                                   Final result                 Please view results for these tests on the individual orders.   [facility]   LIGHT BLUE TOP       LOC: Person, Place, Time, and Situation    Telemetry:  Stepdown    Cardiac Monitoring Ordered: yes    EKG:   ECG 12 Lead Electrolyte Imbalance   Preliminary Result   HEART RATE=58  bpm   RR Cqpqbqbw=7553  ms   OH Sxfyswti=104  ms   P Horizontal Axis=43  deg   P Front Axis=58  deg   QRSD Jjlbgmsa=505  ms   QT Uqdwbmgi=498  ms   FXwN=874  ms   QRS Axis=17  deg   T Wave Axis=39  deg   - ABNORMAL ECG -   Sinus bradycardia   Right bundle branch block   Date and Time of Study:2025-02-10 16:04:33          Medications Given in the ED:   Medications   sodium chloride 0.9 % flush 10 mL (has no administration in time range)   heparin (porcine) 5000 UNIT/ML  injection 5,000 Units (has no administration in time range)   nitroglycerin (NITROSTAT) SL tablet 0.4 mg (has no administration in time range)   Potassium Replacement - Follow Nurse / BPA Driven Protocol (has no administration in time range)   Magnesium Low Dose Replacement - Follow Nurse / BPA Driven Protocol (has no administration in time range)   Phosphorus Replacement - Follow Nurse / BPA Driven Protocol (has no administration in time range)   Calcium Replacement - Follow Nurse / BPA Driven Protocol (has no administration in time range)   acetaminophen (TYLENOL) tablet 650 mg (has no administration in time range)     Or   acetaminophen (TYLENOL) 160 MG/5ML oral solution 650 mg (has no administration in time range)     Or   acetaminophen (TYLENOL) suppository 650 mg (has no administration in time range)   sennosides-docusate (PERICOLACE) 8.6-50 MG per tablet 2 tablet (has no administration in time range)     And   polyethylene glycol (MIRALAX) packet 17 g (has no administration in time range)     And   bisacodyl (DULCOLAX) EC tablet 5 mg (has no administration in time range)     And   bisacodyl (DULCOLAX) suppository 10 mg (has no administration in time range)   amLODIPine (NORVASC) tablet 5 mg (has no administration in time range)   aspirin EC tablet 81 mg (has no administration in time range)   atorvastatin (LIPITOR) tablet 20 mg (has no administration in time range)   clopidogrel (PLAVIX) tablet 75 mg (has no administration in time range)   hydrALAZINE (APRESOLINE) tablet 10 mg (has no administration in time range)   metoprolol succinate XL (TOPROL-XL) 24 hr tablet 100 mg (has no administration in time range)   pantoprazole (PROTONIX) EC tablet 40 mg (has no administration in time range)   dextrose (GLUTOSE) oral gel 15 g (has no administration in time range)   dextrose (D50W) (25 g/50 mL) IV injection 25 g (has no administration in time range)   glucagon (GLUCAGEN) injection 1 mg (has no administration in  time range)   insulin lispro (HUMALOG/ADMELOG) injection 2-7 Units (has no administration in time range)   cloNIDine (CATAPRES) tablet 0.1 mg (0.1 mg Oral Given 2/10/25 1600)       Imaging results:  XR Chest 1 View    Result Date: 2/10/2025  Impression: No acute chest finding. Electronically Signed: Jenn Olmedo MD  2/10/2025 4:56 PM EST  Workstation ID: [id number]     Social issues:   Social History     Socioeconomic History    Marital status:    Tobacco Use    Smoking status: Every Day     Current packs/day: 0.50     Types: Cigarettes    Smokeless tobacco: Never   Vaping Use    Vaping status: Never Used   Substance and Sexual Activity    Alcohol use: No    Drug use: No    Sexual activity: Defer       NIH Stroke Scale:  Interval: (not recorded)  1a. Level of Consciousness: (not recorded)  1b. LOC Questions: (not recorded)  1c. LOC Commands: (not recorded)  2. Best Gaze: (not recorded)  3. Visual: (not recorded)  4. Facial Palsy: (not recorded)  5a. Motor Arm, Left: (not recorded)  5b. Motor Arm, Right: (not recorded)  6a. Motor Leg, Left: (not recorded)  6b. Motor Leg, Right: (not recorded)  7. Limb Ataxia: (not recorded)  8. Sensory: (not recorded)  9. Best Language: (not recorded)  10. Dysarthria: (not recorded)  11. Extinction and Inattention (formerly Neglect): (not recorded)    Total (NIH Stroke Scale): (not recorded)     Additional notable assessment information:     Nursing report ED to floor:  Monster Porter RN   02/10/25 17:09 EST

## 2025-02-10 NOTE — Clinical Note
Level of Care: Telemetry [5]   Admitting Physician: SANTA SENIOR [[id number]]   Attending Physician: SANTA SENIOR [[id number]]

## 2025-02-11 ENCOUNTER — APPOINTMENT (OUTPATIENT)
Dept: ULTRASOUND IMAGING | Facility: HOSPITAL | Age: 85
DRG: 305 | End: 2025-02-11
Payer: MEDICARE

## 2025-02-11 LAB
ANION GAP SERPL CALCULATED.3IONS-SCNC: 10.7 MMOL/L (ref 5–15)
BASOPHILS # BLD AUTO: 0.04 10*3/MM3 (ref 0–0.2)
BASOPHILS NFR BLD AUTO: 0.6 % (ref 0–1.5)
BUN SERPL-MCNC: 37 MG/DL (ref 8–23)
BUN/CREAT SERPL: 16.2 (ref 7–25)
CALCIUM SPEC-SCNC: 9.1 MG/DL (ref 8.6–10.5)
CHLORIDE SERPL-SCNC: 102 MMOL/L (ref 98–107)
CO2 SERPL-SCNC: 24.3 MMOL/L (ref 22–29)
CREAT SERPL-MCNC: 2.28 MG/DL (ref 0.76–1.27)
CREAT UR-MCNC: 39.5 MG/DL
DEPRECATED RDW RBC AUTO: 43.1 FL (ref 37–54)
EGFRCR SERPLBLD CKD-EPI 2021: 27.3 ML/MIN/1.73
EOSINOPHIL # BLD AUTO: 0.2 10*3/MM3 (ref 0–0.4)
EOSINOPHIL NFR BLD AUTO: 2.9 % (ref 0.3–6.2)
ERYTHROCYTE [DISTWIDTH] IN BLOOD BY AUTOMATED COUNT: 13.1 % (ref 12.3–15.4)
GLUCOSE BLDC GLUCOMTR-MCNC: 136 MG/DL (ref 70–105)
GLUCOSE BLDC GLUCOMTR-MCNC: 185 MG/DL (ref 70–105)
GLUCOSE BLDC GLUCOMTR-MCNC: 186 MG/DL (ref 70–105)
GLUCOSE BLDC GLUCOMTR-MCNC: 200 MG/DL (ref 70–105)
GLUCOSE BLDC GLUCOMTR-MCNC: 261 MG/DL (ref 70–105)
GLUCOSE SERPL-MCNC: 135 MG/DL (ref 65–99)
HCT VFR BLD AUTO: 30.6 % (ref 37.5–51)
HGB BLD-MCNC: 9.9 G/DL (ref 13–17.7)
IMM GRANULOCYTES # BLD AUTO: 0.03 10*3/MM3 (ref 0–0.05)
IMM GRANULOCYTES NFR BLD AUTO: 0.4 % (ref 0–0.5)
LYMPHOCYTES # BLD AUTO: 1.4 10*3/MM3 (ref 0.7–3.1)
LYMPHOCYTES NFR BLD AUTO: 20.3 % (ref 19.6–45.3)
MAGNESIUM SERPL-MCNC: 2.2 MG/DL (ref 1.6–2.4)
MCH RBC QN AUTO: 28.9 PG (ref 26.6–33)
MCHC RBC AUTO-ENTMCNC: 32.4 G/DL (ref 31.5–35.7)
MCV RBC AUTO: 89.5 FL (ref 79–97)
MONOCYTES # BLD AUTO: 0.83 10*3/MM3 (ref 0.1–0.9)
MONOCYTES NFR BLD AUTO: 12 % (ref 5–12)
NEUTROPHILS NFR BLD AUTO: 4.41 10*3/MM3 (ref 1.7–7)
NEUTROPHILS NFR BLD AUTO: 63.8 % (ref 42.7–76)
NRBC BLD AUTO-RTO: 0 /100 WBC (ref 0–0.2)
PHOSPHATE SERPL-MCNC: 4.8 MG/DL (ref 2.5–4.5)
PLATELET # BLD AUTO: 200 10*3/MM3 (ref 140–450)
PMV BLD AUTO: 10.8 FL (ref 6–12)
POTASSIUM SERPL-SCNC: 5.2 MMOL/L (ref 3.5–5.2)
QT INTERVAL: 459 MS
QTC INTERVAL: 449 MS
RBC # BLD AUTO: 3.42 10*6/MM3 (ref 4.14–5.8)
SODIUM SERPL-SCNC: 137 MMOL/L (ref 136–145)
WBC NRBC COR # BLD AUTO: 6.91 10*3/MM3 (ref 3.4–10.8)

## 2025-02-11 PROCEDURE — 82948 REAGENT STRIP/BLOOD GLUCOSE: CPT | Performed by: STUDENT IN AN ORGANIZED HEALTH CARE EDUCATION/TRAINING PROGRAM

## 2025-02-11 PROCEDURE — 82948 REAGENT STRIP/BLOOD GLUCOSE: CPT

## 2025-02-11 PROCEDURE — 76775 US EXAM ABDO BACK WALL LIM: CPT

## 2025-02-11 PROCEDURE — 63710000001 INSULIN LISPRO (HUMAN) PER 5 UNITS: Performed by: STUDENT IN AN ORGANIZED HEALTH CARE EDUCATION/TRAINING PROGRAM

## 2025-02-11 PROCEDURE — 25010000002 HYDRALAZINE PER 20 MG: Performed by: INTERNAL MEDICINE

## 2025-02-11 PROCEDURE — 83516 IMMUNOASSAY NONANTIBODY: CPT | Performed by: INTERNAL MEDICINE

## 2025-02-11 PROCEDURE — 82570 ASSAY OF URINE CREATININE: CPT | Performed by: INTERNAL MEDICINE

## 2025-02-11 PROCEDURE — 86037 ANCA TITER EACH ANTIBODY: CPT | Performed by: INTERNAL MEDICINE

## 2025-02-11 PROCEDURE — 85025 COMPLETE CBC W/AUTO DIFF WBC: CPT | Performed by: STUDENT IN AN ORGANIZED HEALTH CARE EDUCATION/TRAINING PROGRAM

## 2025-02-11 PROCEDURE — 84100 ASSAY OF PHOSPHORUS: CPT | Performed by: STUDENT IN AN ORGANIZED HEALTH CARE EDUCATION/TRAINING PROGRAM

## 2025-02-11 PROCEDURE — 25810000003 SODIUM CHLORIDE 0.9 % SOLUTION: Performed by: INTERNAL MEDICINE

## 2025-02-11 PROCEDURE — 97161 PT EVAL LOW COMPLEX 20 MIN: CPT

## 2025-02-11 PROCEDURE — 84156 ASSAY OF PROTEIN URINE: CPT | Performed by: INTERNAL MEDICINE

## 2025-02-11 PROCEDURE — 25010000002 HEPARIN (PORCINE) PER 1000 UNITS: Performed by: STUDENT IN AN ORGANIZED HEALTH CARE EDUCATION/TRAINING PROGRAM

## 2025-02-11 PROCEDURE — 83735 ASSAY OF MAGNESIUM: CPT | Performed by: STUDENT IN AN ORGANIZED HEALTH CARE EDUCATION/TRAINING PROGRAM

## 2025-02-11 PROCEDURE — 99222 1ST HOSP IP/OBS MODERATE 55: CPT | Performed by: INTERNAL MEDICINE

## 2025-02-11 PROCEDURE — 80048 BASIC METABOLIC PNL TOTAL CA: CPT | Performed by: STUDENT IN AN ORGANIZED HEALTH CARE EDUCATION/TRAINING PROGRAM

## 2025-02-11 RX ORDER — HYDRALAZINE HYDROCHLORIDE 25 MG/1
25 TABLET, FILM COATED ORAL 4 TIMES DAILY
Status: DISCONTINUED | OUTPATIENT
Start: 2025-02-11 | End: 2025-02-12

## 2025-02-11 RX ORDER — BISACODYL 5 MG/1
5 TABLET, DELAYED RELEASE ORAL DAILY PRN
Status: DISCONTINUED | OUTPATIENT
Start: 2025-02-11 | End: 2025-02-13 | Stop reason: HOSPADM

## 2025-02-11 RX ORDER — HYDRALAZINE HYDROCHLORIDE 20 MG/ML
10 INJECTION INTRAMUSCULAR; INTRAVENOUS EVERY 4 HOURS PRN
Status: DISCONTINUED | OUTPATIENT
Start: 2025-02-11 | End: 2025-02-13 | Stop reason: HOSPADM

## 2025-02-11 RX ORDER — BISACODYL 10 MG
10 SUPPOSITORY, RECTAL RECTAL DAILY PRN
Status: DISCONTINUED | OUTPATIENT
Start: 2025-02-11 | End: 2025-02-13 | Stop reason: HOSPADM

## 2025-02-11 RX ORDER — LACTULOSE 10 G/15ML
20 SOLUTION ORAL ONCE
Status: COMPLETED | OUTPATIENT
Start: 2025-02-11 | End: 2025-02-11

## 2025-02-11 RX ORDER — AMOXICILLIN 250 MG
2 CAPSULE ORAL 2 TIMES DAILY PRN
Status: DISCONTINUED | OUTPATIENT
Start: 2025-02-11 | End: 2025-02-13 | Stop reason: HOSPADM

## 2025-02-11 RX ORDER — POLYETHYLENE GLYCOL 3350 17 G/17G
17 POWDER, FOR SOLUTION ORAL DAILY
Status: DISCONTINUED | OUTPATIENT
Start: 2025-02-11 | End: 2025-02-13 | Stop reason: HOSPADM

## 2025-02-11 RX ORDER — CARVEDILOL 25 MG/1
25 TABLET ORAL 2 TIMES DAILY WITH MEALS
Status: DISCONTINUED | OUTPATIENT
Start: 2025-02-11 | End: 2025-02-13 | Stop reason: HOSPADM

## 2025-02-11 RX ORDER — SODIUM CHLORIDE 9 MG/ML
100 INJECTION, SOLUTION INTRAVENOUS CONTINUOUS
Status: DISPENSED | OUTPATIENT
Start: 2025-02-11 | End: 2025-02-12

## 2025-02-11 RX ORDER — AMOXICILLIN 250 MG
1 CAPSULE ORAL 2 TIMES DAILY
Status: DISCONTINUED | OUTPATIENT
Start: 2025-02-11 | End: 2025-02-13 | Stop reason: HOSPADM

## 2025-02-11 RX ADMIN — LACTULOSE 20 G: 20 SOLUTION ORAL at 14:49

## 2025-02-11 RX ADMIN — HYDRALAZINE HYDROCHLORIDE 25 MG: 25 TABLET ORAL at 17:01

## 2025-02-11 RX ADMIN — AMLODIPINE BESYLATE 5 MG: 5 TABLET ORAL at 08:40

## 2025-02-11 RX ADMIN — NICOTINE 1 PATCH: 21 PATCH TRANSDERMAL at 21:36

## 2025-02-11 RX ADMIN — INSULIN LISPRO 4 UNITS: 100 INJECTION, SOLUTION INTRAVENOUS; SUBCUTANEOUS at 21:24

## 2025-02-11 RX ADMIN — AMLODIPINE BESYLATE 5 MG: 5 TABLET ORAL at 21:24

## 2025-02-11 RX ADMIN — PANTOPRAZOLE SODIUM 40 MG: 40 TABLET, DELAYED RELEASE ORAL at 05:59

## 2025-02-11 RX ADMIN — SODIUM CHLORIDE 100 ML/HR: 9 INJECTION, SOLUTION INTRAVENOUS at 10:39

## 2025-02-11 RX ADMIN — SENNOSIDES AND DOCUSATE SODIUM 1 TABLET: 50; 8.6 TABLET ORAL at 21:24

## 2025-02-11 RX ADMIN — INSULIN LISPRO 2 UNITS: 100 INJECTION, SOLUTION INTRAVENOUS; SUBCUTANEOUS at 11:24

## 2025-02-11 RX ADMIN — SENNOSIDES AND DOCUSATE SODIUM 2 TABLET: 50; 8.6 TABLET ORAL at 06:04

## 2025-02-11 RX ADMIN — HEPARIN SODIUM 5000 UNITS: 5000 INJECTION INTRAVENOUS; SUBCUTANEOUS at 21:24

## 2025-02-11 RX ADMIN — Medication 10 ML: at 23:58

## 2025-02-11 RX ADMIN — HYDRALAZINE HYDROCHLORIDE 10 MG: 10 TABLET ORAL at 08:39

## 2025-02-11 RX ADMIN — INSULIN LISPRO 2 UNITS: 100 INJECTION, SOLUTION INTRAVENOUS; SUBCUTANEOUS at 17:01

## 2025-02-11 RX ADMIN — HYDRALAZINE HYDROCHLORIDE 25 MG: 25 TABLET ORAL at 19:37

## 2025-02-11 RX ADMIN — HYDRALAZINE HYDROCHLORIDE 25 MG: 25 TABLET ORAL at 10:39

## 2025-02-11 RX ADMIN — METOPROLOL SUCCINATE 100 MG: 50 TABLET, EXTENDED RELEASE ORAL at 08:38

## 2025-02-11 RX ADMIN — POLYETHYLENE GLYCOL 3350 17 G: 17 POWDER, FOR SOLUTION ORAL at 06:04

## 2025-02-11 RX ADMIN — HYDRALAZINE HYDROCHLORIDE 10 MG: 20 INJECTION INTRAMUSCULAR; INTRAVENOUS at 23:57

## 2025-02-11 RX ADMIN — Medication 10 ML: at 09:39

## 2025-02-11 RX ADMIN — SODIUM CHLORIDE 100 ML/HR: 9 INJECTION, SOLUTION INTRAVENOUS at 21:35

## 2025-02-11 RX ADMIN — HEPARIN SODIUM 5000 UNITS: 5000 INJECTION INTRAVENOUS; SUBCUTANEOUS at 05:59

## 2025-02-11 RX ADMIN — CARVEDILOL 25 MG: 25 TABLET, FILM COATED ORAL at 17:01

## 2025-02-11 RX ADMIN — ATORVASTATIN CALCIUM 20 MG: 20 TABLET, FILM COATED ORAL at 17:01

## 2025-02-11 NOTE — PLAN OF CARE
"  Problem: Adult Inpatient Plan of Care  Goal: Absence of Hospital-Acquired Illness or Injury  Intervention: Identify and Manage Fall Risk  Recent Flowsheet Documentation  Taken 2/11/2025 0235 by Sophia Davis RN  Safety Promotion/Fall Prevention:   assistive device/personal items within reach   clutter free environment maintained   fall prevention program maintained   lighting adjusted   nonskid shoes/slippers when out of bed   room organization consistent   safety round/check completed  Intervention: Prevent Skin Injury  Recent Flowsheet Documentation  Taken 2/11/2025 0235 by Sophia Davis RN  Body Position: position changed independently  Intervention: Prevent Infection  Recent Flowsheet Documentation  Taken 2/11/2025 0235 by Sophia Davis RN  Infection Prevention:   hand hygiene promoted   personal protective equipment utilized     Problem: Hypertension Acute  Goal: Blood Pressure Within Desired Range  Intervention: Normalize Blood Pressure  Recent Flowsheet Documentation  Taken 2/11/2025 0235 by Sophia Davis RN  Medication Review/Management: medications reviewed   Goal Outcome Evaluation:      Pt concerned with \"the monetary effects\" this hospital stay will have on his wife. Pt concerned that he only has medicare and he \"knows that he does not have much time left and will go to [facility]\", so he does not want to incur extra charges for tests, lab work, ect. Pt asked this RN of the cost of such things, such  as renal US. This RN unable to give those answers. Patient wanting to speak with , and physician regarding costs and further explanation of necessity on tests being ordered.    Pt resting in bed at this time. VSS, no complaints noted. Call light in reach. Plan of care ongoing.                                       "

## 2025-02-11 NOTE — PROGRESS NOTES
[facility] MEDICINE SERVICE  DAILY PROGRESS NOTE    NAME: Dave Peguero  : 1940  MRN: [id number]      LOS: 0 days     PROVIDER OF SERVICE: Timothy Duane Brammell, MD    Chief Complaint: Hypertensive urgency    Subjective:     Interval History:  History taken from: patient    Patient states that he feels much better than yesterday.  Denies any headache or paresthesias.  Denies any shortness of breath.  Still urinating some blood.  Has some ongoing constipation issues.  Wanting stress test done due to issues with her prior study where he apparently became hypotensive.  Denies any other additional acute issues.        Review of Systems:   Review of Systems   All other systems reviewed and are negative.      Objective:     Vital Signs  Temp:  [97.3 °F (36.3 °C)-98 °F (36.7 °C)] 98 °F (36.7 °C)  Heart Rate:  [58-66] 61  Resp:  [12-22] 22  BP: (144-210)/(42-89) 161/56   Body mass index is 29.07 kg/m².    Physical Exam  Physical Exam  Vitals reviewed.   HENT:      Head: Normocephalic and atraumatic.   Cardiovascular:      Rate and Rhythm: Normal rate and regular rhythm.      Pulses: Normal pulses.      Heart sounds: Normal heart sounds.   Pulmonary:      Effort: Pulmonary effort is normal.   Abdominal:      General: Bowel sounds are normal.      Palpations: Abdomen is soft.      Tenderness: There is no abdominal tenderness.   Musculoskeletal:         General: No swelling.   Neurological:      Mental Status: He is alert.            Diagnostic Data    Results from last 7 days   Lab Units 25  0615 02/10/25  1545   WBC 10*3/mm3 6.91 7.53   HEMOGLOBIN g/dL 9.9* 11.3*   HEMATOCRIT % 30.6* 34.4*   PLATELETS 10*3/mm3 200 201   GLUCOSE mg/dL 135* 133*   CREATININE mg/dL 2.28* 2.23*   BUN mg/dL 37* 35*   SODIUM mmol/L 137 136   POTASSIUM mmol/L 5.2 5.1   AST (SGOT) U/L  --  16   ALT (SGPT) U/L  --  11   ALK PHOS U/L  --  76   BILIRUBIN mg/dL  --  <0.2   ANION GAP mmol/L 10.7 12.1       US Renal  Bilateral    Result Date: 2/11/2025  Impression: 1. No hydronephrosis. 2. Symmetric renal atrophy and diffuse renal cortical thinning. 3. Incidental large intraluminal bladder mass suspicious for neoplasm. Recommend urology consultation for management. Findings communicated with ordering provider Dr. Romero via [place] secure chat, message acknowledged 10:35 a.m. 2/11/2025. Electronically Signed: Dave Buckley MD  2/11/2025 10:35 AM EST  Workstation ID: [id number]    XR Chest 1 View    Result Date: 2/10/2025  Impression: No acute chest finding. Electronically Signed: Jenn Olmedo MD  2/10/2025 4:56 PM EST  Workstation ID: [id number]           Assessment:    Hypertension  Probable bladder carcinoma bladder mass.  Hematuria  Coronary artery disease/CABG  Peripheral arterial disease  Acute kidney injury superimposed on chronic kidney disease  Type 2 diabetes       Plan.  Cardiology with planned echocardiogram and needed stress test.  Nephrology following.  Follow-up lab work.      Active and Resolved Problems  Active Hospital Problems    Diagnosis  POA    **Hypertensive urgency [I16.0]  Yes      Resolved Hospital Problems   No resolved problems to display.           VTE Prophylaxis:  Pharmacologic VTE prophylaxis orders are present.             Disposition Planning:     Barriers to Discharge:renal function  Anticipated Date of Discharge: 2/15  Place of Discharge: home      Time: 30 minutes     Code Status and Medical Interventions: No CPR (Do Not Attempt to Resuscitate); Full Support   Ordered at: 02/10/25 3537     Level Of Support Discussed With:    Patient     Code Status (Patient has no pulse and is not breathing):    No CPR (Do Not Attempt to Resuscitate)     Medical Interventions (Patient has pulse or is breathing):    Full Support       Signature: Electronically signed by Timothy Duane Brammell, MD, 02/11/25, 11:29 EST.  [facility] Hospitalist Team

## 2025-02-11 NOTE — CONSULTS
[facility] CONSULT      Patient Identification:  NAME:  Dave Peguero  Age:  84 y.o.   Sex:  male   :  1940   MRN:  [id number]     Chief complaint: Bladder tumor    History of present illness:      This patient is well-known to [facility] urology.  Bladder tumor found on cystoscopy in 2024.  Needs cardiac clearance and antithrombotics held.  Lost to follow-up and here in [facility] with stress test in the morning.    In hospital:  Asked to see    Past medical history:  Past Medical History:   Diagnosis Date    Arthritis     Atherosclerosis of autologous vein bypass graft of right lower extremity with intermittent claudication     Atherosclerosis of native arteries of extremities with intermittent claudication, left leg     Cancer     breast cancer hx    Carotid artery stenosis     Cerebral vascular disease     CHF (congestive heart failure)     Coronary artery disease     Diabetes mellitus     type 1    Hyperlipidemia     Hypertension     Kidney stone     Myocardial infarction     x 3       Past surgical history:  Past Surgical History:   Procedure Laterality Date    CARDIAC CATHETERIZATION      CORONARY ARTERY BYPASS GRAFT      TRIPLE    DENTAL PROCEDURE      all teeth have been pulled    FEMORAL ARTERY STENT Bilateral     JOINT REPLACEMENT Right     knee    PROSTATE SURGERY         Allergies:  Benadryl [diphenhydramine]    Home medications:  Medications Prior to Admission   Medication Sig Dispense Refill Last Dose/Taking    amLODIPine (NORVASC) 5 MG tablet Take 1 tablet by mouth 2 (Two) Times a Day.   2/10/2025    aspirin 81 MG EC tablet TAKE 1 TABLET BY MOUTH DAILY 90 tablet 0 2/10/2025    atorvastatin (LIPITOR) 20 MG tablet Take 1 tablet by mouth Every Evening.   2/10/2025    calcium carbonate (OS-RUTH) 600 MG tablet Take 1 tablet by mouth 2 (Two) Times a Day With Meals.   2/10/2025    cetirizine (zyrTEC) 10 MG tablet Take 1 tablet by mouth Daily. 90 tablet 1 2/10/2025     clopidogrel (PLAVIX) 75 MG tablet Take 1 tablet by mouth Daily.   2/10/2025    hydrALAZINE (APRESOLINE) 10 MG tablet Take 1 tablet by mouth 2 (Two) Times a Day.   2/10/2025    metFORMIN (GLUCOPHAGE) 500 MG tablet Take 1 tablet by mouth 3 (Three) Times a Day.   2/10/2025    metoprolol succinate XL (TOPROL-XL) 100 MG 24 hr tablet Take 1 tablet by mouth 3 times a day.   2/10/2025    ondansetron (ZOFRAN) 4 MG tablet Take 1 tablet by mouth 2 (Two) Times a Day.   2/10/2025    pantoprazole (PROTONIX) 40 MG EC tablet Take 1 tablet by mouth Daily.   Taking    sucralfate (CARAFATE) 1 g tablet Take 1 tablet by mouth Every Other Day.   2/10/2025        Hospital medications:  amLODIPine, 5 mg, Oral, BID  [Held by provider] aspirin, 81 mg, Oral, Daily  atorvastatin, 20 mg, Oral, Q PM  carvedilol, 25 mg, Oral, BID With Meals  [Held by provider] clopidogrel, 75 mg, Oral, Daily  heparin (porcine), 5,000 Units, Subcutaneous, Q8H  hydrALAZINE, 25 mg, Oral, 4x Daily  insulin lispro, 2-7 Units, Subcutaneous, 4x Daily AC & at Bedtime  pantoprazole, 40 mg, Oral, Q AM  polyethylene glycol, 17 g, Oral, Daily  senna-docusate sodium, 1 tablet, Oral, BID      sodium chloride, 100 mL/hr, Last Rate: 100 mL/hr (02/11/25 1039)        acetaminophen **OR** acetaminophen **OR** acetaminophen    senna-docusate sodium **AND** polyethylene glycol **AND** bisacodyl **AND** bisacodyl    Calcium Replacement - Follow Nurse / BPA Driven Protocol    dextrose    dextrose    glucagon (human recombinant)    Magnesium Low Dose Replacement - Follow Nurse / BPA Driven Protocol    nicotine    nitroglycerin    ondansetron    Phosphorus Replacement - Follow Nurse / BPA Driven Protocol    Potassium Replacement - Follow Nurse / BPA Driven Protocol    [COMPLETED] Insert Peripheral IV **AND** sodium chloride    Family history:  Family History   Problem Relation Age of Onset    COPD Mother     Heart disease Mother     Stroke Mother     Hypertension Mother     Cancer Father         Social history:  Social History     Tobacco Use    Smoking status: Every Day     Current packs/day: 0.50     Types: Cigarettes    Smokeless tobacco: Never   Vaping Use    Vaping status: Never Used   Substance Use Topics    Alcohol use: No    Drug use: No       Review of systems:      Positive for:  nothing  Negative for:  chest pain, cough, sob, o/w neg    Objective:  TMax 24 hours:   Temp (24hrs), Av.8 °F (36.6 °C), Min:97.3 °F (36.3 °C), Max:98.3 °F (36.8 °C)      Vitals Ranges:   Temp:  [97.3 °F (36.3 °C)-98.3 °F (36.8 °C)] 98.3 °F (36.8 °C)  Heart Rate:  [58-68] 68  Resp:  [12-22] 14  BP: (144-194)/(42-75) 155/64    Intake/Output Last 3 shifts:  I/O last 3 completed shifts:  In: 720 [P.O.:720]  Out: 1750 [Urine:1750]     Physical Exam:    General Appearance:    Alert, cooperative, NAD   Back:     No CVA tenderness   Lungs:     Respirations unlabored, no wheezing    Heart:    RRR, intact peripheral pulses   Abdomen:     Soft, NDNT, no masses, no guarding   Neuro/Psych:   Orientation intact, mood/affect pleasant       Results review:   I reviewed the patient's new clinical results.    Data review:  Lab Results (last 24 hours)       Procedure Component Value Units Date/Time    POC Glucose Once [[id number]]  (Abnormal) Collected: 25 1550    Specimen: Blood Updated: 25 1552     Glucose 185 mg/dL      Comment: Serial Number: [id number]:  [id number]       POC Glucose 4x Daily Before Meals & at Bedtime [[id number]]  (Abnormal) Collected: 25 1111    Specimen: Blood Updated: 25 1113     Glucose 200 mg/dL      Comment: Serial Number: [id number]:  [id number]       POC Glucose Once [[id number]]  (Abnormal) Collected: 25 1038    Specimen: Blood Updated: 25 1042     Glucose 186 mg/dL      Comment: Serial Number: [id number]:  [id number]       Basic Metabolic Panel [[id number]]  (Abnormal) Collected: 25 0615    Specimen: Blood from Arm, Left Updated: 25  0653     Glucose 135 mg/dL      BUN 37 mg/dL      Creatinine 2.28 mg/dL      Sodium 137 mmol/L      Potassium 5.2 mmol/L      Comment: Specimen hemolyzed.  Result may be falsely elevated.        Chloride 102 mmol/L      CO2 24.3 mmol/L      Calcium 9.1 mg/dL      BUN/Creatinine Ratio 16.2     Anion Gap 10.7 mmol/L      eGFR 27.3 mL/min/1.73     Narrative:      GFR Categories in Chronic Kidney Disease (CKD)      GFR Category          GFR (mL/min/1.73)    Interpretation  G1                     90 or greater         Normal or high (1)  G2                      60-89                Mild decrease (1)  G3a                   45-59                Mild to moderate decrease  G3b                   30-44                Moderate to severe decrease  G4                    15-29                Severe decrease  G5                    14 or less           Kidney failure          (1)In the absence of evidence of kidney disease, neither GFR category G1 or G2 fulfill the criteria for CKD.    eGFR calculation 2021 CKD-EPI creatinine equation, which does not include race as a factor    Magnesium [[id number]]  (Normal) Collected: 02/11/25 0615    Specimen: Blood from Arm, Left Updated: 02/11/25 0653     Magnesium 2.2 mg/dL     Phosphorus [[id number]]  (Abnormal) Collected: 02/11/25 0615    Specimen: Blood from Arm, Left Updated: 02/11/25 0652     Phosphorus 4.8 mg/dL     CBC Auto Differential [[id number]]  (Abnormal) Collected: 02/11/25 0615    Specimen: Blood from Arm, Left Updated: 02/11/25 0627     WBC 6.91 10*3/mm3      RBC 3.42 10*6/mm3      Hemoglobin 9.9 g/dL      Hematocrit 30.6 %      MCV 89.5 fL      MCH 28.9 pg      MCHC 32.4 g/dL      RDW 13.1 %      RDW-SD 43.1 fl      MPV 10.8 fL      Platelets 200 10*3/mm3      Neutrophil % 63.8 %      Lymphocyte % 20.3 %      Monocyte % 12.0 %      Eosinophil % 2.9 %      Basophil % 0.6 %      Immature Grans % 0.4 %      Neutrophils, Absolute 4.41 10*3/mm3      Lymphocytes, Absolute 1.40  10*3/mm3      Monocytes, Absolute 0.83 10*3/mm3      Eosinophils, Absolute 0.20 10*3/mm3      Basophils, Absolute 0.04 10*3/mm3      Immature Grans, Absolute 0.03 10*3/mm3      nRBC 0.0 /100 WBC     Glomerular Basement Membrane Antibodies [[id number]] Collected: 02/11/25 0615    Specimen: Blood from Arm, Left Updated: 02/11/25 0625    ANCA Panel [[id number]] Collected: 02/11/25 0615    Specimen: Blood from Arm, Left Updated: 02/11/25 0625    POC Glucose Once [[id number]]  (Abnormal) Collected: 02/11/25 0559    Specimen: Blood Updated: 02/11/25 0602     Glucose 136 mg/dL      Comment: Serial Number: [id number]:  [id number]       Creatinine Urine Random (kidney function) GFR component - Urine, Clean Catch [[id number]] Collected: 02/10/25 1600    Specimen: Urine, Clean Catch Updated: 02/11/25 0257     Creatinine, Urine 39.5 mg/dL     Narrative:      Reference intervals for random urine have not been established.  Clinical usage is dependent upon physician's interpretation in combination with other laboratory tests.       Sodium, Urine, Random - Urine, Clean Catch [[id number]] Collected: 02/10/25 1600    Specimen: Urine, Clean Catch Updated: 02/10/25 2026     Sodium, Urine 74 mmol/L     Narrative:      Reference intervals for random urine have not been established.  Clinical usage is dependent upon physician's interpretation in combination with other laboratory tests.       POC Glucose Once [[id number]]  (Abnormal) Collected: 02/10/25 1925    Specimen: Blood Updated: 02/10/25 1926     Glucose 324 mg/dL      Comment: Serial Number: [id number]:  [id number]       POC Glucose Once [[id number]]  (Abnormal) Collected: 02/10/25 1729    Specimen: Blood Updated: 02/10/25 1732     Glucose 130 mg/dL      Comment: Serial Number: [id number]:  [id number]       Comprehensive Metabolic Panel [[id number]]  (Abnormal) Collected: 02/10/25 1545    Specimen: Blood from Arm, Left Updated: 02/10/25 1613     Glucose 133 mg/dL       BUN 35 mg/dL      Creatinine 2.23 mg/dL      Sodium 136 mmol/L      Potassium 5.1 mmol/L      Chloride 100 mmol/L      CO2 23.9 mmol/L      Calcium 9.7 mg/dL      Total Protein 7.1 g/dL      Albumin 4.3 g/dL      ALT (SGPT) 11 U/L      AST (SGOT) 16 U/L      Alkaline Phosphatase 76 U/L      Total Bilirubin <0.2 mg/dL      Globulin 2.8 gm/dL      A/G Ratio 1.5 g/dL      BUN/Creatinine Ratio 15.7     Anion Gap 12.1 mmol/L      eGFR 28.0 mL/min/1.73     Narrative:      GFR Categories in Chronic Kidney Disease (CKD)      GFR Category          GFR (mL/min/1.73)    Interpretation  G1                     90 or greater         Normal or high (1)  G2                      60-89                Mild decrease (1)  G3a                   45-59                Mild to moderate decrease  G3b                   30-44                Moderate to severe decrease  G4                    15-29                Severe decrease  G5                    14 or less           Kidney failure          (1)In the absence of evidence of kidney disease, neither GFR category G1 or G2 fulfill the criteria for CKD.    eGFR calculation 2021 CKD-EPI creatinine equation, which does not include race as a factor    Urinalysis With Microscopic If Indicated (No Culture) - Urine, Clean Catch [[id number]]  (Abnormal) Collected: 02/10/25 1600    Specimen: Urine, Clean Catch Updated: 02/10/25 1608     Color, UA Yellow     Appearance, UA Clear     pH, UA 7.0     Specific Gravity, UA 1.011     Glucose, UA Negative     Ketones, UA Negative     Bilirubin, UA Negative     Blood, UA Small (1+)     Protein, UA >=300 mg/dL (3+)     Leuk Esterase, UA Negative     Nitrite, UA Negative     Urobilinogen, UA 0.2 E.U./dL    Urinalysis, Microscopic Only - Urine, Clean Catch [[id number]]  (Abnormal) Collected: 02/10/25 1600    Specimen: Urine, Clean Catch Updated: 02/10/25 1608     RBC, UA 21-50 /HPF      WBC, UA 6-10 /HPF      Bacteria, UA None Seen /HPF      Squamous Epithelial Cells,  UA 0-2 /HPF      Hyaline Casts, UA 3-6 /LPF      Methodology Automated Microscopy    Extra Tubes [[id number]] Collected: 02/10/25 1545    Specimen: Blood from Arm, Left Updated: 02/10/25 1600    Narrative:      The following orders were created for panel order Extra Tubes.  Procedure                               Abnormality         Status                     ---------                               -----------         ------                     Gold Top - SST[[id number]]                                   Final result               Light Blue Top[[id number]]                                   Final result                 Please view results for these tests on the individual orders.    Gold Top - SST [[id number]] Collected: 02/10/25 1545    Specimen: Blood from Arm, Left Updated: 02/10/25 1600     Extra Tube Hold for add-ons.     Comment: Auto resulted.       Light Blue Top [[id number]] Collected: 02/10/25 1545    Specimen: Blood from Arm, Left Updated: 02/10/25 1600     Extra Tube Hold for add-ons.     Comment: Auto resulted       CBC & Differential [[id number]]  (Abnormal) Collected: 02/10/25 1545    Specimen: Blood from Arm, Left Updated: 02/10/25 1555    Narrative:      The following orders were created for panel order CBC & Differential.  Procedure                               Abnormality         Status                     ---------                               -----------         ------                     CBC Auto Differential[[id number]]        Abnormal            Final result                 Please view results for these tests on the individual orders.    CBC Auto Differential [[id number]]  (Abnormal) Collected: 02/10/25 1545    Specimen: Blood from Arm, Left Updated: 02/10/25 1555     WBC 7.53 10*3/mm3      RBC 3.83 10*6/mm3      Hemoglobin 11.3 g/dL      Hematocrit 34.4 %      MCV 89.8 fL      MCH 29.5 pg      MCHC 32.8 g/dL      RDW 13.1 %      RDW-SD 43.0 fl      MPV 10.5 fL      Platelets 201 10*3/mm3       Neutrophil % 65.5 %      Lymphocyte % 20.2 %      Monocyte % 11.3 %      Eosinophil % 2.0 %      Basophil % 0.7 %      Immature Grans % 0.3 %      Neutrophils, Absolute 4.94 10*3/mm3      Lymphocytes, Absolute 1.52 10*3/mm3      Monocytes, Absolute 0.85 10*3/mm3      Eosinophils, Absolute 0.15 10*3/mm3      Basophils, Absolute 0.05 10*3/mm3      Immature Grans, Absolute 0.02 10*3/mm3      nRBC 0.0 /100 WBC              Imaging:  Imaging Results (Last 24 Hours)       Procedure Component Value Units Date/Time    US Renal Bilateral [[id number]] Collected: 02/11/25 1030     Updated: 02/11/25 1037    Narrative:      US RENAL BILATERAL    Date of Exam: 2/11/2025 10:06 AM EST    Indication: GWEN/CKD.    Comparison: CT abdomen pelvis 4/8/2019    Technique: Grayscale and color Doppler ultrasound evaluation of the kidneys and urinary bladder was performed.      Findings:  Right kidney measures 8.3 x 3.6 x 4.5 cm and the left 8.8 x 4.2 x 5.7 cm. Increased renal cortical echogenicity on the right, possibly artifactual.. Diffuse renal cortical thinning favor senescent change. Nonspecific hypoechoic lesion exophytic off the   left upper renal pole measuring up to 2 cm stable from 2019. 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 No hydronephrosis or echogenic shadowing stone. Large solid vascular lesion in the posterior aspect of the   urinary bladder measuring up to 8.7 x 5.7 x 4.9 cm. This is more than expected for simple BPH and median lobe hypertrophy. Bladder volume 587 cc.      Impression:      Impression:  1. No hydronephrosis.  2. Symmetric renal atrophy and diffuse renal cortical thinning.  3. Incidental large intraluminal bladder mass suspicious for neoplasm. Recommend urology consultation for management.    Findings communicated with ordering provider Dr. Romero via [employer] secure chat, message acknowledged 10:35 a.m. 2/11/2025.    Electronically Signed: Dave Buckley MD    2/11/2025  10:35 AM EST    Workstation ID: [id number]    XR Chest 1 View [[id number]] Collected: 02/10/25 1656     Updated: 02/10/25 1658    Narrative:      XR CHEST 1 VW    Date of Exam: 2/10/2025 4:51 PM EST    Indication: hypertension    Comparison: AP chest 4/1/2019    Findings:  Heart size is mildly enlarged but stable with signs of median sternotomy CABG. Pulmonary vascular distribution is normal. Lungs are clear. No pleural effusion, pneumothorax or acute osseous abnormality.      Impression:      Impression:  No acute chest finding.      Electronically Signed: Jenn Olmedo MD    2/10/2025 4:56 PM EST    Workstation ID: [id number]               Assessment:     5 cm bladder tumor right side of trigone found on cystoscopy November 2024    Plan:     [facility] will sign off.  Ultimately, this patient needs to have bladder tumor removed TURBT and also planned to have a TURP procedure done at the same time by Dr. Kevin Rodriguez.  These efforts would need to be coordinated after cardiac clearance and after Plavix aspirin held for 5 days.    Follow-up with Dr. Michael layton urology after discharge.    Durga Corrales, TOREY  02/11/25  15:54 EST

## 2025-02-11 NOTE — PLAN OF CARE
Goal Outcome Evaluation:  Plan of Care Reviewed With: patient   Dave Peguero presents as an 84 y.o. male with a CMH of CAD, PVD, htn, suspected bladder malignancy, BPH, T2DM, tobacco use who presented to [facility] on 2/10/2025 with hypertensive urgency. CXR (-). Pt A and O x 4. Pt resides with spouse, son and grandson in bi-level home with 7 AYANA and 13 interior steps. Pt drives and uses no AD for independence with community mobility. Pt reports having hospice at home. Pt hypertensive this date with BP post gait at 186/68 - Nursing aware. Pt transfers with CGA and ambulated 100 feet with CGA . Pt repoirts feeling weaker over recent months. Pt is not interested in  PT and reports he has good support at home from his family. PT will follow pt while he is hospital with PT recommendation of Home with Assist.              Anticipated Discharge Disposition (PT): home with assist

## 2025-02-11 NOTE — PLAN OF CARE
Problem: Adult Inpatient Plan of Care  Goal: Plan of Care Review  Outcome: Progressing  Goal: Patient-Specific Goal (Individualized)  Outcome: Progressing  Goal: Absence of Hospital-Acquired Illness or Injury  Outcome: Progressing  Intervention: Identify and Manage Fall Risk  Recent Flowsheet Documentation  Taken 2/11/2025 1626 by Tawny Venegas LPN  Safety Promotion/Fall Prevention:   activity supervised   assistive device/personal items within reach   clutter free environment maintained   lighting adjusted   safety round/check completed   room organization consistent  Taken 2/11/2025 1426 by Tawny Venegas LPN  Safety Promotion/Fall Prevention:   activity supervised   assistive device/personal items within reach   clutter free environment maintained   lighting adjusted  Taken 2/11/2025 1200 by Tawny Venegas LPN  Safety Promotion/Fall Prevention:   activity supervised   assistive device/personal items within reach   clutter free environment maintained   lighting adjusted   room organization consistent   safety round/check completed  Taken 2/11/2025 1006 by Tawny Venegas LPN  Safety Promotion/Fall Prevention: patient off unit  Intervention: Prevent Skin Injury  Recent Flowsheet Documentation  Taken 2/11/2025 1626 by Tawny Venegas LPN  Skin Protection: incontinence pads utilized  Taken 2/11/2025 1200 by Tawny Venegas LPN  Skin Protection: incontinence pads utilized  Intervention: Prevent and Manage VTE (Venous Thromboembolism) Risk  Recent Flowsheet Documentation  Taken 2/11/2025 1200 by Tawny Venegas LPN  VTE Prevention/Management:   SCDs (sequential compression devices) off   patient refused intervention  Intervention: Prevent Infection  Recent Flowsheet Documentation  Taken 2/11/2025 1626 by Tawny Venegas LPN  Infection Prevention:   cohorting utilized   environmental surveillance performed   hand hygiene promoted   personal protective equipment utilized   rest/sleep promoted   single  patient room provided   visitors restricted/screened  Taken 2/11/2025 1426 by Tawny Venegas LPN  Infection Prevention:   cohorting utilized   environmental surveillance performed   hand hygiene promoted   personal protective equipment utilized   rest/sleep promoted   single patient room provided   visitors restricted/screened  Taken 2/11/2025 1200 by Tawny Venegas LPN  Infection Prevention:   cohorting utilized   environmental surveillance performed   hand hygiene promoted   personal protective equipment utilized   rest/sleep promoted   single patient room provided   visitors restricted/screened  Goal: Optimal Comfort and Wellbeing  Outcome: Progressing  Intervention: Monitor Pain and Promote Comfort  Recent Flowsheet Documentation  Taken 2/11/2025 1626 by Tawny Venegas LPN  Pain Management Interventions:   care clustered   diversional activity provided  Taken 2/11/2025 1200 by Tawny Venegas LPN  Pain Management Interventions:   care clustered   diversional activity provided  Intervention: Provide Person-Centered Care  Recent Flowsheet Documentation  Taken 2/11/2025 1626 by Tawny Venegas LPN  Trust Relationship/Rapport:   choices provided   care explained  Taken 2/11/2025 1200 by Tawny Venegas LPN  Trust Relationship/Rapport:   care explained   choices provided   thoughts/feelings acknowledged  Goal: Readiness for Transition of Care  Outcome: Progressing   Goal Outcome Evaluation:              Patient worked with therapy this shift. Patient went for renal ultrasound this shift. Patient to have stress test tomorrow 2/12/25. Patient has red colored urine noted this shift. Patient has urology consult related to bladder mass. Patient has no c/o pain. B/P varies this shift with medications given per order. Patient has been up in chair this shift. Patient refuses alarms throughout the day with education about the alarms given to patient. Patient transferred self to chair with therapy & nurse in room.  Patient is alert & able to make needs known. Continues on IV fluids per order. Respirations unlabored with no sob noted on room air. Call light within reach.

## 2025-02-11 NOTE — CASE MANAGEMENT/SOCIAL WORK
Continued Stay Note  VIKAS Diane     Patient Name: Dave Peguero  MRN: [id number]  Today's Date: 2/11/2025    Admit Date: 2/10/2025    Plan: D/C Plan: Home with [facility]. (Current pt.)   Discharge Plan       Row Name 02/11/25 1156       Plan    Plan D/C Plan: Home with [facility]. (Current pt.)                       Nadia Mcguire RN  RN/.  Office Ph. [phone]  Cell Ph.  [phone]

## 2025-02-11 NOTE — PLAN OF CARE
Problem: Adult Inpatient Plan of Care  Goal: Absence of Hospital-Acquired Illness or Injury  Intervention: Identify and Manage Fall Risk  Recent Flowsheet Documentation  Taken 2/10/2025 2000 by Isamar Silva RN  Safety Promotion/Fall Prevention:   room organization consistent   safety round/check completed   nonskid shoes/slippers when out of bed   clutter free environment maintained  Goal: Optimal Comfort and Wellbeing  Intervention: Monitor Pain and Promote Comfort  Recent Flowsheet Documentation  Taken 2/10/2025 2117 by Isamar Silva RN  Pain Management Interventions: pain medication given  Intervention: Provide Person-Centered Care  Recent Flowsheet Documentation  Taken 2/10/2025 2000 by Isamar Silva RN  Trust Relationship/Rapport:   care explained   questions answered   questions encouraged   thoughts/feelings acknowledged   Goal Outcome Evaluation:

## 2025-02-11 NOTE — CONSULTS
RENAL/KCC:    Referring Provider: Dr. Arias  Reason for Consultation:     Subjective GWEN/CKD    Chief complaint: HTN    History of present illness:  Patient is an 85 yo male who has CKD with Cr as high as 1.8-1.9 on labs done in September and December 2024.  He has additional h/o bladder mass with gross hematuria.  He had an appointment yesterday with Cardiology for pre-op eval for bladder mass/prostate resection.  He was noted to be hypertensive and was sent to the ER for evaluation.  In the ER his Cr is 2.3.  UA with >300 protein, 21-50 RBC's and 6-10 WBC's.  K is 5.1. BP as high as 210/89 in the ER.  Systolic now down to the 160's. No other complaints at present.    History  Past Medical History:   Diagnosis Date    Arthritis     Atherosclerosis of autologous vein bypass graft of right lower extremity with intermittent claudication     Atherosclerosis of native arteries of extremities with intermittent claudication, left leg     Cancer     breast cancer hx    Carotid artery stenosis     Cerebral vascular disease     CHF (congestive heart failure)     Coronary artery disease     Diabetes mellitus     type 1    Hyperlipidemia     Hypertension     Kidney stone     Myocardial infarction     x 3    and   Past Surgical History:   Procedure Laterality Date    CARDIAC CATHETERIZATION      CORONARY ARTERY BYPASS GRAFT      TRIPLE    DENTAL PROCEDURE      all teeth have been pulled    FEMORAL ARTERY STENT Bilateral     JOINT REPLACEMENT Right     knee    PROSTATE SURGERY         Review of Systems  Pertinent items are noted in HPI    Objective     Vital Signs  Temp:  [97.3 °F (36.3 °C)-98 °F (36.7 °C)] 97.3 °F (36.3 °C)  Heart Rate:  [58-66] 62  Resp:  [12-20] 12  BP: (144-210)/(42-89) 161/51    I/O this shift:  In: 720 [P.O.:720]  Out: 850 [Urine:850]  No intake/output data recorded.    Physical Exam:  GEN: Awake, NAD  ENT: PERRL, EOMI, MMM  NECK: Supple, no JVD  CHEST: CTAB, no W/R/C  CV: RRR, no M/G/R  ABD: Soft, NT,  "+BS  SKIN: Warm and Dry  NEURO: CN's intact      Results Review:   I reviewed the patient's new clinical results.    WBC WBC   Date Value Ref Range Status   02/10/2025 7.53 3.40 - 10.80 10*3/mm3 Final      HGB Hemoglobin   Date Value Ref Range Status   02/10/2025 11.3 (L) 13.0 - 17.7 g/dL Final      HCT Hematocrit   Date Value Ref Range Status   02/10/2025 34.4 (L) 37.5 - 51.0 % Final      Platlets No results found for: \"LABPLAT\"   MCV MCV   Date Value Ref Range Status   02/10/2025 89.8 79.0 - 97.0 fL Final          Sodium Sodium   Date Value Ref Range Status   02/10/2025 136 136 - 145 mmol/L Final      Potassium Potassium   Date Value Ref Range Status   02/10/2025 5.1 3.5 - 5.2 mmol/L Final      Chloride Chloride   Date Value Ref Range Status   02/10/2025 100 98 - 107 mmol/L Final      CO2 CO2   Date Value Ref Range Status   02/10/2025 23.9 22.0 - 29.0 mmol/L Final      BUN BUN   Date Value Ref Range Status   02/10/2025 35 (H) 8 - 23 mg/dL Final      Creatinine Creatinine   Date Value Ref Range Status   02/10/2025 2.23 (H) 0.76 - 1.27 mg/dL Final      Calcium Calcium   Date Value Ref Range Status   02/10/2025 9.7 8.6 - 10.5 mg/dL Final      PO4 No results found for: \"CAPO4\"   Albumin Albumin   Date Value Ref Range Status   02/10/2025 4.3 3.5 - 5.2 g/dL Final      Magnesium No results found for: \"MG\"   Uric Acid No results found for: \"URICACID\"         amLODIPine, 5 mg, Oral, BID  aspirin, 81 mg, Oral, Daily  atorvastatin, 20 mg, Oral, Q PM  clopidogrel, 75 mg, Oral, Daily  heparin (porcine), 5,000 Units, Subcutaneous, Q8H  hydrALAZINE, 10 mg, Oral, 4x Daily  insulin lispro, 2-7 Units, Subcutaneous, 4x Daily AC & at Bedtime  metoprolol succinate XL, 100 mg, Oral, Q12H  pantoprazole, 40 mg, Oral, Q AM           Assessment & Plan     1) GWEN  2) CKD - Cr 1.8-1.9 at the end of 2024  3) Hematuria  4) Proteinuria - reassess when less acute and hematuria resolved  5) HTN urgency  6) Bladder mass    PLAN: Check renal US to " exclude any obstruction given his h/o bladder mass and clots.  Gentle IVF today.  Continue present BP meds and start titrating Hydralazine as needed (will increase to 25 mg TID).  Cardiology consulted.  Needs eventual Urologic intervention for bladder mass and prostate issues.  Will follow closely.        Luis Romero MD  [facility]  02/11/25  @NOW

## 2025-02-11 NOTE — CONSULTS
Cardiology [place]        Subjective:           Patient ID: Dave Peguero is a 84 y.o. male.    Chief Complaint: HTN Urgency, pre operative risk assessment     Referring Physician: Fidel Arias MD   Seen and examined greater than 50% of total encounter time in medical decision making performed by me    HPI:  Dave Peguero is a 84 y.o. male who presents with hypertensive urgency from cardiology office.  Mr. Peguero was seen by Dr. Milton yesterday for new patient appointment. He previously saw Dr. Galan in 2019. Pmh includes peripheral vascular disease s/p previous lower extremity bypass and stenting, CAD s/p prior CABG (left internal mammary graft to left anterior descending, vein graft to the obtuse marginal and vein graft to the acute marginal branch of the right coronary artery with laser TMR of the diaphragmatic surface of the left ventricle), CKD, diabetes mellitus, hypertension, hyperlipidemia, carotid artery stenosis and breast cancer.     Mr. Peguero presented to cardiology office yesterday to have pre operative risk assessment prior to bladder tumor resection with prostate resection. He was noted to be hypertensive with SBP > 200 and was advised to come to the ER.    Patient tells me he is able to do all his ADLs. He lives independently with his wife. He climbs stairs and denies chest pain but has some minor shortness of breath.     Review of systems otherwise negative x 14 point review of systems except as mentioned above  Historical data copied forward from previous encounters in EMR is unchanged    Hemodynamics reviewed  Telemetry vitals reviewed      Past Medical History:   Diagnosis Date    Arthritis     Atherosclerosis of autologous vein bypass graft of right lower extremity with intermittent claudication     Atherosclerosis of native arteries of extremities with intermittent claudication, left leg     Cancer     breast cancer hx    Carotid artery stenosis     Cerebral vascular disease      CHF (congestive heart failure)     Coronary artery disease     Diabetes mellitus     type 1    Hyperlipidemia     Hypertension     Kidney stone     Myocardial infarction     x 3       Past Surgical History:   Procedure Laterality Date    CARDIAC CATHETERIZATION      CORONARY ARTERY BYPASS GRAFT      TRIPLE    DENTAL PROCEDURE      all teeth have been pulled    FEMORAL ARTERY STENT Bilateral     JOINT REPLACEMENT Right     knee    PROSTATE SURGERY         Family History   Problem Relation Age of Onset    COPD Mother     Heart disease Mother     Stroke Mother     Hypertension Mother     Cancer Father        Social History     Socioeconomic History    Marital status:    Tobacco Use    Smoking status: Every Day     Current packs/day: 0.50     Types: Cigarettes    Smokeless tobacco: Never   Vaping Use    Vaping status: Never Used   Substance and Sexual Activity    Alcohol use: No    Drug use: No    Sexual activity: Defer         Allergies   Allergen Reactions    Benadryl [Diphenhydramine] Unknown (See Comments)     Unknown allergy, but daughter is also allergic and states it causes throat swelling with her.       Current Medications:   Scheduled Meds:amLODIPine, 5 mg, Oral, BID  aspirin, 81 mg, Oral, Daily  atorvastatin, 20 mg, Oral, Q PM  clopidogrel, 75 mg, Oral, Daily  heparin (porcine), 5,000 Units, Subcutaneous, Q8H  hydrALAZINE, 25 mg, Oral, 4x Daily  insulin lispro, 2-7 Units, Subcutaneous, 4x Daily AC & at Bedtime  metoprolol succinate XL, 100 mg, Oral, Q12H  pantoprazole, 40 mg, Oral, Q AM      Continuous Infusions:sodium chloride, 100 mL/hr, Last Rate: 100 mL/hr (02/11/25 1039)        Review of Systems   Constitutional: Negative for chills, diaphoresis and malaise/fatigue.   Cardiovascular:  Positive for dyspnea on exertion. Negative for chest pain, irregular heartbeat, leg swelling, near-syncope, orthopnea, palpitations, paroxysmal nocturnal dyspnea and syncope.   Respiratory:  Negative for cough,  "shortness of breath, sleep disturbances due to breathing and sputum production.    Gastrointestinal:  Negative for change in bowel habit.   Genitourinary:  Positive for hematuria. Negative for urgency.   Neurological:  Negative for dizziness and headaches.   Psychiatric/Behavioral:  Negative for altered mental status.           Objective:         /56 (BP Location: Right arm, Patient Position: Lying)   Pulse 61   Temp 98 °F (36.7 °C) (Oral)   Resp 22   Ht 175.3 cm (69\")   Wt 89.3 kg (196 lb 13.9 oz)   SpO2 94%   BMI 29.07 kg/m²     Physical Exam:  General Appearance:    Alert, cooperative, in no acute distress                                Head: Atraumatic, normocephalic, PERRLA               Neck:   supple, trachea midline, no thyromegaly, no carotid bruit, no JVD   Lungs:     Clear to auscultation,respirations regular, even and               unlabored    Heart:    Regular rhythm and normal rate, normal S1 and S2, murmur   Abdomen:     Normal bowel sounds, no masses, no organomegaly, soft  nontender, nondistended, no guarding, no rebound  tenderness   Extremities:   Moves all extremities well, no edema, no cyanosis, no  redness   Pulses:   Pulses palpable and equal bilaterally   Skin:   No bleeding, bruising or rash   Neurologic:   Awake, alert, oriented x3                 ASCVD Risk Score::  The ASCVD Risk score (Werner DK, et al., 2019) failed to calculate for the following reasons:    The 2019 ASCVD risk score is only valid for ages 40 to 79      Lab Review:     Results from last 7 days   Lab Units 02/11/25  0615 02/10/25  1545   SODIUM mmol/L 137 136   POTASSIUM mmol/L 5.2 5.1   CHLORIDE mmol/L 102 100   CO2 mmol/L 24.3 23.9   BUN mg/dL 37* 35*   CREATININE mg/dL 2.28* 2.23*   GLUCOSE mg/dL 135* 133*   CALCIUM mg/dL 9.1 9.7   AST (SGOT) U/L  --  16   ALT (SGPT) U/L  --  11         Results from last 7 days   Lab Units 02/11/25  0615 02/10/25  1545   WBC 10*3/mm3 6.91 7.53   HEMOGLOBIN g/dL 9.9* " "11.3*   HEMATOCRIT % 30.6* 34.4*   PLATELETS 10*3/mm3 200 201         Results from last 7 days   Lab Units 02/11/25  0615   MAGNESIUM mg/dL 2.2           Invalid input(s): \"LDLCALC\"            Recent Radiology:  Imaging Results (Most Recent)       Procedure Component Value Units Date/Time    US Renal Bilateral [[id number]] Collected: 02/11/25 1030     Updated: 02/11/25 1037    Narrative:      US RENAL BILATERAL    Date of Exam: 2/11/2025 10:06 AM EST    Indication: GWEN/CKD.    Comparison: CT abdomen pelvis 4/8/2019    Technique: Grayscale and color Doppler ultrasound evaluation of the kidneys and urinary bladder was performed.      Findings:  Right kidney measures 8.3 x 3.6 x 4.5 cm and the left 8.8 x 4.2 x 5.7 cm. Increased renal cortical echogenicity on the right, possibly artifactual.. Diffuse renal cortical thinning favor senescent change. Nonspecific hypoechoic lesion exophytic off the   left upper renal pole measuring up to 2 cm stable from 2019. 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 No hydronephrosis or echogenic shadowing stone. Large solid vascular lesion in the posterior aspect of the   urinary bladder measuring up to 8.7 x 5.7 x 4.9 cm. This is more than expected for simple BPH and median lobe hypertrophy. Bladder volume 587 cc.      Impression:      Impression:  1. No hydronephrosis.  2. Symmetric renal atrophy and diffuse renal cortical thinning.  3. Incidental large intraluminal bladder mass suspicious for neoplasm. Recommend urology consultation for management.    Findings communicated with ordering provider Dr. Romero via [facility] secure chat, message acknowledged 10:35 a.m. 2/11/2025.    Electronically Signed: Dave Buckley MD    2/11/2025 10:35 AM EST    Workstation ID: [id number]    XR Chest 1 View [[id number]] Collected: 02/10/25 1656     Updated: 02/10/25 1658    Narrative:      XR CHEST 1 VW    Date of Exam: 2/10/2025 4:51 PM EST    Indication: " hypertension    Comparison: AP chest 4/1/2019    Findings:  Heart size is mildly enlarged but stable with signs of median sternotomy CABG. Pulmonary vascular distribution is normal. Lungs are clear. No pleural effusion, pneumothorax or acute osseous abnormality.      Impression:      Impression:  No acute chest finding.      Electronically Signed: Jenn Olmedo MD    2/10/2025 4:56 PM EST    Workstation ID: [id number]              ECHOCARDIOGRAM:    Results for orders placed in visit on 04/25/19    Adult Transthoracic Echo Complete W/ Cont if Necessary Per Protocol    Interpretation Summary  · Left ventricular systolic function is normal. Calculated EF = 70%. Estimated EF was in agreement with the calculated EF. The left ventricular cavity is small. Left ventricular wall thickness is consistent with mild-to-moderate concentric hypertrophy. Left ventricular diastolic dysfunction is noted (grade I) consistent with impaired relaxation.  · Left atrial cavity size is mildly dilated.  · The aortic valve is not well visualized. The aortic valve is abnormal in structure. There is moderate-to-severe thickening of the aortic valve.  · By doppler, there is no significant aortic stenosis, but it visually appears to be mildly stenotic.  · Borderline dilation of the aortic root is present.                  Assessment:         Active Hospital Problems    Diagnosis  POA    **Hypertensive urgency [I16.0]  Yes     HTN Urgency    2. Pre operative risk assessment  Will need stress testing    3. Bladder mass    4. CAD   CAD s/p prior CABG (left internal mammary graft to left anterior descending, vein graft to the obtuse marginal and vein graft to the acute marginal branch of the right coronary artery with laser TMR of the diaphragmatic surface of the left ventricle)    5. PVD   s/p previous lower extremity bypass and stenting    6. HLD    7. GWEN on CKD     Plan:   Will check 2D ECHO  Will need stress evaluation prior to discharge  perioperatively  Medication optimization, amlodipine 5 twice daily  Hydralazine increased 25 every 8 hours, advance as tolerated  Change Toprol to carvedilol twice daily, 25 mg    Follow hemodynamics blood pressures, goal less than 140 systolic  Will schedule ischemic evaluation once has reached goal, perioperative valuation, significant hematuria and bladder cancer    Ryan Milton MD, PhD             Huong Viramontes, APRN  02/11/25  11:01 EST

## 2025-02-11 NOTE — THERAPY EVALUATION
Patient Name: Dave Peguero  : 1940    MRN: [id number]                              Today's Date: 2025       Admit Date: 2/10/2025    Visit Dx:     ICD-10-CM ICD-9-CM   1. 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     Atherosclerosis of native arteries of extremities with intermittent claudication, left leg     Cancer     breast cancer hx    Carotid artery stenosis     Cerebral vascular disease     CHF (congestive heart failure)     Coronary artery disease     Diabetes mellitus     type 1    Hyperlipidemia     Hypertension     Kidney stone     Myocardial infarction     x 3     Past Surgical History:   Procedure Laterality Date    CARDIAC CATHETERIZATION      CORONARY ARTERY BYPASS GRAFT      TRIPLE    DENTAL PROCEDURE      all teeth have been pulled    FEMORAL ARTERY STENT Bilateral     JOINT REPLACEMENT Right     knee    PROSTATE SURGERY        General Information       Row Name 25 1455          Physical Therapy Time and Intention    Document Type evaluation  -BR     Mode of Treatment physical therapy  -BR       Row Name 25 1455          General Information    Patient Profile Reviewed yes  -BR     Prior Level of Function independent:;all household mobility;gait;transfer;driving;community mobility  -BR     Barriers to Rehab medically complex  -BR       Row Name 25 1455          Living Environment    People in Home spouse;child(savannah), adult;grandchild(savannah)  -BR       Row Name 25 1455          Home Main Entrance    Number of Stairs, Main Entrance seven  -BR     Stair Railings, Main Entrance railings safe and in good condition  -BR       Row Name 25 1455          Stairs Within Home, Primary    Stairs, Within  Home, Primary bi-level home  -BR     Number of Stairs, Within Home, Primary other (see comments)  13 steps  -BR       Row Name 02/11/25 1455          Cognition    Orientation Status (Cognition) oriented x 4  -BR       Row Name 02/11/25 1455          Safety Issues/Impairments Affecting Functional Mobility    Impairments Affecting Function (Mobility) endurance/activity tolerance;strength  -BR               User Key  (r) = Recorded By, (t) = Taken By, (c) = Cosigned By      Initials Name Provider Type    Gayla Daly PT Physical Therapist                   Mobility       Row Name 02/11/25 1456          Bed Mobility    Bed Mobility supine-sit  -BR     Supine-Sit [place] (Bed Mobility) standby assist  -BR     Assistive Device (Bed Mobility) head of bed elevated  -BR       Row Name 02/11/25 1456          Bed-Chair Transfer    Bed-Chair [place] (Transfers) contact guard  -BR       Row Name 02/11/25 1456          Sit-Stand Transfer    Sit-Stand [place] (Transfers) supervision  -BR       Row Name 02/11/25 1456          Gait/Stairs (Locomotion)    [place] Level (Gait) contact guard  -BR     Patient was able to Ambulate yes  -BR     Distance in Feet (Gait) 100  -BR     Deviations/Abnormal Patterns (Gait) base of support, wide  -BR     Bilateral Gait Deviations forward flexed posture  -BR               User Key  (r) = Recorded By, (t) = Taken By, (c) = Cosigned By      Initials Name Provider Type    Gayla Daly PT Physical Therapist                   Obj/Interventions       Row Name 02/11/25 1457          Range of Motion Comprehensive    General Range of Motion bilateral lower extremity ROM WFL  -BR       Row Name 02/11/25 1457          Strength Comprehensive (MMT)    Comment, General Manual Muscle Testing (MMT) Assessment BLE strength grossly 3+/5  -BR       Row Name 02/11/25 1457          Balance    Balance Assessment sitting static balance;standing static balance  -BR     Static Sitting  Balance modified independence  -BR     Position, Sitting Balance unsupported;sitting in chair;sitting edge of bed  -BR     Static Standing Balance supervision  -BR     Position/Device Used, Standing Balance unsupported  -BR       Row Name 02/11/25 9845          Sensory Assessment (Somatosensory)    Sensory Assessment (Somatosensory) sensation intact  -BR               User Key  (r) = Recorded By, (t) = Taken By, (c) = Cosigned By      Initials Name Provider Type    BR Gayla Fink, PT Physical Therapist                   Goals/Plan       Row Name 02/11/25 1509          Bed Mobility Goal 1 (PT)    Activity/Assistive Device (Bed Mobility Goal 1, PT) bed mobility activities, all  -BR     [place] Level/Cues Needed (Bed Mobility Goal 1, PT) independent  -BR     Time Frame (Bed Mobility Goal 1, PT) long term goal (LTG);2 weeks  -BR       Row Name 02/11/25 1503          Transfer Goal 1 (PT)    Activity/Assistive Device (Transfer Goal 1, PT) transfers, all  -BR     [place] Level/Cues Needed (Transfer Goal 1, PT) independent  -BR     Time Frame (Transfer Goal 1, PT) long term goal (LTG);2 weeks  -BR       Row Name 02/11/25 1505          Gait Training Goal 1 (PT)    Activity/Assistive Device (Gait Training Goal 1, PT) gait (walking locomotion)  -BR     [place] Level (Gait Training Goal 1, PT) independent  -BR     Distance (Gait Training Goal 1, PT) 150  -BR     Time Frame (Gait Training Goal 1, PT) long term goal (LTG);2 weeks  -BR       Row Name 02/11/25 1508          Stairs Goal 1 (PT)    Activity/Assistive Device (Stairs Goal 1, PT) stairs, all skills  -BR     [place] Level/Cues Needed (Stairs Goal 1, PT) contact guard required  -BR     Number of Stairs (Stairs Goal 1, PT) 7  -BR     Time Frame (Stairs Goal 1, PT) long term goal (LTG);2 weeks  -BR       Row Name 02/11/25 1500          Therapy Assessment/Plan (PT)    Planned Therapy Interventions (PT) balance training;bed mobility training;gait  training;patient/family education;stair training;strengthening;transfer training  -BR               User Key  (r) = Recorded By, (t) = Taken By, (c) = Cosigned By      Initials Name Provider Type    BR Gayla Fink, PT Physical Therapist                   Clinical Impression       Row Name 02/11/25 1458          Pain    Pretreatment Pain Rating 0/10 - no pain  -BR     Posttreatment Pain Rating 0/10 - no pain  -BR       Row Name 02/11/25 1508 02/11/25 1458       Plan of Care Review    Plan of Care Reviewed With -- patient  -BR    Outcome Evaluation Daev Peguero presents as an 84 y.o. male with a CMH of CAD, PVD, htn, suspected bladder malignancy, BPH, T2DM, tobacco use who presented to [facility] on 2/10/2025 with hypertensive urgency. CXR (-). Pt A and O x 4. Pt resides with spouse, son and grandson in bi-level home with 7 AYANA and 13 interior steps. Pt drives and uses no AD for independence with community mobility. Pt reports having hospice at home. Pt hypertensive this date with BP post gait at 186/68 - Nursing aware. Pt transfers with CGA and ambulated 100 feet with CGA . Pt repoirts feeling weaker over recent months. Pt is not interested in  PT and reports he has good support at home from his family. PT will follow pt while he is hospital with PT recommendation of Home with Assist.  -BR --      Row Name 02/11/25 1458          Therapy Assessment/Plan (PT)    Rehab Potential (PT) good  -BR     Criteria for Skilled Interventions Met (PT) yes;meets criteria;skilled treatment is necessary  -BR     Therapy Frequency (PT) 3 times/wk  -BR     Predicted Duration of Therapy Intervention (PT) until D/C  -BR       Row Name 02/11/25 1458          Vital Signs    Pre Systolic BP Rehab 151  -BR     Pre Treatment Diastolic BP 75  -BR     Intra Systolic BP Rehab 176  standing  -BR     Intra Treatment Diastolic BP 74  -BR     Post Systolic BP Rehab 186  -BR     Post Treatment Diastolic BP 68  -BR      Pretreatment Heart Rate (beats/min) 70  -BR     Pre SpO2 (%) 93  -BR     O2 Delivery Pre Treatment room air  -BR     Post SpO2 (%) 95  -BR     O2 Delivery Post Treatment room air  -BR     Pre Patient Position Supine  -BR     Intra Patient Position Standing  -BR     Post Patient Position Sitting  -BR       Row Name 02/11/25 1458          Positioning and Restraints    Pre-Treatment Position in bed  -BR     Post Treatment Position chair  -BR     In Chair notified nsg;reclined;call light within reach;encouraged to call for assist  -BR               User Key  (r) = Recorded By, (t) = Taken By, (c) = Cosigned By      Initials Name Provider Type    Gayla Daly, PT Physical Therapist                   Outcome Measures       Row Name 02/11/25 1510 02/11/25 0800       How much help from another person do you currently need...    Turning from your back to your side while in flat bed without using bedrails? 4  -BR 4  -MG    Moving from lying on back to sitting on the side of a flat bed without bedrails? 4  -BR 4  -MG    Moving to and from a bed to a chair (including a wheelchair)? 3  -BR 3  -MG    Standing up from a chair using your arms (e.g., wheelchair, bedside chair)? 4  -BR 3  -MG    Climbing 3-5 steps with a railing? 3  -BR 3  -MG    To walk in hospital room? 3  -BR 3  -MG    AM-PAC 6 Clicks Score (PT) 21  -BR 20  -MG    Highest Level of Mobility Goal 6 --> Walk 10 steps or more  -BR 6 --> Walk 10 steps or more  -MG      Row Name 02/11/25 1510          Functional Assessment    Outcome Measure Options AM-PAC 6 Clicks Basic Mobility (PT)  -BR               User Key  (r) = Recorded By, (t) = Taken By, (c) = Cosigned By      Initials Name Provider Type    Daisy Leblanc RN Registered Nurse    Gayla Daly, PT Physical Therapist                                 Physical Therapy Education       Title: PT OT SLP Therapies (Done)       Topic: Physical Therapy (Done)       Point: Mobility training (Done)        Learning Progress Summary            Patient Acceptance, E,D, VU,DU by BR at 2/11/2025 1510                      Point: Body mechanics (Done)       Learning Progress Summary            Patient Acceptance, E,D, VU,DU by BR at 2/11/2025 1510                      Point: Precautions (Done)       Learning Progress Summary            Patient Acceptance, E,D, VU,DU by BR at 2/11/2025 1510                                      User Key       Initials Effective Dates Name Provider Type Discipline    BR 02/01/22 -  Gayla Fink, PT Physical Therapist PT                  PT Recommendation and Plan  Planned Therapy Interventions (PT): balance training, bed mobility training, gait training, patient/family education, stair training, strengthening, transfer training  Outcome Evaluation: Dave Peguero presents as an 84 y.o. male with a CMH of CAD, PVD, htn, suspected bladder malignancy, BPH, T2DM, tobacco use who presented to [facility] on 2/10/2025 with hypertensive urgency. CXR (-). Pt A and O x 4. Pt resides with spouse, son and grandson in bi-level home with 7 AYANA and 13 interior steps. Pt drives and uses no AD for independence with community mobility. Pt reports having hospice at home. Pt hypertensive this date with BP post gait at 186/68 - Nursing aware. Pt transfers with CGA and ambulated 100 feet with CGA . Pt repoirts feeling weaker over recent months. Pt is not interested in  PT and reports he has good support at home from his family. PT will follow pt while he is hospital with PT recommendation of Home with Assist.     Time Calculation:         PT Charges       Row Name 02/11/25 1510             Time Calculation    Start Time 1116  -BR      Stop Time 1138  -BR      Time Calculation (min) 22 min  -BR      PT Received On 02/11/25  -BR      PT - Next Appointment 02/13/25  -BR      PT Goal Re-Cert Due Date 02/25/25  -BR         Time Calculation- PT    Total Timed Code Minutes- PT 0 minute(s)  -BR                 User Key  (r) = Recorded By, (t) = Taken By, (c) = Cosigned By      Initials Name Provider Type    BR Gayla Fink, PT Physical Therapist                  Therapy Charges for Today       Code Description Service Date Service Provider Modifiers Qty    43608670686 HC PT EVAL LOW COMPLEXITY 4 2/11/2025 Gayla Fink, PT GP 1            PT G-Codes  Outcome Measure Options: AM-PAC 6 Clicks Basic Mobility (PT)  AM-PAC 6 Clicks Score (PT): 21  PT Discharge Summary  Anticipated Discharge Disposition (PT): home with assist    Gayla Fink, PT  2/11/2025

## 2025-02-11 NOTE — NURSING NOTE
"Renal US ordered this shift. Patient refused renal US at this time. Pt states that urology told him that they \"weren't worried about his kidneys and were going to focus on bladder at this time.\" Pt states that he is refusing US \"strictly for monetary reasons.\" He does not want the bill and knows his\"time is coming, and dont want to leave my wife in debt: This RN educated and encouraged patient to have US due to GWEN.   Patient requesting to speak with nephrology and  regarding procedure and costs associated.   "

## 2025-02-11 NOTE — PROGRESS NOTES
RENAL/[facility]:    Consult received, chart reviewed, orders given.  Full note to follow.    Luis Romero M.D.  [facility]

## 2025-02-12 ENCOUNTER — APPOINTMENT (OUTPATIENT)
Dept: NUCLEAR MEDICINE | Facility: HOSPITAL | Age: 85
DRG: 305 | End: 2025-02-12
Payer: MEDICARE

## 2025-02-12 ENCOUNTER — TELEPHONE (OUTPATIENT)
Dept: CARDIOLOGY | Facility: CLINIC | Age: 85
End: 2025-02-12
Payer: MEDICARE

## 2025-02-12 LAB
ANION GAP SERPL CALCULATED.3IONS-SCNC: 16.6 MMOL/L (ref 5–15)
BASOPHILS # BLD AUTO: 0.03 10*3/MM3 (ref 0–0.2)
BASOPHILS NFR BLD AUTO: 0.3 % (ref 0–1.5)
BH CV REST NUCLEAR ISOTOPE DOSE: 11 MCI
BH CV STRESS BP STAGE 1: NORMAL
BH CV STRESS BP STAGE 2: NORMAL
BH CV STRESS COMMENTS STAGE 1: NORMAL
BH CV STRESS COMMENTS STAGE 2: NORMAL
BH CV STRESS DOSE REGADENOSON STAGE 1: 0.4
BH CV STRESS DURATION MIN STAGE 1: 0
BH CV STRESS DURATION MIN STAGE 2: 4
BH CV STRESS DURATION SEC STAGE 1: 10
BH CV STRESS DURATION SEC STAGE 2: 0
BH CV STRESS HR STAGE 1: 103
BH CV STRESS HR STAGE 2: 109
BH CV STRESS NUCLEAR ISOTOPE DOSE: 30.7 MCI
BH CV STRESS PROTOCOL 1: NORMAL
BH CV STRESS RECOVERY BP: NORMAL MMHG
BH CV STRESS RECOVERY HR: 103 BPM
BH CV STRESS STAGE 1: 1
BH CV STRESS STAGE 2: 2
BUN SERPL-MCNC: 32 MG/DL (ref 8–23)
BUN/CREAT SERPL: 16.3 (ref 7–25)
CALCIUM SPEC-SCNC: 9.4 MG/DL (ref 8.6–10.5)
CHLORIDE SERPL-SCNC: 103 MMOL/L (ref 98–107)
CO2 SERPL-SCNC: 17.4 MMOL/L (ref 22–29)
CREAT SERPL-MCNC: 1.96 MG/DL (ref 0.76–1.27)
CREAT UR-MCNC: 39.5 MG/DL
DEPRECATED RDW RBC AUTO: 41.8 FL (ref 37–54)
EGFRCR SERPLBLD CKD-EPI 2021: 32.7 ML/MIN/1.73
EOSINOPHIL # BLD AUTO: 0.04 10*3/MM3 (ref 0–0.4)
EOSINOPHIL NFR BLD AUTO: 0.4 % (ref 0.3–6.2)
ERYTHROCYTE [DISTWIDTH] IN BLOOD BY AUTOMATED COUNT: 13.2 % (ref 12.3–15.4)
GLUCOSE BLDC GLUCOMTR-MCNC: 150 MG/DL (ref 70–105)
GLUCOSE BLDC GLUCOMTR-MCNC: 180 MG/DL (ref 70–105)
GLUCOSE BLDC GLUCOMTR-MCNC: 227 MG/DL (ref 70–105)
GLUCOSE BLDC GLUCOMTR-MCNC: 280 MG/DL (ref 70–105)
GLUCOSE SERPL-MCNC: 142 MG/DL (ref 65–99)
HCT VFR BLD AUTO: 33.6 % (ref 37.5–51)
HGB BLD-MCNC: 11.1 G/DL (ref 13–17.7)
IMM GRANULOCYTES # BLD AUTO: 0.04 10*3/MM3 (ref 0–0.05)
IMM GRANULOCYTES NFR BLD AUTO: 0.4 % (ref 0–0.5)
LYMPHOCYTES # BLD AUTO: 1.27 10*3/MM3 (ref 0.7–3.1)
LYMPHOCYTES NFR BLD AUTO: 11.8 % (ref 19.6–45.3)
MAGNESIUM SERPL-MCNC: 2.2 MG/DL (ref 1.6–2.4)
MAXIMAL PREDICTED HEART RATE: 134 BPM
MCH RBC QN AUTO: 29.1 PG (ref 26.6–33)
MCHC RBC AUTO-ENTMCNC: 33 G/DL (ref 31.5–35.7)
MCV RBC AUTO: 88 FL (ref 79–97)
MONOCYTES # BLD AUTO: 0.69 10*3/MM3 (ref 0.1–0.9)
MONOCYTES NFR BLD AUTO: 6.4 % (ref 5–12)
NEUTROPHILS NFR BLD AUTO: 8.68 10*3/MM3 (ref 1.7–7)
NEUTROPHILS NFR BLD AUTO: 80.7 % (ref 42.7–76)
NRBC BLD AUTO-RTO: 0 /100 WBC (ref 0–0.2)
PERCENT MAX PREDICTED HR: 82.84 %
PHOSPHATE SERPL-MCNC: 3.8 MG/DL (ref 2.5–4.5)
PLATELET # BLD AUTO: 207 10*3/MM3 (ref 140–450)
PMV BLD AUTO: 11 FL (ref 6–12)
POTASSIUM SERPL-SCNC: 4.4 MMOL/L (ref 3.5–5.2)
PROT ?TM UR-MCNC: 235.8 MG/DL
PROT/CREAT UR: 5969.6 MG/G CREA (ref 0–200)
RBC # BLD AUTO: 3.82 10*6/MM3 (ref 4.14–5.8)
SODIUM SERPL-SCNC: 137 MMOL/L (ref 136–145)
SPECT HRT GATED+EF W RNC IV: 50 %
STRESS BASELINE BP: NORMAL MMHG
STRESS BASELINE HR: 103 BPM
STRESS PERCENT HR: 97 %
STRESS POST PEAK BP: NORMAL MMHG
STRESS POST PEAK HR: 111 BPM
STRESS TARGET HR: 114 BPM
WBC NRBC COR # BLD AUTO: 10.75 10*3/MM3 (ref 3.4–10.8)

## 2025-02-12 PROCEDURE — 25010000002 HEPARIN (PORCINE) PER 1000 UNITS: Performed by: STUDENT IN AN ORGANIZED HEALTH CARE EDUCATION/TRAINING PROGRAM

## 2025-02-12 PROCEDURE — 85025 COMPLETE CBC W/AUTO DIFF WBC: CPT | Performed by: STUDENT IN AN ORGANIZED HEALTH CARE EDUCATION/TRAINING PROGRAM

## 2025-02-12 PROCEDURE — 78452 HT MUSCLE IMAGE SPECT MULT: CPT | Performed by: INTERNAL MEDICINE

## 2025-02-12 PROCEDURE — 80048 BASIC METABOLIC PNL TOTAL CA: CPT | Performed by: STUDENT IN AN ORGANIZED HEALTH CARE EDUCATION/TRAINING PROGRAM

## 2025-02-12 PROCEDURE — 82948 REAGENT STRIP/BLOOD GLUCOSE: CPT | Performed by: STUDENT IN AN ORGANIZED HEALTH CARE EDUCATION/TRAINING PROGRAM

## 2025-02-12 PROCEDURE — 94761 N-INVAS EAR/PLS OXIMETRY MLT: CPT

## 2025-02-12 PROCEDURE — 25010000002 LABETALOL 5 MG/ML SOLUTION: Performed by: INTERNAL MEDICINE

## 2025-02-12 PROCEDURE — 25010000002 ONDANSETRON PER 1 MG: Performed by: INTERNAL MEDICINE

## 2025-02-12 PROCEDURE — 25010000002 REGADENOSON 0.4 MG/5ML SOLUTION: Performed by: HOSPITALIST

## 2025-02-12 PROCEDURE — 82948 REAGENT STRIP/BLOOD GLUCOSE: CPT

## 2025-02-12 PROCEDURE — 78452 HT MUSCLE IMAGE SPECT MULT: CPT

## 2025-02-12 PROCEDURE — 25010000002 HYDRALAZINE PER 20 MG: Performed by: INTERNAL MEDICINE

## 2025-02-12 PROCEDURE — 94799 UNLISTED PULMONARY SVC/PX: CPT

## 2025-02-12 PROCEDURE — 93017 CV STRESS TEST TRACING ONLY: CPT

## 2025-02-12 PROCEDURE — 63710000001 INSULIN LISPRO (HUMAN) PER 5 UNITS: Performed by: STUDENT IN AN ORGANIZED HEALTH CARE EDUCATION/TRAINING PROGRAM

## 2025-02-12 PROCEDURE — 99232 SBSQ HOSP IP/OBS MODERATE 35: CPT | Performed by: INTERNAL MEDICINE

## 2025-02-12 PROCEDURE — 83735 ASSAY OF MAGNESIUM: CPT | Performed by: STUDENT IN AN ORGANIZED HEALTH CARE EDUCATION/TRAINING PROGRAM

## 2025-02-12 PROCEDURE — 93018 CV STRESS TEST I&R ONLY: CPT | Performed by: INTERNAL MEDICINE

## 2025-02-12 PROCEDURE — 34310000005 TECHNETIUM TETROFOSMIN KIT: Performed by: HOSPITALIST

## 2025-02-12 PROCEDURE — 84100 ASSAY OF PHOSPHORUS: CPT | Performed by: STUDENT IN AN ORGANIZED HEALTH CARE EDUCATION/TRAINING PROGRAM

## 2025-02-12 PROCEDURE — 97165 OT EVAL LOW COMPLEX 30 MIN: CPT

## 2025-02-12 PROCEDURE — A9502 TC99M TETROFOSMIN: HCPCS | Performed by: HOSPITALIST

## 2025-02-12 PROCEDURE — 93016 CV STRESS TEST SUPVJ ONLY: CPT | Performed by: INTERNAL MEDICINE

## 2025-02-12 RX ORDER — ISOSORBIDE MONONITRATE 30 MG/1
30 TABLET, EXTENDED RELEASE ORAL
Status: DISCONTINUED | OUTPATIENT
Start: 2025-02-12 | End: 2025-02-13 | Stop reason: HOSPADM

## 2025-02-12 RX ORDER — IPRATROPIUM BROMIDE AND ALBUTEROL SULFATE 2.5; .5 MG/3ML; MG/3ML
3 SOLUTION RESPIRATORY (INHALATION)
Status: DISCONTINUED | OUTPATIENT
Start: 2025-02-12 | End: 2025-02-13 | Stop reason: HOSPADM

## 2025-02-12 RX ORDER — HYDRALAZINE HYDROCHLORIDE 25 MG/1
50 TABLET, FILM COATED ORAL EVERY 8 HOURS SCHEDULED
Status: DISCONTINUED | OUTPATIENT
Start: 2025-02-12 | End: 2025-02-13 | Stop reason: HOSPADM

## 2025-02-12 RX ORDER — MINOXIDIL 2.5 MG/1
2.5 TABLET ORAL
Status: DISCONTINUED | OUTPATIENT
Start: 2025-02-12 | End: 2025-02-13 | Stop reason: HOSPADM

## 2025-02-12 RX ORDER — LABETALOL HYDROCHLORIDE 5 MG/ML
10 INJECTION, SOLUTION INTRAVENOUS ONCE
Status: COMPLETED | OUTPATIENT
Start: 2025-02-12 | End: 2025-02-12

## 2025-02-12 RX ORDER — REGADENOSON 0.08 MG/ML
0.4 INJECTION, SOLUTION INTRAVENOUS
Status: COMPLETED | OUTPATIENT
Start: 2025-02-12 | End: 2025-02-12

## 2025-02-12 RX ADMIN — HEPARIN SODIUM 5000 UNITS: 5000 INJECTION INTRAVENOUS; SUBCUTANEOUS at 05:19

## 2025-02-12 RX ADMIN — Medication 10 ML: at 21:49

## 2025-02-12 RX ADMIN — HYDRALAZINE HYDROCHLORIDE 50 MG: 25 TABLET ORAL at 21:47

## 2025-02-12 RX ADMIN — HYDRALAZINE HYDROCHLORIDE 10 MG: 20 INJECTION INTRAMUSCULAR; INTRAVENOUS at 04:36

## 2025-02-12 RX ADMIN — Medication 10 ML: at 04:38

## 2025-02-12 RX ADMIN — INSULIN LISPRO 3 UNITS: 100 INJECTION, SOLUTION INTRAVENOUS; SUBCUTANEOUS at 21:44

## 2025-02-12 RX ADMIN — Medication 10 ML: at 06:18

## 2025-02-12 RX ADMIN — AMLODIPINE BESYLATE 5 MG: 5 TABLET ORAL at 21:46

## 2025-02-12 RX ADMIN — ACETAMINOPHEN 650 MG: 325 TABLET, FILM COATED ORAL at 21:43

## 2025-02-12 RX ADMIN — ATORVASTATIN CALCIUM 20 MG: 20 TABLET, FILM COATED ORAL at 17:12

## 2025-02-12 RX ADMIN — CARVEDILOL 25 MG: 25 TABLET, FILM COATED ORAL at 17:12

## 2025-02-12 RX ADMIN — ONDANSETRON 4 MG: 2 INJECTION INTRAMUSCULAR; INTRAVENOUS at 06:18

## 2025-02-12 RX ADMIN — CARVEDILOL 25 MG: 25 TABLET, FILM COATED ORAL at 10:04

## 2025-02-12 RX ADMIN — INSULIN LISPRO 4 UNITS: 100 INJECTION, SOLUTION INTRAVENOUS; SUBCUTANEOUS at 13:41

## 2025-02-12 RX ADMIN — REGADENOSON 0.4 MG: 0.08 INJECTION, SOLUTION INTRAVENOUS at 09:07

## 2025-02-12 RX ADMIN — SENNOSIDES AND DOCUSATE SODIUM 2 TABLET: 50; 8.6 TABLET ORAL at 10:06

## 2025-02-12 RX ADMIN — PANTOPRAZOLE SODIUM 40 MG: 40 TABLET, DELAYED RELEASE ORAL at 05:19

## 2025-02-12 RX ADMIN — HEPARIN SODIUM 5000 UNITS: 5000 INJECTION INTRAVENOUS; SUBCUTANEOUS at 13:40

## 2025-02-12 RX ADMIN — Medication 10 ML: at 02:15

## 2025-02-12 RX ADMIN — TETROFOSMIN 1 DOSE: 1.38 INJECTION, POWDER, LYOPHILIZED, FOR SOLUTION INTRAVENOUS at 09:06

## 2025-02-12 RX ADMIN — ISOSORBIDE MONONITRATE 30 MG: 30 TABLET, EXTENDED RELEASE ORAL at 10:05

## 2025-02-12 RX ADMIN — TETROFOSMIN 1 DOSE: 1.38 INJECTION, POWDER, LYOPHILIZED, FOR SOLUTION INTRAVENOUS at 07:02

## 2025-02-12 RX ADMIN — HEPARIN SODIUM 5000 UNITS: 5000 INJECTION INTRAVENOUS; SUBCUTANEOUS at 21:47

## 2025-02-12 RX ADMIN — MINOXIDIL 2.5 MG: 2.5 TABLET ORAL at 16:32

## 2025-02-12 RX ADMIN — SENNOSIDES AND DOCUSATE SODIUM 1 TABLET: 50; 8.6 TABLET ORAL at 21:45

## 2025-02-12 RX ADMIN — POLYETHYLENE GLYCOL 3350 17 G: 17 POWDER, FOR SOLUTION ORAL at 10:00

## 2025-02-12 RX ADMIN — LABETALOL HYDROCHLORIDE 10 MG: 5 INJECTION, SOLUTION INTRAVENOUS at 02:15

## 2025-02-12 RX ADMIN — AMLODIPINE BESYLATE 5 MG: 5 TABLET ORAL at 10:05

## 2025-02-12 NOTE — PLAN OF CARE
Goal Outcome Evaluation:  Plan of Care Reviewed With: patient        Progress: no change  Outcome Evaluation: pt has had high BP pressure off and on, on call dr was called and extra BP meds were given and prn BP med also added, pt has not rested much nervous about test today and read echo was to be checked but see no order for ECHO and last one I can see is May 2019, pt has been NPO since midnight for stess test today

## 2025-02-12 NOTE — THERAPY EVALUATION
Patient Name: Dave Peguero  : 1940    MRN: [id number]                              Today's Date: 2025       Admit Date: 2/10/2025    Visit Dx:     ICD-10-CM ICD-9-CM   1. 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     Atherosclerosis of native arteries of extremities with intermittent claudication, left leg     Cancer     breast cancer hx    Carotid artery stenosis     Cerebral vascular disease     CHF (congestive heart failure)     Coronary artery disease     Diabetes mellitus     type 1    Hyperlipidemia     Hypertension     Kidney stone     Myocardial infarction     x 3     Past Surgical History:   Procedure Laterality Date    CARDIAC CATHETERIZATION      CORONARY ARTERY BYPASS GRAFT      TRIPLE    DENTAL PROCEDURE      all teeth have been pulled    FEMORAL ARTERY STENT Bilateral     JOINT REPLACEMENT Right     knee    PROSTATE SURGERY        General Information       Row Name 25 1119          OT Time and Intention    Subjective Information no complaints  -SP     Document Type evaluation  -SP     Mode of Treatment occupational therapy  -SP     Patient Effort excellent  -SP       Row Name 25 1119          General Information    Patient Profile Reviewed yes  -SP     Prior Level of Function independent:;ADL's;driving;all household mobility;home management  -SP     Existing Precautions/Restrictions fall  -SP     Barriers to Rehab medically complex  -SP       Row Name 25 1119          Living Environment    People in Home child(savannah), adult;grandchild(savannah);spouse  -SP       Row Name 25 1119          Home Main Entrance    Number of Stairs, Main Entrance seven  -SP     Stair Railings, Main Entrance railings safe and in  good condition  -SP       Row Name 02/12/25 1119          Stairs Within Home, Primary    Stairs, Within Home, Primary bi-level home  -SP     Number of Stairs, Within Home, Primary other (see comments)  flight of steps  -SP       Row Name 02/12/25 1119          Cognition    Orientation Status (Cognition) oriented x 4  -SP       Row Name 02/12/25 1119          Safety Issues/Impairments Affecting Functional Mobility    Impairments Affecting Function (Mobility) endurance/activity tolerance;strength  -SP               User Key  (r) = Recorded By, (t) = Taken By, (c) = Cosigned By      Initials Name Provider Type    SP Shaheen Crocker OT Occupational Therapist                     Mobility/ADL's       Row Name 02/12/25 1121          Bed Mobility    Bed Mobility bed mobility (all) activities  -SP     All Activities, [place] (Bed Mobility) not tested  -SP     Comment, (Bed Mobility) Pt coming back from transport, observed SBA to t/f from transport chair to bed. Pt sitting EOB  -SP       Row Name 02/12/25 1121          Transfers    Transfers sit-stand transfer  -SP       Row Name 02/12/25 1121          Sit-Stand Transfer    Sit-Stand [place] (Transfers) standby assist  -SP       Row Name 02/12/25 1121          Functional Mobility    Functional Mobility- Ind. Level contact guard assist  -SP     Patient was able to Ambulate yes  -SP       Row Name 02/12/25 1121          Activities of Daily Living    BADL Assessment/Intervention lower body dressing  -SP       Row Name 02/12/25 1121          Lower Body Dressing Assessment/Training    [place] Level (Lower Body Dressing) don;doff;socks;independent  -SP     Position (Lower Body Dressing) edge of bed sitting  -SP               User Key  (r) = Recorded By, (t) = Taken By, (c) = Cosigned By      Initials Name Provider Type    SP Shaheen Crocker OT Occupational Therapist                   Obj/Interventions       Row Name 02/12/25 1122          Sensory Assessment  "(Somatosensory)    Sensory Assessment (Somatosensory) UE sensation intact  -SP       Row Name 02/12/25 1122          Vision Assessment/Intervention    Vision Assessment Comment Pt reports he is \"supposed\" to wear glasses 24/7 though unable to see with them currently, reports recent changes in vision, has not went to eye doctor  -SP       Row Name 02/12/25 1122          Range of Motion Comprehensive    General Range of Motion bilateral upper extremity ROM WFL  -SP       Row Name 02/12/25 1122          Strength Comprehensive (MMT)    Comment, General Manual Muscle Testing (MMT) Assessment BUE grossly 4-/5  -SP       Row Name 02/12/25 1122          Balance    Balance Assessment sitting dynamic balance;sit to stand dynamic balance;standing dynamic balance  -SP     Dynamic Sitting Balance independent  -SP     Position, Sitting Balance unsupported;sitting edge of bed  -SP     Sit to Stand Dynamic Balance standby assist  -SP     Dynamic Standing Balance contact guard  -SP               User Key  (r) = Recorded By, (t) = Taken By, (c) = Cosigned By      Initials Name Provider Type    SP Shaheen Crocker OT Occupational Therapist                   Goals/Plan    No documentation.                  Clinical Impression       Row Name 02/12/25 1123          Pain Assessment    Pretreatment Pain Rating 0/10 - no pain  -SP     Posttreatment Pain Rating 0/10 - no pain  -SP       Row Name 02/12/25 1123          Plan of Care Review    Plan of Care Reviewed With patient;daughter  -SP     Outcome Evaluation Pt is a 83 y/o male admitted to [facility] on 2/10/25 with c/o HTN urgency. He was seen with outpatient cardiology Dr. Milton for pre-operative clearance for bladder mass/prostate resection. In the office noted to have significantly elevated blood pressures and sent in to ED. PMHx significant for CAD, PVD, htn, suspected bladder malignancy, BPH, T2DM, and tobacco use. At baseline, pt resides with spouse, adult son and grandson in a bi-level " home with x7 AYANA with HR. Pt reports he is IND with I/ADLs, actively driving, and does not utilzie any DME, however owns a cane and walker. Pt with walk-in shower with shower chair for bathing. Upon assessment, pt A&O x4 with no reports or pain. Pt demos functional ROM and fair ROM. Pt IND doffed/don socks and completes all functional tranfers and ambulation with CGA for safety. Based on assessment, no OT concerns identified at this time and pt exhibits safe functional transfers and ambulates household distances with no LOB or SOA. OT recommending return home with family assist as needed. OT will sign-off and complete orders, please re-consult if pt has a change in status.  -SP       Row Name 02/12/25 1123          Therapy Assessment/Plan (OT)    Criteria for Skilled Therapeutic Interventions Met (OT) no  -SP     Therapy Frequency (OT) evaluation only  -SP       Row Name 02/12/25 1123          Therapy Plan Review/Discharge Plan (OT)    Anticipated Discharge Disposition (OT) home with assist  -SP       Row Name 02/12/25 1123          Vital Signs    Pre Systolic BP Rehab 165  -SP     Pre Treatment Diastolic BP 81  -SP     O2 Delivery Pre Treatment room air  -SP     O2 Delivery Intra Treatment room air  -SP     O2 Delivery Post Treatment room air  -SP     Pre Patient Position Sitting  -SP     Intra Patient Position Standing  -SP     Post Patient Position Sitting  -SP       Row Name 02/12/25 1123          Positioning and Restraints    Pre-Treatment Position in bed  -SP     Post Treatment Position bed  -SP     In Bed sitting EOB;with family/caregiver;with nsg  -SP               User Key  (r) = Recorded By, (t) = Taken By, (c) = Cosigned By      Initials Name Provider Type    Shaheen Gutierrez, OT Occupational Therapist                   Outcome Measures       Row Name 02/12/25 1125          How much help from another is currently needed...    Putting on and taking off regular lower body clothing? 4  -SP     Bathing  (including washing, rinsing, and drying) 4  -SP     Toileting (which includes using toilet bed pan or urinal) 4  -SP     Putting on and taking off regular upper body clothing 4  -SP     Taking care of personal grooming (such as brushing teeth) 4  -SP     Eating meals 4  -SP     AM-PAC 6 Clicks Score (OT) 24  -SP       Row Name 02/12/25 0800          How much help from another person do you currently need...    Turning from your back to your side while in flat bed without using bedrails? 4  -RM     Moving from lying on back to sitting on the side of a flat bed without bedrails? 4  -RM     Moving to and from a bed to a chair (including a wheelchair)? 3  -RM     Standing up from a chair using your arms (e.g., wheelchair, bedside chair)? 4  -RM     Climbing 3-5 steps with a railing? 3  -RM     To walk in hospital room? 3  -RM     AM-PAC 6 Clicks Score (PT) 21  -RM     Highest Level of Mobility Goal 6 --> Walk 10 steps or more  -RM       Row Name 02/12/25 1125          Functional Assessment    Outcome Measure Options AM-PAC 6 Clicks Daily Activity (OT)  -SP               User Key  (r) = Recorded By, (t) = Taken By, (c) = Cosigned By      Initials Name Provider Type    Tawny Brown LPN Licensed Nurse    Shaheen Gutierrez OT Occupational Therapist                    Occupational Therapy Education       Title: PT OT SLP Therapies (In Progress)       Topic: Occupational Therapy (In Progress)       Point: ADL training (Done)       Description:   Instruct learner(s) on proper safety adaptation and remediation techniques during self care or transfers.   Instruct in proper use of assistive devices.                  Learning Progress Summary            Patient Acceptance, E,TB, VU by SP at 2/12/2025 1125                      Point: Home exercise program (Not Started)       Description:   Instruct learner(s) on appropriate technique for monitoring, assisting and/or progressing therapeutic exercises/activities.                   Learner Progress:  Not documented in this visit.              Point: Precautions (Done)       Description:   Instruct learner(s) on prescribed precautions during self-care and functional transfers.                  Learning Progress Summary            Patient Acceptance, E,TB, VU by SP at 2/12/2025 1125                      Point: Body mechanics (Done)       Description:   Instruct learner(s) on proper positioning and spine alignment during self-care, functional mobility activities and/or exercises.                  Learning Progress Summary            Patient Acceptance, E,TB, VU by SP at 2/12/2025 1125                                      User Key       Initials Effective Dates Name Provider Type Discipline    SP 11/15/23 -  Shaheen Crocker, JENNY Occupational Therapist OT                  OT Recommendation and Plan  Therapy Frequency (OT): evaluation only  Plan of Care Review  Plan of Care Reviewed With: patient, daughter  Outcome Evaluation: Pt is a 83 y/o male admitted to [facility] on 2/10/25 with c/o HTN urgency. He was seen with outpatient cardiology Dr. Milton for pre-operative clearance for bladder mass/prostate resection. In the office noted to have significantly elevated blood pressures and sent in to ED. PMHx significant for CAD, PVD, htn, suspected bladder malignancy, BPH, T2DM, and tobacco use. At baseline, pt resides with spouse, adult son and grandson in a bi-level home with x7 AYANA with HR. Pt reports he is IND with I/ADLs, actively driving, and does not utilzie any DME, however owns a cane and walker. Pt with walk-in shower with shower chair for bathing. Upon assessment, pt A&O x4 with no reports or pain. Pt demos functional ROM and fair ROM. Pt IND doffed/don socks and completes all functional tranfers and ambulation with CGA for safety. Based on assessment, no OT concerns identified at this time and pt exhibits safe functional transfers and ambulates household distances with no LOB or SOA. OT  recommending return home with family assist as needed. OT will sign-off and complete orders, please re-consult if pt has a change in status.     Time Calculation:         Time Calculation- OT       Row Name 02/12/25 1125             Time Calculation- OT    OT Start Time 0948  -SP      OT Stop Time 1010  -SP      OT Time Calculation (min) 22 min  -SP      OT Received On 02/12/25  -SP                User Key  (r) = Recorded By, (t) = Taken By, (c) = Cosigned By      Initials Name Provider Type    SP Shaheen Crocker OT Occupational Therapist                  Therapy Charges for Today       Code Description Service Date Service Provider Modifiers Qty    [id number] HC OT EVAL LOW COMPLEXITY 4 2/12/2025 Shaheen Crocker OT GO 1                 Shaheen Crocker OT  2/12/2025

## 2025-02-12 NOTE — PROGRESS NOTES
Urology Progress Note    Patient Identification:  Name:  Dave Peguero  Age:  84 y.o.  Sex:  male  :  1940  MRN:  [id number]    Subjective:   Patient is passing some small clots.  He is able to void and empty.  He is getting his cardiac workup during this admission    Problem List:    Hypertensive urgency    Past Medical History:  Past Medical History:   Diagnosis Date    Arthritis     Atherosclerosis of autologous vein bypass graft of right lower extremity with intermittent claudication     Atherosclerosis of native arteries of extremities with intermittent claudication, left leg     Cancer     breast cancer hx    Carotid artery stenosis     Cerebral vascular disease     CHF (congestive heart failure)     Coronary artery disease     Diabetes mellitus     type 1    Hyperlipidemia     Hypertension     Kidney stone     Myocardial infarction     x 3     Past Surgical History:  Past Surgical History:   Procedure Laterality Date    CARDIAC CATHETERIZATION      CORONARY ARTERY BYPASS GRAFT      TRIPLE    DENTAL PROCEDURE      all teeth have been pulled    FEMORAL ARTERY STENT Bilateral     JOINT REPLACEMENT Right     knee    PROSTATE SURGERY       Home Meds:  Medications Prior to Admission   Medication Sig Dispense Refill Last Dose/Taking    amLODIPine (NORVASC) 5 MG tablet Take 1 tablet by mouth 2 (Two) Times a Day.   2/10/2025    aspirin 81 MG EC tablet TAKE 1 TABLET BY MOUTH DAILY 90 tablet 0 2/10/2025    atorvastatin (LIPITOR) 20 MG tablet Take 1 tablet by mouth Every Evening.   2/10/2025    calcium carbonate (OS-RUTH) 600 MG tablet Take 1 tablet by mouth 2 (Two) Times a Day With Meals.   2/10/2025    cetirizine (zyrTEC) 10 MG tablet Take 1 tablet by mouth Daily. 90 tablet 1 2/10/2025    clopidogrel (PLAVIX) 75 MG tablet Take 1 tablet by mouth Daily.   2/10/2025    hydrALAZINE (APRESOLINE) 10 MG tablet Take 1 tablet by mouth 2 (Two) Times a Day.   2/10/2025    metFORMIN (GLUCOPHAGE) 500 MG tablet Take 1  tablet by mouth 3 (Three) Times a Day.   2/10/2025    metoprolol succinate XL (TOPROL-XL) 100 MG 24 hr tablet Take 1 tablet by mouth 3 times a day.   2/10/2025    ondansetron (ZOFRAN) 4 MG tablet Take 1 tablet by mouth 2 (Two) Times a Day.   2/10/2025    pantoprazole (PROTONIX) 40 MG EC tablet Take 1 tablet by mouth Daily.   Taking    sucralfate (CARAFATE) 1 g tablet Take 1 tablet by mouth Every Other Day.   2/10/2025     Current Meds:    Current Facility-Administered Medications:     acetaminophen (TYLENOL) tablet 650 mg, 650 mg, Oral, Q4H PRN, 650 mg at 02/10/25 2117 **OR** acetaminophen (TYLENOL) 160 MG/5ML oral solution 650 mg, 650 mg, Oral, Q4H PRN **OR** acetaminophen (TYLENOL) suppository 650 mg, 650 mg, Rectal, Q4H PRN, Fidel Arias MD    amLODIPine (NORVASC) tablet 5 mg, 5 mg, Oral, BID, Fidel Arias MD, 5 mg at 02/11/25 2124    [Held by provider] aspirin EC tablet 81 mg, 81 mg, Oral, Daily, Fidel Arias MD, 81 mg at 02/10/25 1738    atorvastatin (LIPITOR) tablet 20 mg, 20 mg, Oral, Q PM, Fidel Arias MD, 20 mg at 02/11/25 1701    sennosides-docusate (PERICOLACE) 8.6-50 MG per tablet 2 tablet, 2 tablet, Oral, BID PRN **AND** polyethylene glycol (MIRALAX) packet 17 g, 17 g, Oral, Daily **AND** bisacodyl (DULCOLAX) EC tablet 5 mg, 5 mg, Oral, Daily PRN **AND** bisacodyl (DULCOLAX) suppository 10 mg, 10 mg, Rectal, Daily PRN, Brammell, Timothy Duane, MD    Calcium Replacement - Follow Nurse / BPA Driven Protocol, , Not Applicable, PRN, Fidel Arias MD    carvedilol (COREG) tablet 25 mg, 25 mg, Oral, BID With Meals, Huong Viramontes APRN, 25 mg at 02/11/25 1701    [Held by provider] clopidogrel (PLAVIX) tablet 75 mg, 75 mg, Oral, Daily, Fidel Arias MD    dextrose (D50W) (25 g/50 mL) IV injection 25 g, 25 g, Intravenous, Q15 Min PRN, Fidel Arias MD    dextrose (GLUTOSE) oral gel 15 g, 15 g, Oral, Q15 Min PRN, Fidel Arias MD    glucagon (GLUCAGEN) injection 1 mg, 1 mg,  Intramuscular, Q15 Min PRN, Fidel Arias MD    heparin (porcine) 5000 UNIT/ML injection 5,000 Units, 5,000 Units, Subcutaneous, Q8H, Fidel Arias MD, 5,000 Units at 02/12/25 0519    hydrALAZINE (APRESOLINE) injection 10 mg, 10 mg, Intravenous, Q4H PRN, Laura Burnett MD, 10 mg at 02/12/25 0436    hydrALAZINE (APRESOLINE) tablet 25 mg, 25 mg, Oral, 4x Daily, Luis Romero MD, 25 mg at 02/11/25 1937    insulin lispro (HUMALOG/ADMELOG) injection 2-7 Units, 2-7 Units, Subcutaneous, 4x Daily AC & at Bedtime, Fidel Arias MD, 4 Units at 02/11/25 2124    Magnesium Low Dose Replacement - Follow Nurse / BPA Driven Protocol, , Not Applicable, PRN, Fidel Arias MD    nicotine (NICODERM CQ) 21 MG/24HR patch 1 patch, 1 patch, Transdermal, Daily PRN, Fidel Arias MD, 1 patch at 02/11/25 2136    nitroglycerin (NITROSTAT) SL tablet 0.4 mg, 0.4 mg, Sublingual, Q5 Min PRN, Fidel Arias MD    ondansetron (ZOFRAN) injection 4 mg, 4 mg, Intravenous, Q6H PRN, Laura Burnett MD, 4 mg at 02/12/25 0618    pantoprazole (PROTONIX) EC tablet 40 mg, 40 mg, Oral, Q AM, Fidel Arias MD, 40 mg at 02/12/25 0519    Phosphorus Replacement - Follow Nurse / BPA Driven Protocol, , Not Applicable, Hugo BUNN Nicholas T, MD    Potassium Replacement - Follow Nurse / BPA Driven Protocol, , Not Applicable, Hugo BUNN Nicholas T, MD    sennosides-docusate (PERICOLACE) 8.6-50 MG per tablet 1 tablet, 1 tablet, Oral, BID, Brammell, Timothy Duane, MD, 1 tablet at 02/11/25 2124    [COMPLETED] Insert Peripheral IV, , , Once **AND** sodium chloride 0.9 % flush 10 mL, 10 mL, Intravenous, PRN, Starr Romero, APRN, 10 mL at 02/12/25 0618    sodium chloride 0.9 % infusion, 100 mL/hr, Intravenous, Continuous, Luis Romero MD, Last Rate: 100 mL/hr at 02/11/25 2135, 100 mL/hr at 02/11/25 2135  Allergies:  Benadryl [diphenhydramine]    Review of Systems:  Negative 12-point system review except  "for what is in the HPI    Objective:  tMax 24 hours:  Temp (24hrs), Av.1 °F (36.7 °C), Min:97.9 °F (36.6 °C), Max:98.3 °F (36.8 °C)    Vital Sign Ranges:  Temp:  [97.9 °F (36.6 °C)-98.3 °F (36.8 °C)] 97.9 °F (36.6 °C)  Heart Rate:  [] 101  Resp:  [12-22] 16  BP: (155-225)/() 156/77  Intake and Output Last 3 Shifts:  I/O last 3 completed shifts:  In:  [P.O.:1080; I.V.:1000]  Out: 4750 [Urine:4750]    Exam:  /77 (BP Location: Right arm, Patient Position: Lying)   Pulse 101   Temp 97.9 °F (36.6 °C) (Oral)   Resp 16   Ht 175.3 cm (69\")   Wt 89.3 kg (196 lb 13.9 oz)   SpO2 96%   BMI 29.07 kg/m²    General Appearance:    Alert, cooperative, no acute distress, general       appearance is normal   Head:    Normocephalic, without obvious abnormality, atraumatic   Eyes:            Pupils/irises normal. Exterior inspection of conjunctivae       and lids normal.   Ears:    Normal external inspection   Nose:   Exterior inspection of nose is normal   Throat:   Lips, mucosa, and tongue normal   Neck:   No adenopathy, supple, symmetrical, trachea midline   Back:     No CVA tenderness   Lungs:     Respirations unlabored; normal effort, no audible     abnormality   CV:   Regular rhythm and normal rate, no edema   Abdomen:     No hernia, examination of the abdomen is normal with     no masses, tenderness, or distension    Male : Normal   Musculoskeletal:   Inspection of the nails and digits reveals no abnormalities   Skin:   Inspection normal, palpation normal   Neurologic/Psych:   Orientation normal; Mood/Affect normal     Data Review:  All labs (24hrs):    Lab Results (last 24 hours)       Procedure Component Value Units Date/Time    Basic Metabolic Panel [[id number]]  (Abnormal) Collected: 25 0445    Specimen: Blood from Arm, Right Updated: 25 0636     Glucose 142 mg/dL      BUN 32 mg/dL      Creatinine 1.96 mg/dL      Sodium 137 mmol/L      Potassium 4.4 mmol/L      Comment: Specimen " hemolyzed.  Result may be falsely elevated.        Chloride 103 mmol/L      CO2 17.4 mmol/L      Calcium 9.4 mg/dL      BUN/Creatinine Ratio 16.3     Anion Gap 16.6 mmol/L      eGFR 32.7 mL/min/1.73     Narrative:      GFR Categories in Chronic Kidney Disease (CKD)      GFR Category          GFR (mL/min/1.73)    Interpretation  G1                     90 or greater         Normal or high (1)  G2                      60-89                Mild decrease (1)  G3a                   45-59                Mild to moderate decrease  G3b                   30-44                Moderate to severe decrease  G4                    15-29                Severe decrease  G5                    14 or less           Kidney failure          (1)In the absence of evidence of kidney disease, neither GFR category G1 or G2 fulfill the criteria for CKD.    eGFR calculation 2021 CKD-EPI creatinine equation, which does not include race as a factor    Magnesium [[id number]]  (Normal) Collected: 02/12/25 0445    Specimen: Blood from Arm, Right Updated: 02/12/25 0636     Magnesium 2.2 mg/dL     Phosphorus [[id number]]  (Normal) Collected: 02/12/25 0445    Specimen: Blood from Arm, Right Updated: 02/12/25 0635     Phosphorus 3.8 mg/dL     CBC Auto Differential [[id number]]  (Abnormal) Collected: 02/12/25 0445    Specimen: Blood from Arm, Right Updated: 02/12/25 0618     WBC 10.75 10*3/mm3      RBC 3.82 10*6/mm3      Hemoglobin 11.1 g/dL      Hematocrit 33.6 %      MCV 88.0 fL      MCH 29.1 pg      MCHC 33.0 g/dL      RDW 13.2 %      RDW-SD 41.8 fl      MPV 11.0 fL      Platelets 207 10*3/mm3      Neutrophil % 80.7 %      Lymphocyte % 11.8 %      Monocyte % 6.4 %      Eosinophil % 0.4 %      Basophil % 0.3 %      Immature Grans % 0.4 %      Neutrophils, Absolute 8.68 10*3/mm3      Lymphocytes, Absolute 1.27 10*3/mm3      Monocytes, Absolute 0.69 10*3/mm3      Eosinophils, Absolute 0.04 10*3/mm3      Basophils, Absolute 0.03 10*3/mm3      Immature Grans,  Absolute 0.04 10*3/mm3      nRBC 0.0 /100 WBC     POC Glucose Once [[id number]]  (Abnormal) Collected: 02/12/25 0520    Specimen: Blood Updated: 02/12/25 0521     Glucose 180 mg/dL      Comment: Serial Number: [id number]:  [id number]       Protein / Creatinine Ratio, Urine - Urine, Clean Catch [[id number]]  (Abnormal) Collected: 02/11/25 1822    Specimen: Urine, Clean Catch Updated: 02/12/25 0114     Protein/Creatinine Ratio, Urine 5,969.6 mg/G Crea      Creatinine, Urine 39.5 mg/dL      Total Protein, Urine 235.8 mg/dL     POC Glucose Once [[id number]]  (Abnormal) Collected: 02/11/25 2023    Specimen: Blood Updated: 02/11/25 2025     Glucose 261 mg/dL      Comment: Serial Number: [id number]:  [id number]       POC Glucose Once [[id number]]  (Abnormal) Collected: 02/11/25 1550    Specimen: Blood Updated: 02/11/25 1552     Glucose 185 mg/dL      Comment: Serial Number: [id number]:  [id number]       POC Glucose 4x Daily Before Meals & at Bedtime [[id number]]  (Abnormal) Collected: 02/11/25 1111    Specimen: Blood Updated: 02/11/25 1113     Glucose 200 mg/dL      Comment: Serial Number: [id number]:  [id number]       POC Glucose Once [[id number]]  (Abnormal) Collected: 02/11/25 1038    Specimen: Blood Updated: 02/11/25 1042     Glucose 186 mg/dL      Comment: Serial Number: [id number]:  [id number]             Radiology:   Imaging Results (Last 72 Hours)       Procedure Component Value Units Date/Time    US Renal Bilateral [[id number]] Collected: 02/11/25 1030     Updated: 02/11/25 1037    Narrative:      US RENAL BILATERAL    Date of Exam: 2/11/2025 10:06 AM EST    Indication: GWEN/CKD.    Comparison: CT abdomen pelvis 4/8/2019    Technique: Grayscale and color Doppler ultrasound evaluation of the kidneys and urinary bladder was performed.      Findings:  Right kidney measures 8.3 x 3.6 x 4.5 cm and the left 8.8 x 4.2 x 5.7 cm. Increased renal cortical echogenicity on the right, possibly  artifactual.. Diffuse renal cortical thinning favor senescent change. Nonspecific hypoechoic lesion exophytic off the   left upper renal pole measuring up to 2 cm stable from 2019. 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 No hydronephrosis or echogenic shadowing stone. Large solid vascular lesion in the posterior aspect of the   urinary bladder measuring up to 8.7 x 5.7 x 4.9 cm. This is more than expected for simple BPH and median lobe hypertrophy. Bladder volume 587 cc.      Impression:      Impression:  1. No hydronephrosis.  2. Symmetric renal atrophy and diffuse renal cortical thinning.  3. Incidental large intraluminal bladder mass suspicious for neoplasm. Recommend urology consultation for management.    Findings communicated with ordering provider Dr. Romero via [employer] secure chat, message acknowledged 10:35 a.m. 2/11/2025.    Electronically Signed: Dave Buckley MD    2/11/2025 10:35 AM EST    Workstation ID: [id number]    XR Chest 1 View [[id number]] Collected: 02/10/25 1656     Updated: 02/10/25 1658    Narrative:      XR CHEST 1 VW    Date of Exam: 2/10/2025 4:51 PM EST    Indication: hypertension    Comparison: AP chest 4/1/2019    Findings:  Heart size is mildly enlarged but stable with signs of median sternotomy CABG. Pulmonary vascular distribution is normal. Lungs are clear. No pleural effusion, pneumothorax or acute osseous abnormality.      Impression:      Impression:  No acute chest finding.      Electronically Signed: Jenn Olmedo MD    2/10/2025 4:56 PM EST    Workstation ID: [id number]            Assessment/Plan:    Bladder cancer patient of Dr. Rodriguez's who has been trying to get cardiac clearance since November 2024..  Patient is currently in the hospital and undergoing a cardiac workup.  Patient will see Dr. Rodriguez next week once he is cardiac cleared for his removal of his bladder cancer    Naseem Kwok MD  2/12/2025  07:03 EST

## 2025-02-12 NOTE — PLAN OF CARE
Problem: Adult Inpatient Plan of Care  Goal: Plan of Care Review  Outcome: Progressing  Goal: Patient-Specific Goal (Individualized)  Outcome: Progressing  Goal: Absence of Hospital-Acquired Illness or Injury  Outcome: Progressing  Intervention: Identify and Manage Fall Risk  Recent Flowsheet Documentation  Taken 2/12/2025 1603 by Tawny Venegas LPN  Safety Promotion/Fall Prevention:   activity supervised   assistive device/personal items within reach   clutter free environment maintained   lighting adjusted   safety round/check completed   room organization consistent  Taken 2/12/2025 1400 by Tawny Venegas LPN  Safety Promotion/Fall Prevention:   activity supervised   assistive device/personal items within reach   clutter free environment maintained   lighting adjusted   room organization consistent   safety round/check completed  Taken 2/12/2025 1210 by Tawny Venegas LPN  Safety Promotion/Fall Prevention:   safety round/check completed   room organization consistent   lighting adjusted   activity supervised   assistive device/personal items within reach   clutter free environment maintained  Taken 2/12/2025 1000 by Tawny Venegas LPN  Safety Promotion/Fall Prevention: patient off unit  Taken 2/12/2025 0800 by Tawny Venegas LPN  Safety Promotion/Fall Prevention:   activity supervised   clutter free environment maintained   assistive device/personal items within reach   lighting adjusted   room organization consistent   safety round/check completed  Intervention: Prevent Skin Injury  Recent Flowsheet Documentation  Taken 2/12/2025 1603 by Tawny Venegas LPN  Skin Protection: incontinence pads utilized  Taken 2/12/2025 0800 by Tawny Venegas LPN  Skin Protection: incontinence pads utilized  Intervention: Prevent and Manage VTE (Venous Thromboembolism) Risk  Recent Flowsheet Documentation  Taken 2/12/2025 0800 by Tawny Venegas LPN  VTE Prevention/Management: (Heparin SQ)   SCDs (sequential  compression devices) off   patient refused intervention   other (see comments)  Intervention: Prevent Infection  Recent Flowsheet Documentation  Taken 2/12/2025 1603 by Tawny Venegas LPN  Infection Prevention:   cohorting utilized   environmental surveillance performed   hand hygiene promoted   personal protective equipment utilized   rest/sleep promoted   single patient room provided   visitors restricted/screened  Taken 2/12/2025 1400 by Tawny Venegas LPN  Infection Prevention:   cohorting utilized   environmental surveillance performed   hand hygiene promoted   personal protective equipment utilized   rest/sleep promoted   single patient room provided   visitors restricted/screened  Taken 2/12/2025 1210 by Tawny Venegas LPN  Infection Prevention:   cohorting utilized   environmental surveillance performed   hand hygiene promoted   personal protective equipment utilized   rest/sleep promoted   single patient room provided   visitors restricted/screened  Taken 2/12/2025 0800 by Tawny Venegas LPN  Infection Prevention:   cohorting utilized   environmental surveillance performed   personal protective equipment utilized   hand hygiene promoted   rest/sleep promoted   single patient room provided   visitors restricted/screened  Goal: Optimal Comfort and Wellbeing  Outcome: Progressing  Intervention: Monitor Pain and Promote Comfort  Recent Flowsheet Documentation  Taken 2/12/2025 1603 by Tawny Venegas LPN  Pain Management Interventions:   care clustered   diversional activity provided  Taken 2/12/2025 1210 by Tawny Venegas LPN  Pain Management Interventions:   care clustered   diversional activity provided  Taken 2/12/2025 0800 by Tawny Venegas LPN  Pain Management Interventions:   care clustered   diversional activity provided   position adjusted  Intervention: Provide Person-Centered Care  Recent Flowsheet Documentation  Taken 2/12/2025 1603 by Tawny Venegas LPN  Trust Relationship/Rapport:    care explained   choices provided  Taken 2/12/2025 1210 by Tawny Venegas LPN  Trust Relationship/Rapport:   care explained   choices provided  Taken 2/12/2025 0800 by Tawny Venegas LPN  Trust Relationship/Rapport:   care explained   choices provided   thoughts/feelings acknowledged  Goal: Readiness for Transition of Care  Outcome: Progressing   Goal Outcome Evaluation:        Patient went down for stress test this shift. Patient returned with no issues noted. Blood pressure has been monitored per staff. No c/o pain. Urine has been clear yellow this shift. Call light within reach.

## 2025-02-12 NOTE — PLAN OF CARE
Goal Outcome Evaluation:  Plan of Care Reviewed With: patient, daughter    Outcome Evaluation: Pt is a 83 y/o male admitted to [facility] on 2/10/25 with c/o HTN urgency. He was seen with outpatient cardiology Dr. Milton for pre-operative clearance for bladder mass/prostate resection. In the office noted to have significantly elevated blood pressures and sent in to ED. PMHx significant for CAD, PVD, htn, suspected bladder malignancy, BPH, T2DM, and tobacco use. At baseline, pt resides with spouse, adult son and grandson in a bi-level home with x7 AYANA with HR. Pt reports he is IND with I/ADLs, actively driving, and does not utilzie any DME, however owns a cane and walker. Pt with walk-in shower with shower chair for bathing. Upon assessment, pt A&O x4 with no reports or pain. Pt demos functional ROM and fair ROM. Pt IND doffed/don socks and completes all functional tranfers and ambulation with CGA for safety. Based on assessment, no OT concerns identified at this time and pt exhibits safe functional transfers and ambulates household distances with no LOB or SOA. OT recommending return home with family assist as needed. OT will sign-off and complete orders, please re-consult if pt has a change in status.    Anticipated Discharge Disposition (OT): home with assist

## 2025-02-12 NOTE — TELEPHONE ENCOUNTER
Name: [facility]    Relationship:     Best Callback Number: [phone]    HUB PROVIDED THE RELAY MESSAGE FROM THE OFFICE   PATIENT HAS FURTHER QUESTIONS AND WOULD LIKE A CALL BACK

## 2025-02-12 NOTE — TELEPHONE ENCOUNTER
Left vm for sweta. Patient currently inpatient, having stress test today. Also being seen by dr clemens as inpatient.     Hub can read.

## 2025-02-12 NOTE — PROGRESS NOTES
[facility] MEDICINE SERVICE  DAILY PROGRESS NOTE    NAME: Dave Peguero  : 1940  MRN: [id number]      LOS: 1 day     PROVIDER OF SERVICE: Timothy Duane Brammell, MD    Chief Complaint: Hypertensive urgency    Subjective:     Interval History:  History taken from: patient    Patient was some cough and wheeze at times.  He denies any chest pain.  Denies any severe dizziness.  Eating without issue.  Still has some blood with urination at times.  Had some bowel function.  Denies any other additional acute issues.        Review of Systems:   Review of Systems   All other systems reviewed and are negative.      Objective:     Vital Signs  Temp:  [97.4 °F (36.3 °C)-98.3 °F (36.8 °C)] 97.4 °F (36.3 °C)  Heart Rate:  [] 95  Resp:  [12-21] 18  BP: (155-225)/() 176/69   Body mass index is 29.07 kg/m².    Physical Exam  Physical Exam  Vitals reviewed.   Constitutional:       General: He is not in acute distress.     Appearance: Normal appearance.   HENT:      Head: Normocephalic.   Cardiovascular:      Rate and Rhythm: Normal rate and regular rhythm.   Pulmonary:      Effort: Pulmonary effort is normal.      Breath sounds: Wheezing present.   Abdominal:      General: Bowel sounds are normal.      Tenderness: There is no abdominal tenderness.   Musculoskeletal:         General: No swelling.   Neurological:      Mental Status: He is alert. Mental status is at baseline.            Diagnostic Data    Results from last 7 days   Lab Units 25  0445 25  0615 02/10/25  1545   WBC 10*3/mm3 10.75   < > 7.53   HEMOGLOBIN g/dL 11.1*   < > 11.3*   HEMATOCRIT % 33.6*   < > 34.4*   PLATELETS 10*3/mm3 207   < > 201   GLUCOSE mg/dL 142*   < > 133*   CREATININE mg/dL 1.96*   < > 2.23*   BUN mg/dL 32*   < > 35*   SODIUM mmol/L 137   < > 136   POTASSIUM mmol/L 4.4   < > 5.1   AST (SGOT) U/L  --   --  16   ALT (SGPT) U/L  --   --  11   ALK PHOS U/L  --   --  76   BILIRUBIN mg/dL  --   --  <0.2   ANION GAP  mmol/L 16.6*   < > 12.1    < > = values in this interval not displayed.       US Renal Bilateral    Result Date: 2/11/2025  Impression: 1. No hydronephrosis. 2. Symmetric renal atrophy and diffuse renal cortical thinning. 3. Incidental large intraluminal bladder mass suspicious for neoplasm. Recommend urology consultation for management. Findings communicated with ordering provider Dr. Romero via [employer] secure chat, message acknowledged 10:35 a.m. 2/11/2025. Electronically Signed: Dave Buckley MD  2/11/2025 10:35 AM EST  Workstation ID: [id number]    XR Chest 1 View    Result Date: 2/10/2025  Impression: No acute chest finding. Electronically Signed: Jenn Olmedo MD  2/10/2025 4:56 PM EST  Workstation ID: [id number]           Assessment:  Hypertension  Probable bladder carcinoma/ bladder mass.  Hematuria  Coronary artery disease/CABG  Peripheral arterial disease  Acute kidney injury superimposed on chronic kidney disease  Type 2 diabetes  COPD with pulmonary wheeze     Plan.  Nebulization therapy.  Oral antihypertensives being adjusted by cardiology.  Urology/nephrology following.  Antiplatelet agents on hold with planned operative procedure      Active and Resolved Problems  Active Hospital Problems    Diagnosis  POA    **Hypertensive urgency [I16.0]  Yes      Resolved Hospital Problems   No resolved problems to display.           VTE Prophylaxis:  Pharmacologic VTE prophylaxis orders are present.             Disposition Planning:     Barriers to Discharge:cardiology clearance and improvement in renal function  Anticipated Date of Discharge: 2/13  Place of Discharge: home      Time: 30 minutes     Code Status and Medical Interventions: No CPR (Do Not Attempt to Resuscitate); Full Support   Ordered at: 02/10/25 6292     Level Of Support Discussed With:    Patient     Code Status (Patient has no pulse and is not breathing):    No CPR (Do Not Attempt to Resuscitate)     Medical Interventions (Patient has pulse or is  breathing):    Full Support       Signature: Electronically signed by Timothy Duane Brammell, MD, 02/12/25, 11:28 EST.  [facility]ist Team

## 2025-02-12 NOTE — TELEPHONE ENCOUNTER
Caller: [facility]    Relationship:     Best call back number:        [phone]      [facility] IS REQUESTING FOR THE PATIENT TO BE CLEARED, REQUEST WAS SENT TO THE OFFICE

## 2025-02-12 NOTE — CONSULTS
Initial spiritual care visit. Pt in room alone and invited  to sit. Pt is a former  and wanted to talk about the Lord. He spoke of God's faithfulness in his life and that includes this stay in the hospital. He loves the Lord and relies heavily on his iveth to remained encouraged. He seems to have no fear of outcomes and trusts the Lord to help him through whatever comes his way. 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 Pt asked for prayer at the end of the visit and  prayed per his request. Pt accepted offer for a follow up visit tomorrow. There were no other needs after the prayer.

## 2025-02-12 NOTE — PROGRESS NOTES
[facility]     Progress Note    Patient Name: Dave Peguero  : 1940  MRN: [id number]  Primary Care Physician:  Fidel Le MD  Date of admission: 2/10/2025    Subjective   Subjective     Chief Complaint: gwen on ckd III    History of Present Illness  Patient with gwen on ckd III with hematuria and proteinuria    Review of Systems    Objective   Objective     Vitals:   Temp:  [97.4 °F (36.3 °C)-98.3 °F (36.8 °C)] 97.4 °F (36.3 °C)  Heart Rate:  [] 95  Resp:  [12-21] 18  BP: (155-225)/() 176/69    Physical Exam   General Appearance:  In no acute distress.    HEENT: Normal HEENT exam.     Extremities: .  There is no deformity, effusion or dependent edema.    Neurological: Patient is alert   Result Review    Result Review:  I have personally reviewed the results from the time of this admission to 2025 09:59 EST and agree with these findings:  []  Laboratory list / accordion  []  Microbiology  []  Radiology  []  EKG/Telemetry   []  Cardiology/Vascular   []  Pathology  []  Old records  []  Other:  Most notable findings include:       Assessment & Plan   Assessment / Plan     Brief Patient Summary:  Dave Peguero is a 84 y.o. male who has gwen on ckd III    Active Hospital Problems:  Active Hospital Problems    Diagnosis     **Hypertensive urgency      Plan:   1) GWEN  2) CKD - Cr 1.8-1.9 at the end of   3) Hematuria  4) Proteinuria - reassess when less acute and hematuria resolved  5) HTN urgency  6) Bladder mass    Patient seen and examined. Awake alert. No distress. Labs and chart reviewed. No complaints today. Cr 1.9 which is likely baseline. Bladder mass noted. Urology following. Plans for surgery next week. Anticoagulation plans per cardiology. Will follow.    Alma Reza MD

## 2025-02-12 NOTE — CONSULTS
Cardiology [facility]        Subjective:           Patient ID: Dave Peguero is a 84 y.o. male.    Chief Complaint: HTN Urgency, pre operative risk assessment     Referring Physician: Fidel Arias MD   Seen and examined greater than 50% of total encounter time in medical decision making performed by me    HPI:  Dave Peguero is a 84 y.o. male who presents with hypertensive urgency from cardiology office.  Mr. Peguero was seen by Dr. Milton yesterday for new patient appointment. He previously saw Dr. Galan in 2019. Pmh includes peripheral vascular disease s/p previous lower extremity bypass and stenting, CAD s/p prior CABG (left internal mammary graft to left anterior descending, vein graft to the obtuse marginal and vein graft to the acute marginal branch of the right coronary artery with laser TMR of the diaphragmatic surface of the left ventricle), CKD, diabetes mellitus, hypertension, hyperlipidemia, carotid artery stenosis and breast cancer.     Mr. Peguero presented to cardiology office yesterday to have pre operative risk assessment prior to bladder tumor resection with prostate resection. He was noted to be hypertensive with SBP > 200 and was advised to come to the ER.    Patient tells me he is able to do all his ADLs. He lives independently with his wife. He climbs stairs and denies chest pain but has some minor shortness of breath.   ===================================================  No distress  Hypertensive overnight with as needed medicines administered  Discussed care with daughter at bedside    Stress today without significant reversible ischemia, preserved EF, old inferior infarct, reviewed and interpreted by me    Review of systems otherwise negative x 14 point review of systems except as mentioned above  Historical data copied forward from previous encounters in EMR is unchanged    Hemodynamics reviewed  Telemetry vitals reviewed      Past Medical History:   Diagnosis Date     Arthritis     Atherosclerosis of autologous vein bypass graft of right lower extremity with intermittent claudication     Atherosclerosis of native arteries of extremities with intermittent claudication, left leg     Cancer     breast cancer hx    Carotid artery stenosis     Cerebral vascular disease     CHF (congestive heart failure)     Coronary artery disease     Diabetes mellitus     type 1    Hyperlipidemia     Hypertension     Kidney stone     Myocardial infarction     x 3       Past Surgical History:   Procedure Laterality Date    CARDIAC CATHETERIZATION      CORONARY ARTERY BYPASS GRAFT      TRIPLE    DENTAL PROCEDURE      all teeth have been pulled    FEMORAL ARTERY STENT Bilateral     JOINT REPLACEMENT Right     knee    PROSTATE SURGERY         Family History   Problem Relation Age of Onset    COPD Mother     Heart disease Mother     Stroke Mother     Hypertension Mother     Cancer Father        Social History     Socioeconomic History    Marital status:    Tobacco Use    Smoking status: Every Day     Current packs/day: 0.50     Types: Cigarettes    Smokeless tobacco: Never   Vaping Use    Vaping status: Never Used   Substance and Sexual Activity    Alcohol use: No    Drug use: No    Sexual activity: Defer         Allergies   Allergen Reactions    Benadryl [Diphenhydramine] Unknown (See Comments)     Unknown allergy, but daughter is also allergic and states it causes throat swelling with her.       Current Medications:   Scheduled Meds:amLODIPine, 5 mg, Oral, BID  [Held by provider] aspirin, 81 mg, Oral, Daily  atorvastatin, 20 mg, Oral, Q PM  carvedilol, 25 mg, Oral, BID With Meals  [Held by provider] clopidogrel, 75 mg, Oral, Daily  heparin (porcine), 5,000 Units, Subcutaneous, Q8H  hydrALAZINE, 50 mg, Oral, Q8H  insulin lispro, 2-7 Units, Subcutaneous, 4x Daily AC & at Bedtime  ipratropium-albuterol, 3 mL, Nebulization, TID - RT  isosorbide mononitrate, 30 mg, Oral, Q24H  minoxidil, 2.5 mg, Oral,  "Q24H  pantoprazole, 40 mg, Oral, Q AM  polyethylene glycol, 17 g, Oral, Daily  senna-docusate sodium, 1 tablet, Oral, BID      Continuous Infusions:       Review of Systems   Constitutional: Negative for chills, diaphoresis and malaise/fatigue.   Cardiovascular:  Positive for dyspnea on exertion. Negative for chest pain, irregular heartbeat, leg swelling, near-syncope, orthopnea, palpitations, paroxysmal nocturnal dyspnea and syncope.   Respiratory:  Negative for cough, shortness of breath, sleep disturbances due to breathing and sputum production.    Gastrointestinal:  Negative for change in bowel habit.   Genitourinary:  Positive for hematuria. Negative for urgency.   Neurological:  Negative for dizziness and headaches.   Psychiatric/Behavioral:  Negative for altered mental status.           Objective:         /56   Pulse 73   Temp 97.5 °F (36.4 °C) (Oral)   Resp 18   Ht 175.3 cm (69\")   Wt 89.3 kg (196 lb 13.9 oz)   SpO2 95%   BMI 29.07 kg/m²     Physical Exam:  General Appearance:    Alert, cooperative, in no acute distress                                Head: Atraumatic, normocephalic, PERRLA               Neck:   supple, trachea midline, no thyromegaly, no carotid bruit, no JVD   Lungs:     Clear to auscultation,respirations regular, even and               unlabored    Heart:    Regular rhythm and normal rate, normal S1 and S2, murmur   Abdomen:     Normal bowel sounds, no masses, no organomegaly, soft  nontender, nondistended, no guarding, no rebound  tenderness   Extremities:   Moves all extremities well, no edema, no cyanosis, no  redness   Pulses:   Pulses palpable and equal bilaterally   Skin:   No bleeding, bruising or rash   Neurologic:   Awake, alert, oriented x3     Unchanged from prior            ASCVD Risk Score::  The ASCVD Risk score (Werner DK, et al., 2019) failed to calculate for the following reasons:    The 2019 ASCVD risk score is only valid for ages 40 to 79      Lab Review: " "    Results from last 7 days   Lab Units 02/12/25  0445 02/11/25  0615 02/10/25  1545   SODIUM mmol/L 137 137 136   POTASSIUM mmol/L 4.4 5.2 5.1   CHLORIDE mmol/L 103 102 100   CO2 mmol/L 17.4* 24.3 23.9   BUN mg/dL 32* 37* 35*   CREATININE mg/dL 1.96* 2.28* 2.23*   GLUCOSE mg/dL 142* 135* 133*   CALCIUM mg/dL 9.4 9.1 9.7   AST (SGOT) U/L  --   --  16   ALT (SGPT) U/L  --   --  11         Results from last 7 days   Lab Units 02/12/25 0445 02/11/25  0615   WBC 10*3/mm3 10.75 6.91   HEMOGLOBIN g/dL 11.1* 9.9*   HEMATOCRIT % 33.6* 30.6*   PLATELETS 10*3/mm3 207 200         Results from last 7 days   Lab Units 02/12/25 0445 02/11/25  0615   MAGNESIUM mg/dL 2.2 2.2           Invalid input(s): \"LDLCALC\"            Recent Radiology:  Imaging Results (Most Recent)       Procedure Component Value Units Date/Time    US Renal Bilateral [[id number]] Collected: 02/11/25 1030     Updated: 02/11/25 1037    Narrative:      US RENAL BILATERAL    Date of Exam: 2/11/2025 10:06 AM EST    Indication: GWEN/CKD.    Comparison: CT abdomen pelvis 4/8/2019    Technique: Grayscale and color Doppler ultrasound evaluation of the kidneys and urinary bladder was performed.      Findings:  Right kidney measures 8.3 x 3.6 x 4.5 cm and the left 8.8 x 4.2 x 5.7 cm. Increased renal cortical echogenicity on the right, possibly artifactual.. Diffuse renal cortical thinning favor senescent change. Nonspecific hypoechoic lesion exophytic off the   left upper renal pole measuring up to 2 cm stable from 2019. 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 No hydronephrosis or echogenic shadowing stone. Large solid vascular lesion in the posterior aspect of the   urinary bladder measuring up to 8.7 x 5.7 x 4.9 cm. This is more than expected for simple BPH and median lobe hypertrophy. Bladder volume 587 cc.      Impression:      Impression:  1. No hydronephrosis.  2. Symmetric renal atrophy and diffuse renal cortical " thinning.  3. Incidental large intraluminal bladder mass suspicious for neoplasm. Recommend urology consultation for management.    Findings communicated with ordering provider Dr. Romero via [employer] secure chat, message acknowledged 10:35 a.m. 2/11/2025.    Electronically Signed: Dave Buckley MD    2/11/2025 10:35 AM EST    Workstation ID: [id number]    XR Chest 1 View [[id number]] Collected: 02/10/25 1656     Updated: 02/10/25 1658    Narrative:      XR CHEST 1 VW    Date of Exam: 2/10/2025 4:51 PM EST    Indication: hypertension    Comparison: AP chest 4/1/2019    Findings:  Heart size is mildly enlarged but stable with signs of median sternotomy CABG. Pulmonary vascular distribution is normal. Lungs are clear. No pleural effusion, pneumothorax or acute osseous abnormality.      Impression:      Impression:  No acute chest finding.      Electronically Signed: Jenn Olmedo MD    2/10/2025 4:56 PM EST    Workstation ID: [id number]              ECHOCARDIOGRAM:    Results for orders placed in visit on 04/25/19    Adult Transthoracic Echo Complete W/ Cont if Necessary Per Protocol    Interpretation Summary  · Left ventricular systolic function is normal. Calculated EF = 70%. Estimated EF was in agreement with the calculated EF. The left ventricular cavity is small. Left ventricular wall thickness is consistent with mild-to-moderate concentric hypertrophy. Left ventricular diastolic dysfunction is noted (grade I) consistent with impaired relaxation.  · Left atrial cavity size is mildly dilated.  · The aortic valve is not well visualized. The aortic valve is abnormal in structure. There is moderate-to-severe thickening of the aortic valve.  · By doppler, there is no significant aortic stenosis, but it visually appears to be mildly stenotic.  · Borderline dilation of the aortic root is present.                  Assessment:         Active Hospital Problems    Diagnosis  POA    **Hypertensive urgency [I16.0]  Yes     HTN  Urgency    2. Pre operative risk assessment  Will need stress testing    3. Bladder mass    4. CAD   CAD s/p prior CABG (left internal mammary graft to left anterior descending, vein graft to the obtuse marginal and vein graft to the acute marginal branch of the right coronary artery with laser TMR of the diaphragmatic surface of the left ventricle)    5. PVD   s/p previous lower extremity bypass and stenting    6. HLD    7. GWEN on CKD     Plan:   Will check 2D ECHO  Stress evaluation today reviewed interpreted me to, old inferior infarct with minimal adele-infarct ischemia preserved EF of 50%  Does not need invasive ischemic evaluation prior to noncardiovascular surgery okay to proceed for bladder surgery with significant hematuria  Discontinued aspirin Plavix in anticipation  Advancing on antihypertensives with poorly controlled blood pressure despite prior advances  Coreg 25 twice daily  Amlodipine 5 twice daily  Hydralazine 50 every 8 hours  Minoxidil 2.5 daily  Avoid nephrotoxic medications    Further recommendations to follow findings      Ryan Milton MD, PhD             Ryan Milton MD  02/11/25  16:30 EST

## 2025-02-13 ENCOUNTER — APPOINTMENT (OUTPATIENT)
Dept: CARDIOLOGY | Facility: HOSPITAL | Age: 85
DRG: 305 | End: 2025-02-13
Payer: MEDICARE

## 2025-02-13 VITALS
SYSTOLIC BLOOD PRESSURE: 138 MMHG | DIASTOLIC BLOOD PRESSURE: 73 MMHG | OXYGEN SATURATION: 96 % | HEIGHT: 69 IN | WEIGHT: 196 LBS | TEMPERATURE: 94.7 F | HEART RATE: 73 BPM | BODY MASS INDEX: 29.03 KG/M2 | RESPIRATION RATE: 20 BRPM

## 2025-02-13 LAB
ANION GAP SERPL CALCULATED.3IONS-SCNC: 12.7 MMOL/L (ref 5–15)
BASOPHILS # BLD AUTO: 0.04 10*3/MM3 (ref 0–0.2)
BASOPHILS NFR BLD AUTO: 0.6 % (ref 0–1.5)
BUN SERPL-MCNC: 40 MG/DL (ref 8–23)
BUN/CREAT SERPL: 17.9 (ref 7–25)
C-ANCA TITR SER IF: NORMAL TITER
CALCIUM SPEC-SCNC: 8.9 MG/DL (ref 8.6–10.5)
CHLORIDE SERPL-SCNC: 103 MMOL/L (ref 98–107)
CO2 SERPL-SCNC: 20.3 MMOL/L (ref 22–29)
CREAT SERPL-MCNC: 2.24 MG/DL (ref 0.76–1.27)
DEPRECATED RDW RBC AUTO: 43.5 FL (ref 37–54)
EGFRCR SERPLBLD CKD-EPI 2021: 27.8 ML/MIN/1.73
EOSINOPHIL # BLD AUTO: 0.05 10*3/MM3 (ref 0–0.4)
EOSINOPHIL NFR BLD AUTO: 0.7 % (ref 0.3–6.2)
ERYTHROCYTE [DISTWIDTH] IN BLOOD BY AUTOMATED COUNT: 13.2 % (ref 12.3–15.4)
GBM AB SER IA-ACNC: <0.2 UNITS (ref 0–0.9)
GLUCOSE BLDC GLUCOMTR-MCNC: 152 MG/DL (ref 70–105)
GLUCOSE BLDC GLUCOMTR-MCNC: 261 MG/DL (ref 70–105)
GLUCOSE SERPL-MCNC: 130 MG/DL (ref 65–99)
HCT VFR BLD AUTO: 28.3 % (ref 37.5–51)
HGB BLD-MCNC: 9.2 G/DL (ref 13–17.7)
IMM GRANULOCYTES # BLD AUTO: 0.02 10*3/MM3 (ref 0–0.05)
IMM GRANULOCYTES NFR BLD AUTO: 0.3 % (ref 0–0.5)
LYMPHOCYTES # BLD AUTO: 1.39 10*3/MM3 (ref 0.7–3.1)
LYMPHOCYTES NFR BLD AUTO: 20.1 % (ref 19.6–45.3)
MAGNESIUM SERPL-MCNC: 2.3 MG/DL (ref 1.6–2.4)
MCH RBC QN AUTO: 29.2 PG (ref 26.6–33)
MCHC RBC AUTO-ENTMCNC: 32.5 G/DL (ref 31.5–35.7)
MCV RBC AUTO: 89.8 FL (ref 79–97)
MONOCYTES # BLD AUTO: 0.93 10*3/MM3 (ref 0.1–0.9)
MONOCYTES NFR BLD AUTO: 13.4 % (ref 5–12)
MYELOPEROXIDASE AB SER IA-ACNC: <0.2 UNITS (ref 0–0.9)
NEUTROPHILS NFR BLD AUTO: 4.5 10*3/MM3 (ref 1.7–7)
NEUTROPHILS NFR BLD AUTO: 64.9 % (ref 42.7–76)
NRBC BLD AUTO-RTO: 0 /100 WBC (ref 0–0.2)
P-ANCA ATYPICAL TITR SER IF: NORMAL TITER
P-ANCA TITR SER IF: NORMAL TITER
PHOSPHATE SERPL-MCNC: 4.3 MG/DL (ref 2.5–4.5)
PLATELET # BLD AUTO: 172 10*3/MM3 (ref 140–450)
PMV BLD AUTO: 10.7 FL (ref 6–12)
POTASSIUM SERPL-SCNC: 4.6 MMOL/L (ref 3.5–5.2)
PROTEINASE3 AB SER IA-ACNC: <0.2 UNITS (ref 0–0.9)
RBC # BLD AUTO: 3.15 10*6/MM3 (ref 4.14–5.8)
SODIUM SERPL-SCNC: 136 MMOL/L (ref 136–145)
WBC NRBC COR # BLD AUTO: 6.93 10*3/MM3 (ref 3.4–10.8)

## 2025-02-13 PROCEDURE — 25010000002 HEPARIN (PORCINE) PER 1000 UNITS: Performed by: STUDENT IN AN ORGANIZED HEALTH CARE EDUCATION/TRAINING PROGRAM

## 2025-02-13 PROCEDURE — 82948 REAGENT STRIP/BLOOD GLUCOSE: CPT

## 2025-02-13 PROCEDURE — 93306 TTE W/DOPPLER COMPLETE: CPT

## 2025-02-13 PROCEDURE — 63710000001 INSULIN LISPRO (HUMAN) PER 5 UNITS: Performed by: STUDENT IN AN ORGANIZED HEALTH CARE EDUCATION/TRAINING PROGRAM

## 2025-02-13 PROCEDURE — 93306 TTE W/DOPPLER COMPLETE: CPT | Performed by: INTERNAL MEDICINE

## 2025-02-13 PROCEDURE — 99232 SBSQ HOSP IP/OBS MODERATE 35: CPT | Performed by: INTERNAL MEDICINE

## 2025-02-13 PROCEDURE — 25010000002 SULFUR HEXAFLUORIDE MICROSPH 60.7-25 MG RECONSTITUTED SUSPENSION: Performed by: HOSPITALIST

## 2025-02-13 RX ORDER — HYDRALAZINE HYDROCHLORIDE 10 MG/1
10 TABLET, FILM COATED ORAL 3 TIMES DAILY
Qty: 270 TABLET | Refills: 0 | Status: SHIPPED | OUTPATIENT
Start: 2025-02-13

## 2025-02-13 RX ORDER — CARVEDILOL 25 MG/1
25 TABLET ORAL 2 TIMES DAILY WITH MEALS
Qty: 60 TABLET | Refills: 1 | Status: SHIPPED | OUTPATIENT
Start: 2025-02-13

## 2025-02-13 RX ORDER — MINOXIDIL 2.5 MG/1
2.5 TABLET ORAL
Qty: 30 TABLET | Refills: 0 | Status: SHIPPED | OUTPATIENT
Start: 2025-02-14

## 2025-02-13 RX ORDER — ISOSORBIDE MONONITRATE 30 MG/1
30 TABLET, EXTENDED RELEASE ORAL
Qty: 30 TABLET | Refills: 1 | Status: SHIPPED | OUTPATIENT
Start: 2025-02-14

## 2025-02-13 RX ADMIN — HYDRALAZINE HYDROCHLORIDE 50 MG: 25 TABLET ORAL at 14:10

## 2025-02-13 RX ADMIN — HEPARIN SODIUM 5000 UNITS: 5000 INJECTION INTRAVENOUS; SUBCUTANEOUS at 06:40

## 2025-02-13 RX ADMIN — INSULIN LISPRO 4 UNITS: 100 INJECTION, SOLUTION INTRAVENOUS; SUBCUTANEOUS at 12:13

## 2025-02-13 RX ADMIN — SENNOSIDES AND DOCUSATE SODIUM 1 TABLET: 50; 8.6 TABLET ORAL at 08:06

## 2025-02-13 RX ADMIN — ISOSORBIDE MONONITRATE 30 MG: 30 TABLET, EXTENDED RELEASE ORAL at 08:06

## 2025-02-13 RX ADMIN — INSULIN LISPRO 2 UNITS: 100 INJECTION, SOLUTION INTRAVENOUS; SUBCUTANEOUS at 08:19

## 2025-02-13 RX ADMIN — CARVEDILOL 25 MG: 25 TABLET, FILM COATED ORAL at 08:06

## 2025-02-13 RX ADMIN — HYDRALAZINE HYDROCHLORIDE 50 MG: 25 TABLET ORAL at 06:42

## 2025-02-13 RX ADMIN — PANTOPRAZOLE SODIUM 40 MG: 40 TABLET, DELAYED RELEASE ORAL at 06:38

## 2025-02-13 RX ADMIN — SULFUR HEXAFLUORIDE 2 ML: KIT at 11:30

## 2025-02-13 RX ADMIN — MINOXIDIL 2.5 MG: 2.5 TABLET ORAL at 08:06

## 2025-02-13 RX ADMIN — POLYETHYLENE GLYCOL 3350 17 G: 17 POWDER, FOR SOLUTION ORAL at 08:06

## 2025-02-13 RX ADMIN — ACETAMINOPHEN 325 MG: 325 TABLET, FILM COATED ORAL at 08:19

## 2025-02-13 RX ADMIN — AMLODIPINE BESYLATE 5 MG: 5 TABLET ORAL at 08:06

## 2025-02-13 NOTE — PROGRESS NOTES
"[facility]:     LOS: 2 days    Patient Care Team:  Fidel Le MD as PCP - General (Internal Medicine)  Kristin Krause MD as PCP - Family Medicine    Chief Complaint:  GWEN/CKD    Subjective     Interval History:   Chart reviewed  Sleeping  Non-oliguric    Objective     Vital Sign Min/Max for last 24 hours  Temp  Min: 97.4 °F (36.3 °C)  Max: 98 °F (36.7 °C)   BP  Min: 107/54  Max: 176/69   Pulse  Min: 70  Max: 101   Resp  Min: 16  Max: 25   SpO2  Min: 91 %  Max: 96 %   No data recorded   No data recorded     Flowsheet Rows      Flowsheet Row First Filed Value   Admission Height 175.3 cm (69\") Documented at 02/10/2025 1513   Admission Weight 91.2 kg (201 lb) Documented at 02/10/2025 1513            I/O this shift:  In: -   Out: 800 [Urine:800]  I/O last 3 completed shifts:  In: 2320 [P.O.:1320; I.V.:1000]  Out: 3300 [Urine:3300]    Physical Exam:  GEN: Awake, NAD  ENT: PERRL, EOMI, MMM  NECK: Supple, no JVD  CHEST: CTAB, no W/R/C  CV: RRR, no M/G/R  ABD: Soft, NT, +BS  SKIN: Warm and Dry  NEURO: CN's intact      WBC WBC   Date Value Ref Range Status   02/12/2025 6.93 3.40 - 10.80 10*3/mm3 Final   02/12/2025 10.75 3.40 - 10.80 10*3/mm3 Final   02/11/2025 6.91 3.40 - 10.80 10*3/mm3 Final   02/10/2025 7.53 3.40 - 10.80 10*3/mm3 Final      HGB Hemoglobin   Date Value Ref Range Status   02/12/2025 9.2 (L) 13.0 - 17.7 g/dL Final   02/12/2025 11.1 (L) 13.0 - 17.7 g/dL Final   02/11/2025 9.9 (L) 13.0 - 17.7 g/dL Final   02/10/2025 11.3 (L) 13.0 - 17.7 g/dL Final      HCT Hematocrit   Date Value Ref Range Status   02/12/2025 28.3 (L) 37.5 - 51.0 % Final   02/12/2025 33.6 (L) 37.5 - 51.0 % Final   02/11/2025 30.6 (L) 37.5 - 51.0 % Final   02/10/2025 34.4 (L) 37.5 - 51.0 % Final      Platlets No results found for: \"LABPLAT\"   MCV MCV   Date Value Ref Range Status   02/12/2025 89.8 79.0 - 97.0 fL Final   02/12/2025 88.0 79.0 - 97.0 fL Final   02/11/2025 89.5 79.0 - 97.0 fL Final   02/10/2025 89.8 79.0 - " "97.0 fL Final          Sodium Sodium   Date Value Ref Range Status   02/12/2025 136 136 - 145 mmol/L Final   02/12/2025 137 136 - 145 mmol/L Final   02/11/2025 137 136 - 145 mmol/L Final   02/10/2025 136 136 - 145 mmol/L Final      Potassium Potassium   Date Value Ref Range Status   02/12/2025 4.6 3.5 - 5.2 mmol/L Final   02/12/2025 4.4 3.5 - 5.2 mmol/L Final     Comment:     Specimen hemolyzed.  Result may be falsely elevated.   02/11/2025 5.2 3.5 - 5.2 mmol/L Final     Comment:     Specimen hemolyzed.  Result may be falsely elevated.   02/10/2025 5.1 3.5 - 5.2 mmol/L Final      Chloride Chloride   Date Value Ref Range Status   02/12/2025 103 98 - 107 mmol/L Final   02/12/2025 103 98 - 107 mmol/L Final   02/11/2025 102 98 - 107 mmol/L Final   02/10/2025 100 98 - 107 mmol/L Final      CO2 CO2   Date Value Ref Range Status   02/12/2025 20.3 (L) 22.0 - 29.0 mmol/L Final   02/12/2025 17.4 (L) 22.0 - 29.0 mmol/L Final   02/11/2025 24.3 22.0 - 29.0 mmol/L Final   02/10/2025 23.9 22.0 - 29.0 mmol/L Final      BUN BUN   Date Value Ref Range Status   02/12/2025 40 (H) 8 - 23 mg/dL Final   02/12/2025 32 (H) 8 - 23 mg/dL Final   02/11/2025 37 (H) 8 - 23 mg/dL Final   02/10/2025 35 (H) 8 - 23 mg/dL Final      Creatinine Creatinine   Date Value Ref Range Status   02/12/2025 2.24 (H) 0.76 - 1.27 mg/dL Final   02/12/2025 1.96 (H) 0.76 - 1.27 mg/dL Final   02/11/2025 2.28 (H) 0.76 - 1.27 mg/dL Final   02/10/2025 2.23 (H) 0.76 - 1.27 mg/dL Final      Calcium Calcium   Date Value Ref Range Status   02/12/2025 8.9 8.6 - 10.5 mg/dL Final   02/12/2025 9.4 8.6 - 10.5 mg/dL Final   02/11/2025 9.1 8.6 - 10.5 mg/dL Final   02/10/2025 9.7 8.6 - 10.5 mg/dL Final      PO4 No results found for: \"CAPO4\"   Albumin Albumin   Date Value Ref Range Status   02/10/2025 4.3 3.5 - 5.2 g/dL Final      Magnesium Magnesium   Date Value Ref Range Status   02/12/2025 2.3 1.6 - 2.4 mg/dL Final   02/12/2025 2.2 1.6 - 2.4 mg/dL Final   02/11/2025 2.2 1.6 - 2.4 " "mg/dL Final      Uric Acid No results found for: \"URICACID\"        Results Review:     I reviewed the patient's new clinical results.    amLODIPine, 5 mg, Oral, BID  atorvastatin, 20 mg, Oral, Q PM  carvedilol, 25 mg, Oral, BID With Meals  heparin (porcine), 5,000 Units, Subcutaneous, Q8H  hydrALAZINE, 50 mg, Oral, Q8H  insulin lispro, 2-7 Units, Subcutaneous, 4x Daily AC & at Bedtime  ipratropium-albuterol, 3 mL, Nebulization, TID - RT  isosorbide mononitrate, 30 mg, Oral, Q24H  minoxidil, 2.5 mg, Oral, Q24H  pantoprazole, 40 mg, Oral, Q AM  polyethylene glycol, 17 g, Oral, Daily  senna-docusate sodium, 1 tablet, Oral, BID      Medication Review: Reviewed    Assessment & Plan     1) GWEN  2) CKD3b - Cr 1.8-1.9 at the end of 2024  3) Hematuria  4) Proteinuria - reassess when less acute and hematuria resolved  5) HTN urgency  6) Bladder mass     PLAN: Cr narrowly higher at 2.2 but still close to likely baseline.  BP improved.  Cardiology following.  Urology following for eventual bladder and prostate surgery.  Avoid nephrotoxins and IV contrast.  Will follow.       Luis Romero MD  [facility]  02/13/25  04:57 EST      "

## 2025-02-13 NOTE — PLAN OF CARE
Goal Outcome Evaluation:           Progress: improving  Outcome Evaluation: Cardiology, urology, and nephrology cleared patient to go home today. Patient will have surgery with urology sometime next week.

## 2025-02-13 NOTE — PLAN OF CARE
Goal Outcome Evaluation:         Pt rested comfortably throughout the night with no complaints. Possible discharge on today's date, Urology is following. Possible surgery next week. Safety precautions in place and will continue to monitor.

## 2025-02-13 NOTE — DISCHARGE SUMMARY
[facility] Medicine Services  Discharge Summary    Date of Service: 2025  Patient Name: Dave Peguero  : 1940  MRN: [id number]    Date of Admission: 2/10/2025  Discharge Diagnosis:     Hypertension  Probable bladder carcinoma/ bladder mass.  Hematuria  Coronary artery disease/CABG  Peripheral arterial disease  Acute kidney injury superimposed on chronic kidney disease  Type 2 diabetes  COPD with pulmonary wheeze     Date of Discharge: 2025  Primary Care Physician: Fidel Le MD      Presenting Problem:   Hypertensive urgency [I16.0]  Hypertension, unspecified type [I10]    Active and Resolved Hospital Problems:  Active Hospital Problems    Diagnosis POA    **Hypertensive urgency [I16.0] Yes      Resolved Hospital Problems   No resolved problems to display.         Hospital Course     HPI:    Patient presented from urology office with elevated blood pressure as discussed in history and physical of February 10, 2025.    Hospital Course:  This is a somewhat younger than stated age 84-year-old white male with known bladder mass being evaluated for resection.  He was found to have significantly elevated blood pressure when seen in the office.  He had a temperature elevation during his hospitalization.  Oxygenation was maintained on room air.  Blood pressures were significantly elevated on presentation and improved with additional antihypertensives.  He had no microbiology studies.  Renal ultrasound showed no evidence of hydronephrosis.  He had noted renal atrophy and diffuse renal cortical thinning.  He again had his identified bladder mass.  Chest x-ray showed no acute process.  He was seen in evaluation by cardiology for ischemic evaluation for preoperative consideration.  Echocardiogram was performed and is pending interpretation at time of discharge.  Stress test was interpreted by cardiology and not felt to represent any significant acute ischemic changes.  He has  antiplatelet therapy held with need to be off for 5 days prior to his urologic procedure.  He had some persistent blood-tinged urine at times.  His initial hemoglobin was 11.3 with hematocrit 34.4.  He had no leukocytosis.  He had some noted identified acute renal insufficiency on presentation with a creatinine of 2.23 and was followed by nephrology felt that he was at his baseline renal function.  He was symptomatically improved.  It was thought that he could be discharged home on his oral antihypertensives and followed up by urology as to his planned operative procedure as well as follow-up with primary care and nephrology as to his renal insufficiency.  Blood sugars were fairly well-controlled off his metformin medication and it was felt that he should remain off his metformin and monitor his blood sugars with noted need for consideration as to appropriate diabetic agents in light of his renal insufficiency that should be followed up by primary care when seen in the office.              Day of Discharge     Vital Signs:  Temp:  [94.7 °F (34.8 °C)-98 °F (36.7 °C)] 94.7 °F (34.8 °C)  Heart Rate:  [70-79] 73  Resp:  [13-25] 20  BP: (116-158)/(55-77) 116/60    Physical Exam:  Physical Exam  Vitals reviewed.   Constitutional:       General: He is in acute distress.      Appearance: Normal appearance.   HENT:      Head: Normocephalic.   Cardiovascular:      Rate and Rhythm: Normal rate and regular rhythm.   Pulmonary:      Effort: Pulmonary effort is normal.      Breath sounds: Normal breath sounds.   Abdominal:      General: Bowel sounds are normal.   Musculoskeletal:         General: No swelling.   Neurological:      Mental Status: He is alert. Mental status is at baseline.            Pertinent  and/or Most Recent Results     LAB RESULTS:      Lab 02/12/25  2323 02/12/25  0445 02/11/25  0615 02/10/25  1545   WBC 6.93 10.75 6.91 7.53   HEMOGLOBIN 9.2* 11.1* 9.9* 11.3*   HEMATOCRIT 28.3* 33.6* 30.6* 34.4*   PLATELETS  172 207 200 201   NEUTROS ABS 4.50 8.68* 4.41 4.94   IMMATURE GRANS (ABS) 0.02 0.04 0.03 0.02   LYMPHS ABS 1.39 1.27 1.40 1.52   MONOS ABS 0.93* 0.69 0.83 0.85   EOS ABS 0.05 0.04 0.20 0.15   MCV 89.8 88.0 89.5 89.8         Lab 02/12/25  2323 02/12/25  0445 02/11/25  0615 02/10/25  1545   SODIUM 136 137 137 136   POTASSIUM 4.6 4.4 5.2 5.1   CHLORIDE 103 103 102 100   CO2 20.3* 17.4* 24.3 23.9   ANION GAP 12.7 16.6* 10.7 12.1   BUN 40* 32* 37* 35*   CREATININE 2.24* 1.96* 2.28* 2.23*   EGFR 27.8* 32.7* 27.3* 28.0*   GLUCOSE 130* 142* 135* 133*   CALCIUM 8.9 9.4 9.1 9.7   MAGNESIUM 2.3 2.2 2.2  --    PHOSPHORUS 4.3 3.8 4.8*  --          Lab 02/10/25  1545   TOTAL PROTEIN 7.1   ALBUMIN 4.3   GLOBULIN 2.8   ALT (SGPT) 11   AST (SGOT) 16   BILIRUBIN <0.2   ALK PHOS 76                     Brief Urine Lab Results  (Last result in the past 365 days)        Color   Clarity   Blood   Leuk Est   Nitrite   Protein   CREAT   Urine HCG        02/11/25 1822             39.5               Microbiology Results (last 10 days)       ** No results found for the last 240 hours. **            US Renal Bilateral    Result Date: 2/11/2025  Impression: Impression: 1. No hydronephrosis. 2. Symmetric renal atrophy and diffuse renal cortical thinning. 3. Incidental large intraluminal bladder mass suspicious for neoplasm. Recommend urology consultation for management. Findings communicated with ordering provider Dr. Romero via [employer] secure chat, message acknowledged 10:35 a.m. 2/11/2025. Electronically Signed: Dave Buckley MD  2/11/2025 10:35 AM EST  Workstation ID: [id number]    XR Chest 1 View    Result Date: 2/10/2025  Impression: Impression: No acute chest finding. Electronically Signed: Jenn Olmedo MD  2/10/2025 4:56 PM EST  Workstation ID: [id number]     Results for orders placed during the hospital encounter of 10/31/19    Doppler arterial multi level lower extremity bilateral CAR    Interpretation Summary  · Right Conclusion: The  right BOB is moderately reduced. Moderate digital ischemia.  · Left Conclusion: The left BOB is moderately reduced. Waveforms are consistent with femoral disease and popliteal disease. Moderate digital ischemia.      Results for orders placed during the hospital encounter of 10/31/19    Doppler arterial multi level lower extremity bilateral CAR    Interpretation Summary  · Right Conclusion: The right BOB is moderately reduced. Moderate digital ischemia.  · Left Conclusion: The left BOB is moderately reduced. Waveforms are consistent with femoral disease and popliteal disease. Moderate digital ischemia.      Results for orders placed in visit on 04/25/19    Adult Transthoracic Echo Complete W/ Cont if Necessary Per Protocol    Interpretation Summary  · Left ventricular systolic function is normal. Calculated EF = 70%. Estimated EF was in agreement with the calculated EF. The left ventricular cavity is small. Left ventricular wall thickness is consistent with mild-to-moderate concentric hypertrophy. Left ventricular diastolic dysfunction is noted (grade I) consistent with impaired relaxation.  · Left atrial cavity size is mildly dilated.  · The aortic valve is not well visualized. The aortic valve is abnormal in structure. There is moderate-to-severe thickening of the aortic valve.  · By doppler, there is no significant aortic stenosis, but it visually appears to be mildly stenotic.  · Borderline dilation of the aortic root is present.      Labs Pending at Discharge:  Pending Results       Procedure [Order ID] Specimen - Date/Time    ANCA Panel [[id number]] Collected: 02/11/25 0615    Specimen: Blood from Arm, Left Updated: 02/11/25 0625    Adult Transthoracic Echo Complete w/ Color, Spectral and Contrast if Necessary Per Protocol [[id number]] Resulted: 02/13/25 1135     Updated: 02/13/25 1135     LVIDd 3.8 cm      LVIDs 2.8 cm      IVSd 1.60 cm      LVPWd 1.50 cm      FS 26.3 %      IVS/LVPW 1.07 cm      ESV(cubed)  22.0 ml      LV Sys Vol (BSA corrected) 12.8 cm2      EDV(cubed) 54.9 ml      LV Yun Vol (BSA corrected) 39.0 cm2      LV mass(C)d 228.3 grams      LVOT area 3.8 cm2      LVOT diam 2.20 cm      EDV(MOD-sp4) 79.9 ml      ESV(MOD-sp4) 26.3 ml      SV(MOD-sp4) 53.6 ml      SVi(MOD-SP4) 26.2 ml/m2      SVi (LVOT) 51.0 ml/m2      EF(MOD-sp4) 56.6 %      MV E max michael 95.0 cm/sec      MV A max michael 126.0 cm/sec      MV dec time 0.27 sec      MV E/A 0.75     Pulm A Revs Dur 0.10 sec      Med Peak E' Michael 6.5 cm/sec      Lat Peak E' Michael 5.9 cm/sec      Avg E/e' ratio 15.32     SV(LVOT) 104.5 ml      RVIDd 2.5 cm      TAPSE (>1.6) 2.09 cm      RV S' 8.3 cm/sec      LA dimension (2D)  4.6 cm      Pulm Sys Michael 62.2 cm/sec      Pulm Yun Michael 63.4 cm/sec      Pulm S/D 0.98     Pulm A Revs Michael 19.8 cm/sec      LV V1 max 110.0 cm/sec      LV V1 max PG 4.8 mmHg      LV V1 mean PG 3.0 mmHg      LV V1 VTI 27.5 cm      Ao pk michael 269.0 cm/sec      Ao max PG 28.9 mmHg      Ao mean PG 19.0 mmHg      Ao V2 VTI 69.7 cm      BRENNEN(I,D) 1.50 cm2      MV max PG 7.4 mmHg      MV mean PG 3.0 mmHg      MV V2 VTI 37.7 cm      MV P1/2t 98.6 msec      MVA(P1/2t) 2.23 cm2      MVA(VTI) 2.8 cm2      MV dec slope 262.0 cm/sec2      RV V1 max PG 3.9 mmHg      RV V1 max 99.3 cm/sec      RV V1 VTI 18.9 cm      PA V2 max 125.0 cm/sec      PA acc time 0.12 sec      ACS 1.20 cm      Sinus 3.8 cm      STJ 2.5 cm      EF(MOD-bp) 57.0 %      EF - Contrast (4Ch) 67.1 cm2      Dimensionless Index 0.42 (DI)     Glomerular Basement Membrane Antibodies [[id number]] Collected: 02/11/25 0615    Specimen: Blood from Arm, Left Updated: 02/11/25 0625            Procedures Performed           Consults:   Consults       Date and Time Order Name Status Description    2/11/2025 10:35 AM Inpatient Urology Consult Completed     2/10/2025  7:46 PM Inpatient Cardiology Consult Completed     2/10/2025  7:45 PM Inpatient Nephrology Consult Completed               Discharge Details         Discharge Medications        New Medications        Instructions Start Date   carvedilol 25 MG tablet  Commonly known as: COREG   25 mg, Oral, 2 Times Daily With Meals      isosorbide mononitrate 30 MG 24 hr tablet  Commonly known as: IMDUR   30 mg, Oral, Every 24 Hours Scheduled   Start Date: February 14, 2025     minoxidil 2.5 MG tablet  Commonly known as: LONITEN   2.5 mg, Oral, Every 24 Hours Scheduled   Start Date: February 14, 2025            Changes to Medications        Instructions Start Date   hydrALAZINE 10 MG tablet  Commonly known as: APRESOLINE  What changed:   how much to take  how to take this  when to take this  Another medication with the same name was removed. Continue taking this medication, and follow the directions you see here.   10 mg, Oral, 3 Times Daily             Continue These Medications        Instructions Start Date   amLODIPine 5 MG tablet  Commonly known as: NORVASC   1 tablet, 2 Times Daily      atorvastatin 20 MG tablet  Commonly known as: LIPITOR   20 mg, Every Evening      calcium carbonate 600 MG tablet  Commonly known as: OS-RUTH   600 mg, 2 Times Daily With Meals      cetirizine 10 MG tablet  Commonly known as: zyrTEC   10 mg, Oral, Daily      metoprolol succinate  MG 24 hr tablet  Commonly known as: TOPROL-XL   1 tablet, 3 times daily      pantoprazole 40 MG EC tablet  Commonly known as: PROTONIX   40 mg, Daily      sucralfate 1 g tablet  Commonly known as: CARAFATE   1 g, Every Other Day             Stop These Medications      aspirin 81 MG EC tablet     clopidogrel 75 MG tablet  Commonly known as: PLAVIX     metFORMIN 500 MG tablet  Commonly known as: GLUCOPHAGE     ondansetron 4 MG tablet  Commonly known as: ZOFRAN              Allergies   Allergen Reactions    Benadryl [Diphenhydramine] Unknown (See Comments)     Unknown allergy, but daughter is also allergic and states it causes throat swelling with her.         Discharge Disposition: HOME  Home or Self  Care    Diet:  Hospital:  Diet Order   Procedures    Diet: Cardiac, Diabetic; Healthy Heart (2-3 Na+); Consistent Carbohydrate; Fluid Consistency: Thin (IDDSI 0)         Discharge Activity:         CODE STATUS:  Code Status and Medical Interventions: No CPR (Do Not Attempt to Resuscitate); Full Support   Ordered at: 02/10/25 0413     Level Of Support Discussed With:    Patient     Code Status (Patient has no pulse and is not breathing):    No CPR (Do Not Attempt to Resuscitate)     Medical Interventions (Patient has pulse or is breathing):    Full Support         No future appointments.    Additional Instructions for the Follow-ups that You Need to Schedule       Discharge Follow-up with PCP   As directed       Currently Documented PCP:    Fidel Le MD    PCP Phone Number:    [phone]     Follow Up Details: one week        Discharge Follow-up with Specified Provider: nephrology; 3 Weeks   As directed      To: nephrology   Follow Up: 3 Weeks                Time spent on Discharge including face to face service:  45 minutes    Signature: Electronically signed by Timothy Duane Brammell, MD, 02/13/25, 13:52 EST.  [facility] Hospitalist Team

## 2025-02-13 NOTE — CONSULTS
Cardiology [place]        Subjective:           Patient ID: Dave Peguero is a 84 y.o. male.    Chief Complaint: HTN Urgency, pre operative risk assessment     Referring Physician: Fidel Arias MD   Seen and examined greater than 50% of total encounter time in medical decision making performed by me    HPI:  Dave Peguero is a 84 y.o. male who presents with hypertensive urgency from cardiology office.  Mr. Peguero was seen by Dr. Milton yesterday for new patient appointment. He previously saw Dr. Galan in 2019. Pmh includes peripheral vascular disease s/p previous lower extremity bypass and stenting, CAD s/p prior CABG (left internal mammary graft to left anterior descending, vein graft to the obtuse marginal and vein graft to the acute marginal branch of the right coronary artery with laser TMR of the diaphragmatic surface of the left ventricle), CKD, diabetes mellitus, hypertension, hyperlipidemia, carotid artery stenosis and breast cancer.     Mr. Peguero presented to cardiology office yesterday to have pre operative risk assessment prior to bladder tumor resection with prostate resection. He was noted to be hypertensive with SBP > 200 and was advised to come to the ER.    Patient tells me he is able to do all his ADLs. He lives independently with his wife. He climbs stairs and denies chest pain but has some minor shortness of breath.   ===================================================  No distress  Chest pain-free  Stress reviewed with the patient with no significant reversible ischemia, cleared for surgery with urology from a cardiac standpoint no further testing indicated  Blood pressures are better on present dose of antihypertensives goal was less than 150 systolic acutely        Stress yesterday without significant reversible ischemia, preserved EF, old inferior infarct, reviewed and interpreted by me    Review of systems otherwise negative x 14 point review of systems except as mentioned  above  Historical data copied forward from previous encounters in EMR is unchanged    Hemodynamics reviewed  Telemetry vitals reviewed      Past Medical History:   Diagnosis Date    Arthritis     Atherosclerosis of autologous vein bypass graft of right lower extremity with intermittent claudication     Atherosclerosis of native arteries of extremities with intermittent claudication, left leg     Cancer     breast cancer hx    Carotid artery stenosis     Cerebral vascular disease     CHF (congestive heart failure)     Coronary artery disease     Diabetes mellitus     type 1    Hyperlipidemia     Hypertension     Kidney stone     Myocardial infarction     x 3       Past Surgical History:   Procedure Laterality Date    CARDIAC CATHETERIZATION      CORONARY ARTERY BYPASS GRAFT      TRIPLE    DENTAL PROCEDURE      all teeth have been pulled    FEMORAL ARTERY STENT Bilateral     JOINT REPLACEMENT Right     knee    PROSTATE SURGERY         Family History   Problem Relation Age of Onset    COPD Mother     Heart disease Mother     Stroke Mother     Hypertension Mother     Cancer Father        Social History     Socioeconomic History    Marital status:    Tobacco Use    Smoking status: Every Day     Current packs/day: 0.50     Types: Cigarettes    Smokeless tobacco: Never   Vaping Use    Vaping status: Never Used   Substance and Sexual Activity    Alcohol use: No    Drug use: No    Sexual activity: Defer         Allergies   Allergen Reactions    Benadryl [Diphenhydramine] Unknown (See Comments)     Unknown allergy, but daughter is also allergic and states it causes throat swelling with her.       Current Medications:   Scheduled Meds:amLODIPine, 5 mg, Oral, BID  atorvastatin, 20 mg, Oral, Q PM  carvedilol, 25 mg, Oral, BID With Meals  heparin (porcine), 5,000 Units, Subcutaneous, Q8H  hydrALAZINE, 50 mg, Oral, Q8H  insulin lispro, 2-7 Units, Subcutaneous, 4x Daily AC & at Bedtime  ipratropium-albuterol, 3 mL,  "Nebulization, TID - RT  isosorbide mononitrate, 30 mg, Oral, Q24H  minoxidil, 2.5 mg, Oral, Q24H  pantoprazole, 40 mg, Oral, Q AM  polyethylene glycol, 17 g, Oral, Daily  senna-docusate sodium, 1 tablet, Oral, BID      Continuous Infusions:       Review of Systems   Constitutional: Negative for chills, diaphoresis and malaise/fatigue.   Cardiovascular:  Positive for dyspnea on exertion. Negative for chest pain, irregular heartbeat, leg swelling, near-syncope, orthopnea, palpitations, paroxysmal nocturnal dyspnea and syncope.   Respiratory:  Negative for cough, shortness of breath, sleep disturbances due to breathing and sputum production.    Gastrointestinal:  Negative for change in bowel habit.   Genitourinary:  Positive for hematuria. Negative for urgency.   Neurological:  Negative for dizziness and headaches.   Psychiatric/Behavioral:  Negative for altered mental status.           Objective:         /61 (BP Location: Right arm, Patient Position: Lying)   Pulse 78   Temp 97 °F (36.1 °C) (Temporal)   Resp 13   Ht 175.3 cm (69\")   Wt 89.3 kg (196 lb 13.9 oz)   SpO2 95%   BMI 29.07 kg/m²     Physical Exam:  General Appearance:    Alert, cooperative, in no acute distress                                Head: Atraumatic, normocephalic, PERRLA               Neck:   supple, trachea midline, no thyromegaly, no carotid bruit, no JVD   Lungs:     Clear to auscultation,respirations regular, even and               unlabored    Heart:    Regular rhythm and normal rate, normal S1 and S2, murmur   Abdomen:     Normal bowel sounds, no masses, no organomegaly, soft  nontender, nondistended, no guarding, no rebound  tenderness   Extremities:   Moves all extremities well, no edema, no cyanosis, no  redness   Pulses:   Pulses palpable and equal bilaterally   Skin:   No bleeding, bruising or rash   Neurologic:   Awake, alert, oriented x3     Unchanged from prior encounter            ASCVD Risk Score::  The ASCVD Risk score " "(Werner ALFREDO, et al., 2019) failed to calculate for the following reasons:    The 2019 ASCVD risk score is only valid for ages 40 to 79      Lab Review:     Results from last 7 days   Lab Units 02/12/25 2323 02/12/25 0445 02/11/25  0615 02/10/25  1545   SODIUM mmol/L 136 137   < > 136   POTASSIUM mmol/L 4.6 4.4   < > 5.1   CHLORIDE mmol/L 103 103   < > 100   CO2 mmol/L 20.3* 17.4*   < > 23.9   BUN mg/dL 40* 32*   < > 35*   CREATININE mg/dL 2.24* 1.96*   < > 2.23*   GLUCOSE mg/dL 130* 142*   < > 133*   CALCIUM mg/dL 8.9 9.4   < > 9.7   AST (SGOT) U/L  --   --   --  16   ALT (SGPT) U/L  --   --   --  11    < > = values in this interval not displayed.         Results from last 7 days   Lab Units 02/12/25 2323 02/12/25 0445   WBC 10*3/mm3 6.93 10.75   HEMOGLOBIN g/dL 9.2* 11.1*   HEMATOCRIT % 28.3* 33.6*   PLATELETS 10*3/mm3 172 207         Results from last 7 days   Lab Units 02/12/25 2323 02/12/25 0445   MAGNESIUM mg/dL 2.3 2.2           Invalid input(s): \"LDLCALC\"            Recent Radiology:  Imaging Results (Most Recent)       Procedure Component Value Units Date/Time    US Renal Bilateral [[id number]] Collected: 02/11/25 1030     Updated: 02/11/25 1037    Narrative:      US RENAL BILATERAL    Date of Exam: 2/11/2025 10:06 AM EST    Indication: GWEN/CKD.    Comparison: CT abdomen pelvis 4/8/2019    Technique: Grayscale and color Doppler ultrasound evaluation of the kidneys and urinary bladder was performed.      Findings:  Right kidney measures 8.3 x 3.6 x 4.5 cm and the left 8.8 x 4.2 x 5.7 cm. Increased renal cortical echogenicity on the right, possibly artifactual.. Diffuse renal cortical thinning favor senescent change. Nonspecific hypoechoic lesion exophytic off the   left upper renal pole measuring up to 2 cm stable from 2019. 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 No hydronephrosis or echogenic shadowing stone. Large solid vascular lesion in the posterior aspect " of the   urinary bladder measuring up to 8.7 x 5.7 x 4.9 cm. This is more than expected for simple BPH and median lobe hypertrophy. Bladder volume 587 cc.      Impression:      Impression:  1. No hydronephrosis.  2. Symmetric renal atrophy and diffuse renal cortical thinning.  3. Incidental large intraluminal bladder mass suspicious for neoplasm. Recommend urology consultation for management.    Findings communicated with ordering provider Dr. Romero via [email or web address] secure chat, message acknowledged 10:35 a.m. 2/11/2025.    Electronically Signed: Dave Buckley MD    2/11/2025 10:35 AM EST    Workstation ID: [id number]    XR Chest 1 View [[id number]] Collected: 02/10/25 1656     Updated: 02/10/25 1658    Narrative:      XR CHEST 1 VW    Date of Exam: 2/10/2025 4:51 PM EST    Indication: hypertension    Comparison: AP chest 4/1/2019    Findings:  Heart size is mildly enlarged but stable with signs of median sternotomy CABG. Pulmonary vascular distribution is normal. Lungs are clear. No pleural effusion, pneumothorax or acute osseous abnormality.      Impression:      Impression:  No acute chest finding.      Electronically Signed: Jenn Olmedo MD    2/10/2025 4:56 PM EST    Workstation ID: [id number]              ECHOCARDIOGRAM:    Results for orders placed in visit on 04/25/19    Adult Transthoracic Echo Complete W/ Cont if Necessary Per Protocol    Interpretation Summary  · Left ventricular systolic function is normal. Calculated EF = 70%. Estimated EF was in agreement with the calculated EF. The left ventricular cavity is small. Left ventricular wall thickness is consistent with mild-to-moderate concentric hypertrophy. Left ventricular diastolic dysfunction is noted (grade I) consistent with impaired relaxation.  · Left atrial cavity size is mildly dilated.  · The aortic valve is not well visualized. The aortic valve is abnormal in structure. There is moderate-to-severe thickening of the aortic valve.  · By doppler, there  is no significant aortic stenosis, but it visually appears to be mildly stenotic.  · Borderline dilation of the aortic root is present.                  Assessment:         Active Hospital Problems    Diagnosis  POA    **Hypertensive urgency [I16.0]  Yes     HTN Urgency    2. Pre operative risk assessment  Will need stress testing    3. Bladder mass    4. CAD   CAD s/p prior CABG (left internal mammary graft to left anterior descending, vein graft to the obtuse marginal and vein graft to the acute marginal branch of the right coronary artery with laser TMR of the diaphragmatic surface of the left ventricle)    5. PVD   s/p previous lower extremity bypass and stenting    6. HLD    7. GWEN on CKD     Plan:     Stress evaluation today reviewed interpreted me to, old inferior infarct with minimal [place]ele-infarct ischemia preserved EF of 50%  Does not need invasive ischemic evaluation prior to noncardiovascular surgery okay to proceed for bladder surgery with significant hematuria    Discontinued aspirin Plavix in anticipation of urologic surgery    Advancing on antihypertensives with poorly controlled blood pressure despite prior advances  Coreg 25 twice daily  Amlodipine 5 twice daily  Hydralazine 50 every 8 hours  Minoxidil 2.5 daily  Avoid nephrotoxic medications  Creatinine at 2.2  Appreciate renal involvement    Likely okay to send home today, follow-up with urology not blood pressure is better on current regimen    Blood pressure logs twice daily at home call to clinic if routinely greater than 150 systolic      Ryan Milton MD, PhD             Ryan Milton MD  02/11/25  08:59 EST

## 2025-02-14 ENCOUNTER — TELEPHONE (OUTPATIENT)
Dept: CARDIOLOGY | Facility: CLINIC | Age: 85
End: 2025-02-14
Payer: MEDICARE

## 2025-02-14 LAB
AORTIC DIMENSIONLESS INDEX: 0.42 (DI)
AV MEAN PRESS GRAD SYS DOP V1V2: 19 MMHG
AV VMAX SYS DOP: 269 CM/SEC
BH CV ECHO MEAS - ACS: 1.2 CM
BH CV ECHO MEAS - AO MAX PG: 28.9 MMHG
BH CV ECHO MEAS - AO V2 VTI: 69.7 CM
BH CV ECHO MEAS - AVA(I,D): 1.5 CM2
BH CV ECHO MEAS - CONTRAST EF 4CH: 67.1 CM2
BH CV ECHO MEAS - EDV(CUBED): 54.9 ML
BH CV ECHO MEAS - EDV(MOD-SP4): 79.9 ML
BH CV ECHO MEAS - EF(MOD-SP4): 67.1 %
BH CV ECHO MEAS - ESV(CUBED): 22 ML
BH CV ECHO MEAS - ESV(MOD-SP4): 26.3 ML
BH CV ECHO MEAS - FS: 26.3 %
BH CV ECHO MEAS - IVS/LVPW: 1.07 CM
BH CV ECHO MEAS - IVSD: 1.6 CM
BH CV ECHO MEAS - LA DIMENSION: 4.6 CM
BH CV ECHO MEAS - LAT PEAK E' VEL: 5.9 CM/SEC
BH CV ECHO MEAS - LV DIASTOLIC VOL/BSA (35-75): 39 CM2
BH CV ECHO MEAS - LV MASS(C)D: 228.3 GRAMS
BH CV ECHO MEAS - LV MAX PG: 4.8 MMHG
BH CV ECHO MEAS - LV MEAN PG: 3 MMHG
BH CV ECHO MEAS - LV SYSTOLIC VOL/BSA (12-30): 12.8 CM2
BH CV ECHO MEAS - LV V1 MAX: 110 CM/SEC
BH CV ECHO MEAS - LV V1 VTI: 27.5 CM
BH CV ECHO MEAS - LVIDD: 3.8 CM
BH CV ECHO MEAS - LVIDS: 2.8 CM
BH CV ECHO MEAS - LVOT AREA: 3.8 CM2
BH CV ECHO MEAS - LVOT DIAM: 2.2 CM
BH CV ECHO MEAS - LVPWD: 1.5 CM
BH CV ECHO MEAS - MED PEAK E' VEL: 6.5 CM/SEC
BH CV ECHO MEAS - MV A MAX VEL: 126 CM/SEC
BH CV ECHO MEAS - MV DEC SLOPE: 262 CM/SEC2
BH CV ECHO MEAS - MV DEC TIME: 0.27 SEC
BH CV ECHO MEAS - MV E MAX VEL: 95 CM/SEC
BH CV ECHO MEAS - MV E/A: 0.75
BH CV ECHO MEAS - MV MAX PG: 7.4 MMHG
BH CV ECHO MEAS - MV MEAN PG: 3 MMHG
BH CV ECHO MEAS - MV P1/2T: 98.6 MSEC
BH CV ECHO MEAS - MV V2 VTI: 37.7 CM
BH CV ECHO MEAS - MVA(P1/2T): 2.23 CM2
BH CV ECHO MEAS - MVA(VTI): 2.8 CM2
BH CV ECHO MEAS - PA ACC TIME: 0.12 SEC
BH CV ECHO MEAS - PA V2 MAX: 125 CM/SEC
BH CV ECHO MEAS - PULM A REVS DUR: 0.1 SEC
BH CV ECHO MEAS - PULM A REVS VEL: 19.8 CM/SEC
BH CV ECHO MEAS - PULM DIAS VEL: 63.4 CM/SEC
BH CV ECHO MEAS - PULM S/D: 0.98
BH CV ECHO MEAS - PULM SYS VEL: 62.2 CM/SEC
BH CV ECHO MEAS - RV MAX PG: 3.9 MMHG
BH CV ECHO MEAS - RV V1 MAX: 99.3 CM/SEC
BH CV ECHO MEAS - RV V1 VTI: 18.9 CM
BH CV ECHO MEAS - RVDD: 2.5 CM
BH CV ECHO MEAS - SV(LVOT): 104.5 ML
BH CV ECHO MEAS - SV(MOD-SP4): 53.6 ML
BH CV ECHO MEAS - SVI(LVOT): 51 ML/M2
BH CV ECHO MEAS - SVI(MOD-SP4): 26.2 ML/M2
BH CV ECHO MEAS - TAPSE (>1.6): 2.09 CM
BH CV ECHO MEASUREMENTS AVERAGE E/E' RATIO: 15.32
BH CV XLRA - TDI S': 8.3 CM/SEC
LV EF BIPLANE MOD: 57 %
SINUS: 3.8 CM
STJ: 2.5 CM

## 2025-02-14 RX ORDER — CLOPIDOGREL BISULFATE 75 MG/1
75 TABLET ORAL DAILY
COMMUNITY
End: 2025-02-24 | Stop reason: HOSPADM

## 2025-02-14 RX ORDER — ASPIRIN 81 MG/1
81 TABLET ORAL DAILY
COMMUNITY
End: 2025-02-24 | Stop reason: HOSPADM

## 2025-02-14 NOTE — PAT
Secure Chat sent to Dr. Rodriguez.  Patient hemoglobin 9.2 and creatine 2.24.  Dr. Rodriguez states ok to proceed with surgery on 2/20/25.

## 2025-02-14 NOTE — TELEPHONE ENCOUNTER
Caller: Grisel Cody    Relationship: Emergency Contact    Best call back number: [phone]    Which medication are you concerned about: ASPIRIN, PLAVIX, METFORMIN     Who prescribed you this medication: DR MELTON AND DR EDEN    What are your concerns: PATIENT WAS IN HOSPITAL AND THE MEDICATION LIST WAS CHANGED HIS DIABETES MEDICATION ISN'T EVEN ON HIS LIST FROM THE DISCHARGE OR HE ASPIRIN OR PLAVIX. PATIENT WAS TRYING TO GET CLEARANCE FOR BLADER CANCER SURGERY AND ONLY NEEDED TO STOP THE MEDICATIONS FOR FIVE DAYS PRIOR TO THE SURGERY. SURGERY HASN'T BEEN SCHEDULED YET. PATIENT'S DAUGHTER CALLED CONCERNED AND STATED THAT PATIENT'S BP IS LOW AND THAT HE'S FEELING DIZZY AND NEEDS SOMEONE FROM THE CLINICAL STAFF TO CALL AND GO OVER THE LIST.

## 2025-02-14 NOTE — CASE MANAGEMENT/SOCIAL WORK
Case Management Discharge Note      Final Note: Home with [facility]         Selected Continued Care - Discharged on 2/13/2025 Admission date: 2/10/2025 - Discharge disposition: Home or Self Care      Destination Coordination complete.      Service Provider Services Address Phone Fax Patient Preferred    [facility] [address], [place] [address] [phone] --                 Transportation Services  Private: Car    Final Discharge Disposition Code: 50 - home with hospice

## 2025-02-18 ENCOUNTER — TELEPHONE (OUTPATIENT)
Dept: CARDIOLOGY | Facility: CLINIC | Age: 85
End: 2025-02-18

## 2025-02-18 PROCEDURE — 93000 ELECTROCARDIOGRAM COMPLETE: CPT | Performed by: INTERNAL MEDICINE

## 2025-02-18 NOTE — PROGRESS NOTES
Cardiology Clinic Note  Ryan Milton MD, PhD    Subjective:     Encounter Date:02/10/2025      Patient ID: Dave Peguero is a 84 y.o. male.    Chief Complaint:  Chief Complaint   Patient presents with    Establish Care     Cardiac Clearance        HPI:  At the pleasure to see this 84-year-old as a new patient clinic for cardiac clearance with history of old MI, diabetes hypertension hyperlipidemia, coronary disease with bypass in 2018, .  Patient has a need for bladder surgery with bladder cancer and significant hematuria.  He denies any anginal chest pain is well compensated and euvolemic.  He is severely hypertensive today 210/89, recheck was 189/84.    EKG sinus rhythm with inferolateral ST abnormality, cannot rule out ischemia versus underlying repolarization abnormality with underlying right bundle branch block and sinus rhythm, cannot rule out prior infarct    After long discussion we will admit the patient for blood pressure control and further workup perioperatively    Since encounter patient was admitted to the hospital, blood pressure was controlled over a couple of days and stress and echo evaluation documented below.   echo demonstrated 60 to 65% EF with mild concentric LVH, grade 1 diastolic dysfunction with calcification of the aortic valve with mild to moderate aortic valve stenosis by gradient but severe limitation of systolic excursion, Doppler velocity may be underestimated, ANA was recommended if clinically indicated.  Stress demonstrated EF of 50% with small to moderate inferior wall diminished counts cannot rule out infarct with very mild or small adele-infarct ischemia versus GI attenuation.  Summed difference score of only 2, no significant anterior anteroseptal anterolateral or apical reversibility    Review of systems otherwise negative x 14 point review of systems except as mentioned above  Historical data copied forward from previous encounters in EMR including the history, exam, and  "assessment/plan has been reviewed and is unchanged unless noted otherwise.    Cardiac medicines reviewed with risk, benefits, and necessity of each discussed.    Risk and benefit of cardiac testing reviewed including death heart attack stroke pain bleeding infection need for vascular /cardiovascular surgery were discussed and the patient     Objective:         BP (!) 198/84   Pulse 66   Resp 18   Ht 175.3 cm (69\")   Wt 91.2 kg (201 lb)   SpO2 94%   BMI 29.68 kg/m²     Physical Exam  Regular rate and rhythm no rubs gallops heave or lift 2 out of 6 systolic ejection murmur lower sternal border with preserved S2  No clubbing cyanosis or significant edema  Radial pulses 2+ equal bilaterally  Skin is warm and dry  Intact grossly  No carotid bruits or JVD  Assessment:       Severely uncontrolled hypertension  Coronary artery disease with bypass 2018  History of MI  Diabetes as comorbidity affecting care  Hyperlipidemia  Hypertension  History of heart failure  Tobacco abuse current three-quarter pack per day smoker counseled to quit    Perioperative evaluation    Ultimately patient was admitted right after our encounter.  He underwent stress and echo evaluation demonstrating preserved EF, not more than moderate aortic valve stenosis certainly not critical by gradients with preserved EF.  Stress evaluation revealed no large areas of ischemia    Blood pressures were controlled 1 40-1 50 systolic      No indication for ischemic evaluation invasively prior to noncardiovascular surgery including bladder cancer and he was taken off his aspirin and Plavix during that hospitalization    We will see him back 1 to 3 months after surgery or perioperatively as needed.    His current regimen consists of amlodipine 10 mg daily  Atorvastatin 20 daily  Aspirin Plavix on hold  Carvedilol 25 twice daily  Hydralazine 10 3 times daily along with isosorbide mononitrate  Minoxidil 2.5 daily    Goal blood pressure simply less than 140 " systolic acutely      The pleasure to be involved in this patient's cardiovascular care.  Please call with any questions or concerns  Ryan Milton MD, PhD    Most recent EKG as reviewed and interpreted by me:    ECG 12 Lead    Date/Time: 2/18/2025 10:50 AM  Performed by: Ryan Milton MD    Authorized by: Ryan Milton MD  Comparison: not compared with previous ECG   Previous ECG: no previous ECG available  Rhythm: sinus rhythm  Conduction: right bundle branch block    Clinical impression: abnormal EKG  Comments: Nonspecific ST-T wave abnormalities  Right bundle branch block  Consider ischemia           Most recent echo as reviewed and interpreted by me:  Results for orders placed in visit on 04/25/19    Adult Transthoracic Echo Complete W/ Cont if Necessary Per Protocol    Interpretation Summary  · Left ventricular systolic function is normal. Calculated EF = 70%. Estimated EF was in agreement with the calculated EF. The left ventricular cavity is small. Left ventricular wall thickness is consistent with mild-to-moderate concentric hypertrophy. Left ventricular diastolic dysfunction is noted (grade I) consistent with impaired relaxation.  · Left atrial cavity size is mildly dilated.  · The aortic valve is not well visualized. The aortic valve is abnormal in structure. There is moderate-to-severe thickening of the aortic valve.  · By doppler, there is no significant aortic stenosis, but it visually appears to be mildly stenotic.  · Borderline dilation of the aortic root is present.      Most recent stress test as reviewed and interpreted by me:      Most recent cardiac catheterization as reviewed interpreted by me:  No results found for this or any previous visit.    The following portions of the patient's history were reviewed and updated as appropriate: allergies, current medications, past family history, past medical history, past social history, past surgical history, and problem  list.      ROS:  14 point review of systems negative except as mentioned above    Current Outpatient Medications:     amLODIPine (NORVASC) 5 MG tablet, Take 1 tablet by mouth 2 (Two) Times a Day., Disp: , Rfl:     atorvastatin (LIPITOR) 20 MG tablet, Take 1 tablet by mouth Every Evening., Disp: , Rfl:     calcium carbonate (OS-RUTH) 600 MG tablet, Take 1 tablet by mouth 2 (Two) Times a Day With Meals., Disp: , Rfl:     cetirizine (zyrTEC) 10 MG tablet, Take 1 tablet by mouth Daily., Disp: 90 tablet, Rfl: 1    aspirin 81 MG EC tablet, Take 1 tablet by mouth Daily., Disp: , Rfl:     carvedilol (COREG) 25 MG tablet, Take 1 tablet by mouth 2 (Two) Times a Day With Meals., Disp: 60 tablet, Rfl: 1    clopidogrel (PLAVIX) 75 MG tablet, Take 1 tablet by mouth Daily., Disp: , Rfl:     hydrALAZINE (APRESOLINE) 10 MG tablet, Take 1 tablet by mouth 3 (Three) Times a Day., Disp: 270 tablet, Rfl: 0    isosorbide mononitrate (IMDUR) 30 MG 24 hr tablet, Take 1 tablet by mouth Daily., Disp: 30 tablet, Rfl: 1    minoxidil (LONITEN) 2.5 MG tablet, Take 1 tablet by mouth Daily., Disp: 30 tablet, Rfl: 0    pantoprazole (PROTONIX) 40 MG EC tablet, Take 1 tablet by mouth Daily., Disp: , Rfl:     sucralfate (CARAFATE) 1 g tablet, Take 1 tablet by mouth Every Other Day., Disp: , Rfl:     Problem List:  Patient Active Problem List   Diagnosis    Obstructive sleep apnea syndrome    Hypertensive urgency     Past Medical History:  Past Medical History:   Diagnosis Date    Arthritis     Atherosclerosis of autologous vein bypass graft of right lower extremity with intermittent claudication     Atherosclerosis of native arteries of extremities with intermittent claudication, left leg     Cancer     breast cancer hx    Carotid artery stenosis     Cerebral vascular disease     CHF (congestive heart failure)     Coronary artery disease     Diabetes mellitus     type 1    Hyperlipidemia     Hypertension     Kidney stone     Myocardial infarction     x  3     Past Surgical History:  Past Surgical History:   Procedure Laterality Date    CARDIAC CATHETERIZATION      CORONARY ARTERY BYPASS GRAFT      TRIPLE    DENTAL PROCEDURE      all teeth have been pulled    FEMORAL ARTERY STENT Bilateral     JOINT REPLACEMENT Right     knee    PROSTATE SURGERY       Social History:  Social History     Socioeconomic History    Marital status:    Tobacco Use    Smoking status: Former     Current packs/day: 0.50     Types: Cigarettes    Smokeless tobacco: Never   Vaping Use    Vaping status: Never Used   Substance and Sexual Activity    Alcohol use: No    Drug use: No    Sexual activity: Defer     Allergies:  Allergies   Allergen Reactions    Benadryl [Diphenhydramine] Unknown (See Comments)     Unknown allergy, but daughter is also allergic and states it causes throat swelling with her.     Immunizations:    There is no immunization history on file for this patient.         In-Office Procedure(s):  No orders to display        ASCVD RIsk Score::  The ASCVD Risk score (Werner DK, et al., 2019) failed to calculate for the following reasons:    The 2019 ASCVD risk score is only valid for ages 40 to 79    Imaging:    Results for orders placed during the hospital encounter of 04/01/19    XR Chest 1 View    Narrative  PORTABLE CHEST 04/01/2019 AT 6:10 PM    CLINICAL HISTORY: Dyspnea    Compared to the previous chest x-ray dated 08/19/2014.    The lungs are somewhat poorly inflated, but appear free of infiltrates.  There are no pleural effusions. The heart is normal in size. Stigmata of  previous coronary artery bypass procedure are now evident.    IMPRESSIONS: No evidence of acute disease within the chest.    This report was finalized on 4/1/2019 6:29 PM by Dr. Luis Fletcher M.D.       Results for orders placed during the hospital encounter of 04/01/19    CT Abdomen Pelvis With Contrast    Narrative  CT SCANS OF THE ABDOMEN AND PELVIS WITH INTRAVENOUS CONTRAST    HISTORY: Nausea  and vomiting. Abdominal pain.    FINDINGS: The CT scan was performed as an emergency procedure through  the abdomen and pelvis with intravenous contrast and demonstrates the  followin. The lung bases are clear. The liver, spleen, pancreas, and both  adrenal glands are unremarkable. The gallbladder shows no gallstones or  wall thickening.  2. There is a partially exophytic solid-appearing mass involving the  upper pole of the left kidney measuring 1.7 x 1.7 x 2.4 cm. This is  highly suspicious for a renal cell carcinoma. The right kidney is  unremarkable.  3. There is no aortic aneurysm or retroperitoneal lymphadenopathy. There  appears to be slight narrowing at the origin of the celiac artery and  there is also very prominent calcification involving the superior  mesenteric artery resulting in moderate segmental narrowing.  4. The large and small bowel loops are normal in caliber. There is no  evidence of obstruction or inflammatory change. The appendix appears  normal. In the pelvis there is enlargement of the prostate measuring 6.5  cm.            Radiation dose reduction techniques were utilized, including automated  exposure control and exposure modulation based on body size.    This report was finalized on 2019 8:47 PM by Dr. Diaz Porter M.D.      Results for orders placed during the hospital encounter of 19    CT Abdomen Pelvis With Contrast    Narrative  CT SCANS OF THE ABDOMEN AND PELVIS WITH INTRAVENOUS CONTRAST    HISTORY: Nausea and vomiting. Abdominal pain.    FINDINGS: The CT scan was performed as an emergency procedure through  the abdomen and pelvis with intravenous contrast and demonstrates the  followin. The lung bases are clear. The liver, spleen, pancreas, and both  adrenal glands are unremarkable. The gallbladder shows no gallstones or  wall thickening.  2. There is a partially exophytic solid-appearing mass involving the  upper pole of the left kidney measuring 1.7 x 1.7 x  2.4 cm. This is  highly suspicious for a renal cell carcinoma. The right kidney is  unremarkable.  3. There is no aortic aneurysm or retroperitoneal lymphadenopathy. There  appears to be slight narrowing at the origin of the celiac artery and  there is also very prominent calcification involving the superior  mesenteric artery resulting in moderate segmental narrowing.  4. The large and small bowel loops are normal in caliber. There is no  evidence of obstruction or inflammatory change. The appendix appears  normal. In the pelvis there is enlargement of the prostate measuring 6.5  cm.            Radiation dose reduction techniques were utilized, including automated  exposure control and exposure modulation based on body size.    This report was finalized on 4/1/2019 8:47 PM by Dr. Diaz Porter M.D.      Lab Review:   No results displayed because visit has over 200 results.        Recent labs reviewed and interpreted for clinical significance and application            Level of Care:           Ryan Milton MD  02/18/25  .

## 2025-02-18 NOTE — TELEPHONE ENCOUNTER
Caller: Grisel Cody    Relationship: Emergency Contact    Best call back number: [phone] [facility]    What is the best time to reach you: ANYTIME    Who are you requesting to speak with (clinical staff, provider,  specific staff member): CLINICAL        What was the call regarding: PT MEDICATION WAS CHANGED WHILE HE WAS IN HOSPITAL LAST WEEK.  THIS WEEK DURING DAY HE FEELS LIGHTHEADED AND DIZZY BP 's AND AT NIGHT HE IS WAKING UP WITH HEART RACING AND 's AND FEELS SICK.    CAN YOU ADJUST MEDICATION?  PLEASE CALL PT TO ADVISE

## 2025-02-19 NOTE — TELEPHONE ENCOUNTER
Patient is wanting to wait until tomorrow he going to have surgery in morning recommended if symptoms worsen or persist to go to ED .

## 2025-02-20 ENCOUNTER — HOSPITAL ENCOUNTER (INPATIENT)
Facility: HOSPITAL | Age: 85
LOS: 2 days | Discharge: HOSPICE | DRG: 668 | End: 2025-02-24
Attending: UROLOGY | Admitting: UROLOGY
Payer: MEDICARE

## 2025-02-20 ENCOUNTER — ANESTHESIA (OUTPATIENT)
Dept: PERIOP | Facility: HOSPITAL | Age: 85
End: 2025-02-20
Payer: MEDICARE

## 2025-02-20 ENCOUNTER — ANESTHESIA EVENT (OUTPATIENT)
Dept: PERIOP | Facility: HOSPITAL | Age: 85
End: 2025-02-20
Payer: MEDICARE

## 2025-02-20 DIAGNOSIS — N40.1 BPH WITH ELEVATED PSA AND LOWER URINARY TRACT SYMPTOMS: ICD-10-CM

## 2025-02-20 DIAGNOSIS — D49.4 BLADDER TUMOR: ICD-10-CM

## 2025-02-20 DIAGNOSIS — R97.20 BPH WITH ELEVATED PSA AND LOWER URINARY TRACT SYMPTOMS: ICD-10-CM

## 2025-02-20 DIAGNOSIS — C67.2 MALIGNANT NEOPLASM OF LATERAL WALL OF URINARY BLADDER: ICD-10-CM

## 2025-02-20 DIAGNOSIS — G45.9 TIA (TRANSIENT ISCHEMIC ATTACK): Primary | ICD-10-CM

## 2025-02-20 PROBLEM — C67.9 BLADDER CANCER: Status: ACTIVE | Noted: 2025-02-20

## 2025-02-20 LAB
ABO GROUP BLD: NORMAL
ALBUMIN SERPL-MCNC: 3.4 G/DL (ref 3.5–5.2)
ALBUMIN/GLOB SERPL: 1.7 G/DL
ALP SERPL-CCNC: 71 U/L (ref 39–117)
ALT SERPL W P-5'-P-CCNC: 13 U/L (ref 1–41)
ANION GAP SERPL CALCULATED.3IONS-SCNC: 10.6 MMOL/L (ref 5–15)
AST SERPL-CCNC: 17 U/L (ref 1–40)
BASOPHILS # BLD AUTO: 0.01 10*3/MM3 (ref 0–0.2)
BASOPHILS NFR BLD AUTO: 0.1 % (ref 0–1.5)
BILIRUB SERPL-MCNC: <0.2 MG/DL (ref 0–1.2)
BLD GP AB SCN SERPL QL: NEGATIVE
BUN SERPL-MCNC: 40 MG/DL (ref 8–23)
BUN/CREAT SERPL: 14.6 (ref 7–25)
CALCIUM SPEC-SCNC: 9 MG/DL (ref 8.6–10.5)
CHLORIDE SERPL-SCNC: 104 MMOL/L (ref 98–107)
CO2 SERPL-SCNC: 21.4 MMOL/L (ref 22–29)
CREAT SERPL-MCNC: 2.74 MG/DL (ref 0.76–1.27)
DEPRECATED RDW RBC AUTO: 41.7 FL (ref 37–54)
EGFRCR SERPLBLD CKD-EPI 2021: 21.9 ML/MIN/1.73
EOSINOPHIL # BLD AUTO: 0 10*3/MM3 (ref 0–0.4)
EOSINOPHIL NFR BLD AUTO: 0 % (ref 0.3–6.2)
ERYTHROCYTE [DISTWIDTH] IN BLOOD BY AUTOMATED COUNT: 12.7 % (ref 12.3–15.4)
GLOBULIN UR ELPH-MCNC: 2 GM/DL
GLUCOSE BLDC GLUCOMTR-MCNC: 176 MG/DL (ref 70–105)
GLUCOSE BLDC GLUCOMTR-MCNC: 194 MG/DL (ref 70–105)
GLUCOSE BLDC GLUCOMTR-MCNC: 208 MG/DL (ref 70–105)
GLUCOSE BLDC GLUCOMTR-MCNC: 241 MG/DL (ref 70–105)
GLUCOSE BLDC GLUCOMTR-MCNC: 381 MG/DL (ref 70–105)
GLUCOSE SERPL-MCNC: 368 MG/DL (ref 65–99)
HCT VFR BLD AUTO: 27 % (ref 37.5–51)
HGB BLD-MCNC: 8.7 G/DL (ref 13–17.7)
IMM GRANULOCYTES # BLD AUTO: 0.02 10*3/MM3 (ref 0–0.05)
IMM GRANULOCYTES NFR BLD AUTO: 0.2 % (ref 0–0.5)
LYMPHOCYTES # BLD AUTO: 0.55 10*3/MM3 (ref 0.7–3.1)
LYMPHOCYTES NFR BLD AUTO: 6.5 % (ref 19.6–45.3)
MCH RBC QN AUTO: 28.8 PG (ref 26.6–33)
MCHC RBC AUTO-ENTMCNC: 32.2 G/DL (ref 31.5–35.7)
MCV RBC AUTO: 89.4 FL (ref 79–97)
MONOCYTES # BLD AUTO: 0.48 10*3/MM3 (ref 0.1–0.9)
MONOCYTES NFR BLD AUTO: 5.6 % (ref 5–12)
NEUTROPHILS NFR BLD AUTO: 7.45 10*3/MM3 (ref 1.7–7)
NEUTROPHILS NFR BLD AUTO: 87.6 % (ref 42.7–76)
NRBC BLD AUTO-RTO: 0 /100 WBC (ref 0–0.2)
PLATELET # BLD AUTO: 166 10*3/MM3 (ref 140–450)
PMV BLD AUTO: 10.7 FL (ref 6–12)
POTASSIUM SERPL-SCNC: 4.9 MMOL/L (ref 3.5–5.2)
PROT SERPL-MCNC: 5.4 G/DL (ref 6–8.5)
RBC # BLD AUTO: 3.02 10*6/MM3 (ref 4.14–5.8)
RH BLD: NEGATIVE
SODIUM SERPL-SCNC: 136 MMOL/L (ref 136–145)
T&S EXPIRATION DATE: NORMAL
WBC NRBC COR # BLD AUTO: 8.51 10*3/MM3 (ref 3.4–10.8)

## 2025-02-20 PROCEDURE — A9270 NON-COVERED ITEM OR SERVICE: HCPCS | Performed by: UROLOGY

## 2025-02-20 PROCEDURE — 0T9B70Z DRAINAGE OF BLADDER WITH DRAINAGE DEVICE, VIA NATURAL OR ARTIFICIAL OPENING: ICD-10-PCS | Performed by: UROLOGY

## 2025-02-20 PROCEDURE — 82948 REAGENT STRIP/BLOOD GLUCOSE: CPT

## 2025-02-20 PROCEDURE — 88307 TISSUE EXAM BY PATHOLOGIST: CPT | Performed by: UROLOGY

## 2025-02-20 PROCEDURE — 63710000001 INSULIN REGULAR HUMAN PER 5 UNITS: Performed by: ANESTHESIOLOGY

## 2025-02-20 PROCEDURE — 25010000002 ONDANSETRON PER 1 MG

## 2025-02-20 PROCEDURE — 25810000003 SODIUM CHLORIDE 0.9 % SOLUTION: Performed by: ANESTHESIOLOGY

## 2025-02-20 PROCEDURE — 25010000002 DEXAMETHASONE PER 1 MG

## 2025-02-20 PROCEDURE — 63710000001 HYDROCODONE-ACETAMINOPHEN 7.5-325 MG TABLET: Performed by: UROLOGY

## 2025-02-20 PROCEDURE — 0TBB8ZZ EXCISION OF BLADDER, VIA NATURAL OR ARTIFICIAL OPENING ENDOSCOPIC: ICD-10-PCS | Performed by: UROLOGY

## 2025-02-20 PROCEDURE — 94664 DEMO&/EVAL PT USE INHALER: CPT

## 2025-02-20 PROCEDURE — 94799 UNLISTED PULMONARY SVC/PX: CPT

## 2025-02-20 PROCEDURE — 86900 BLOOD TYPING SEROLOGIC ABO: CPT

## 2025-02-20 PROCEDURE — 94761 N-INVAS EAR/PLS OXIMETRY MLT: CPT

## 2025-02-20 PROCEDURE — 86850 RBC ANTIBODY SCREEN: CPT | Performed by: ANESTHESIOLOGY

## 2025-02-20 PROCEDURE — 25010000002 CEFAZOLIN PER 500 MG: Performed by: UROLOGY

## 2025-02-20 PROCEDURE — 85025 COMPLETE CBC W/AUTO DIFF WBC: CPT | Performed by: NURSE PRACTITIONER

## 2025-02-20 PROCEDURE — 25010000002 FENTANYL CITRATE (PF) 50 MCG/ML SOLUTION

## 2025-02-20 PROCEDURE — 25810000003 SODIUM CHLORIDE 0.9 % SOLUTION: Performed by: UROLOGY

## 2025-02-20 PROCEDURE — 86900 BLOOD TYPING SEROLOGIC ABO: CPT | Performed by: ANESTHESIOLOGY

## 2025-02-20 PROCEDURE — 25010000002 FENTANYL CITRATE (PF) 100 MCG/2ML SOLUTION

## 2025-02-20 PROCEDURE — 25010000002 SUGAMMADEX 200 MG/2ML SOLUTION: Performed by: NURSE ANESTHETIST, CERTIFIED REGISTERED

## 2025-02-20 PROCEDURE — 86901 BLOOD TYPING SEROLOGIC RH(D): CPT | Performed by: ANESTHESIOLOGY

## 2025-02-20 PROCEDURE — 63710000001 ATORVASTATIN 20 MG TABLET: Performed by: UROLOGY

## 2025-02-20 PROCEDURE — 86901 BLOOD TYPING SEROLOGIC RH(D): CPT

## 2025-02-20 PROCEDURE — 94640 AIRWAY INHALATION TREATMENT: CPT

## 2025-02-20 PROCEDURE — 25010000002 LIDOCAINE PF 2% 2 % SOLUTION

## 2025-02-20 PROCEDURE — 3E1K78Z IRRIGATION OF GENITOURINARY TRACT USING IRRIGATING SUBSTANCE, VIA NATURAL OR ARTIFICIAL OPENING: ICD-10-PCS | Performed by: UROLOGY

## 2025-02-20 PROCEDURE — 80053 COMPREHEN METABOLIC PANEL: CPT | Performed by: NURSE PRACTITIONER

## 2025-02-20 PROCEDURE — 82948 REAGENT STRIP/BLOOD GLUCOSE: CPT | Performed by: ANESTHESIOLOGY

## 2025-02-20 PROCEDURE — G0378 HOSPITAL OBSERVATION PER HR: HCPCS

## 2025-02-20 PROCEDURE — 25010000002 PROPOFOL 10 MG/ML EMULSION

## 2025-02-20 PROCEDURE — 25810000003 SODIUM CHLORIDE 0.9 % SOLUTION

## 2025-02-20 RX ORDER — FENTANYL CITRATE 50 UG/ML
25 INJECTION, SOLUTION INTRAMUSCULAR; INTRAVENOUS
Status: DISCONTINUED | OUTPATIENT
Start: 2025-02-20 | End: 2025-02-20 | Stop reason: HOSPADM

## 2025-02-20 RX ORDER — NALOXONE HCL 0.4 MG/ML
0.4 VIAL (ML) INJECTION
Status: DISCONTINUED | OUTPATIENT
Start: 2025-02-20 | End: 2025-02-21

## 2025-02-20 RX ORDER — FENTANYL CITRATE 50 UG/ML
INJECTION, SOLUTION INTRAMUSCULAR; INTRAVENOUS AS NEEDED
Status: DISCONTINUED | OUTPATIENT
Start: 2025-02-20 | End: 2025-02-20 | Stop reason: SURG

## 2025-02-20 RX ORDER — NALOXONE HCL 0.4 MG/ML
0.4 VIAL (ML) INJECTION AS NEEDED
Status: DISCONTINUED | OUTPATIENT
Start: 2025-02-20 | End: 2025-02-20 | Stop reason: HOSPADM

## 2025-02-20 RX ORDER — ACETAMINOPHEN 325 MG/1
650 TABLET ORAL ONCE AS NEEDED
Status: DISCONTINUED | OUTPATIENT
Start: 2025-02-20 | End: 2025-02-20 | Stop reason: HOSPADM

## 2025-02-20 RX ORDER — MINOXIDIL 2.5 MG/1
2.5 TABLET ORAL
Status: DISCONTINUED | OUTPATIENT
Start: 2025-02-20 | End: 2025-02-24 | Stop reason: HOSPADM

## 2025-02-20 RX ORDER — LABETALOL HYDROCHLORIDE 5 MG/ML
5 INJECTION, SOLUTION INTRAVENOUS
Status: DISCONTINUED | OUTPATIENT
Start: 2025-02-20 | End: 2025-02-20 | Stop reason: HOSPADM

## 2025-02-20 RX ORDER — PANTOPRAZOLE SODIUM 40 MG/1
40 TABLET, DELAYED RELEASE ORAL DAILY
Status: DISCONTINUED | OUTPATIENT
Start: 2025-02-20 | End: 2025-02-24 | Stop reason: HOSPADM

## 2025-02-20 RX ORDER — HYDROCODONE BITARTRATE AND ACETAMINOPHEN 7.5; 325 MG/1; MG/1
1 TABLET ORAL EVERY 4 HOURS PRN
Status: DISCONTINUED | OUTPATIENT
Start: 2025-02-20 | End: 2025-02-21

## 2025-02-20 RX ORDER — ALBUTEROL SULFATE 0.83 MG/ML
2.5 SOLUTION RESPIRATORY (INHALATION) ONCE AS NEEDED
Status: COMPLETED | OUTPATIENT
Start: 2025-02-20 | End: 2025-02-20

## 2025-02-20 RX ORDER — ONDANSETRON 4 MG/1
4 TABLET, ORALLY DISINTEGRATING ORAL EVERY 6 HOURS PRN
Status: DISCONTINUED | OUTPATIENT
Start: 2025-02-20 | End: 2025-02-24 | Stop reason: HOSPADM

## 2025-02-20 RX ORDER — PHENYLEPHRINE HCL IN 0.9% NACL 1 MG/10 ML
SYRINGE (ML) INTRAVENOUS AS NEEDED
Status: DISCONTINUED | OUTPATIENT
Start: 2025-02-20 | End: 2025-02-20 | Stop reason: SURG

## 2025-02-20 RX ORDER — CARVEDILOL 25 MG/1
25 TABLET ORAL 2 TIMES DAILY WITH MEALS
Status: DISCONTINUED | OUTPATIENT
Start: 2025-02-20 | End: 2025-02-23

## 2025-02-20 RX ORDER — ISOSORBIDE MONONITRATE 30 MG/1
30 TABLET, EXTENDED RELEASE ORAL
Status: DISCONTINUED | OUTPATIENT
Start: 2025-02-20 | End: 2025-02-24 | Stop reason: HOSPADM

## 2025-02-20 RX ORDER — CEPHALEXIN 500 MG/1
500 CAPSULE ORAL 3 TIMES DAILY
Qty: 15 CAPSULE | Refills: 0 | Status: SHIPPED | OUTPATIENT
Start: 2025-02-20 | End: 2025-02-25

## 2025-02-20 RX ORDER — AMLODIPINE BESYLATE 5 MG/1
5 TABLET ORAL 2 TIMES DAILY
Status: DISCONTINUED | OUTPATIENT
Start: 2025-02-20 | End: 2025-02-24 | Stop reason: HOSPADM

## 2025-02-20 RX ORDER — FENTANYL CITRATE 50 UG/ML
50 INJECTION, SOLUTION INTRAMUSCULAR; INTRAVENOUS
Status: DISCONTINUED | OUTPATIENT
Start: 2025-02-20 | End: 2025-02-20 | Stop reason: HOSPADM

## 2025-02-20 RX ORDER — PROPOFOL 10 MG/ML
VIAL (ML) INTRAVENOUS AS NEEDED
Status: DISCONTINUED | OUTPATIENT
Start: 2025-02-20 | End: 2025-02-20 | Stop reason: SURG

## 2025-02-20 RX ORDER — HYDRALAZINE HYDROCHLORIDE 20 MG/ML
5 INJECTION INTRAMUSCULAR; INTRAVENOUS
Status: DISCONTINUED | OUTPATIENT
Start: 2025-02-20 | End: 2025-02-20 | Stop reason: HOSPADM

## 2025-02-20 RX ORDER — ATORVASTATIN CALCIUM 20 MG/1
20 TABLET, FILM COATED ORAL NIGHTLY
Status: DISCONTINUED | OUTPATIENT
Start: 2025-02-20 | End: 2025-02-22

## 2025-02-20 RX ORDER — ONDANSETRON 2 MG/ML
INJECTION INTRAMUSCULAR; INTRAVENOUS AS NEEDED
Status: DISCONTINUED | OUTPATIENT
Start: 2025-02-20 | End: 2025-02-20 | Stop reason: SURG

## 2025-02-20 RX ORDER — SUCRALFATE 1 G/1
1 TABLET ORAL EVERY OTHER DAY
Status: DISCONTINUED | OUTPATIENT
Start: 2025-02-20 | End: 2025-02-24 | Stop reason: HOSPADM

## 2025-02-20 RX ORDER — SODIUM CHLORIDE 9 MG/ML
50 INJECTION, SOLUTION INTRAVENOUS ONCE
Status: COMPLETED | OUTPATIENT
Start: 2025-02-20 | End: 2025-02-20

## 2025-02-20 RX ORDER — HYDRALAZINE HYDROCHLORIDE 10 MG/1
10 TABLET, FILM COATED ORAL 3 TIMES DAILY
Status: DISCONTINUED | OUTPATIENT
Start: 2025-02-20 | End: 2025-02-24 | Stop reason: HOSPADM

## 2025-02-20 RX ORDER — OXYCODONE AND ACETAMINOPHEN 5; 325 MG/1; MG/1
1 TABLET ORAL EVERY 6 HOURS PRN
Qty: 10 TABLET | Refills: 0 | Status: SHIPPED | OUTPATIENT
Start: 2025-02-20

## 2025-02-20 RX ORDER — ETOMIDATE 2 MG/ML
INJECTION INTRAVENOUS AS NEEDED
Status: DISCONTINUED | OUTPATIENT
Start: 2025-02-20 | End: 2025-02-20 | Stop reason: SURG

## 2025-02-20 RX ORDER — ACETAMINOPHEN 325 MG/1
650 TABLET ORAL EVERY 4 HOURS PRN
Status: DISCONTINUED | OUTPATIENT
Start: 2025-02-20 | End: 2025-02-24 | Stop reason: HOSPADM

## 2025-02-20 RX ORDER — SODIUM CHLORIDE 9 MG/ML
100 INJECTION, SOLUTION INTRAVENOUS CONTINUOUS
Status: DISPENSED | OUTPATIENT
Start: 2025-02-20 | End: 2025-02-21

## 2025-02-20 RX ORDER — SODIUM CHLORIDE 0.9 % (FLUSH) 0.9 %
10 SYRINGE (ML) INJECTION AS NEEDED
Status: DISCONTINUED | OUTPATIENT
Start: 2025-02-20 | End: 2025-02-20 | Stop reason: HOSPADM

## 2025-02-20 RX ORDER — DEXAMETHASONE SODIUM PHOSPHATE 4 MG/ML
INJECTION, SOLUTION INTRA-ARTICULAR; INTRALESIONAL; INTRAMUSCULAR; INTRAVENOUS; SOFT TISSUE AS NEEDED
Status: DISCONTINUED | OUTPATIENT
Start: 2025-02-20 | End: 2025-02-20 | Stop reason: SURG

## 2025-02-20 RX ORDER — CETIRIZINE HYDROCHLORIDE 10 MG/1
10 TABLET ORAL DAILY
Status: DISCONTINUED | OUTPATIENT
Start: 2025-02-20 | End: 2025-02-24 | Stop reason: HOSPADM

## 2025-02-20 RX ORDER — ACETAMINOPHEN 650 MG/1
650 SUPPOSITORY RECTAL EVERY 4 HOURS PRN
Status: DISCONTINUED | OUTPATIENT
Start: 2025-02-20 | End: 2025-02-24 | Stop reason: HOSPADM

## 2025-02-20 RX ORDER — ONDANSETRON 2 MG/ML
4 INJECTION INTRAMUSCULAR; INTRAVENOUS ONCE AS NEEDED
Status: COMPLETED | OUTPATIENT
Start: 2025-02-20 | End: 2025-02-20

## 2025-02-20 RX ORDER — LIDOCAINE HYDROCHLORIDE 10 MG/ML
0.5 INJECTION, SOLUTION EPIDURAL; INFILTRATION; INTRACAUDAL; PERINEURAL ONCE AS NEEDED
Status: DISCONTINUED | OUTPATIENT
Start: 2025-02-20 | End: 2025-02-20 | Stop reason: HOSPADM

## 2025-02-20 RX ORDER — SODIUM CHLORIDE 9 MG/ML
INJECTION, SOLUTION INTRAVENOUS CONTINUOUS PRN
Status: DISCONTINUED | OUTPATIENT
Start: 2025-02-20 | End: 2025-02-20 | Stop reason: SURG

## 2025-02-20 RX ORDER — LIDOCAINE HYDROCHLORIDE 20 MG/ML
INJECTION, SOLUTION EPIDURAL; INFILTRATION; INTRACAUDAL; PERINEURAL AS NEEDED
Status: DISCONTINUED | OUTPATIENT
Start: 2025-02-20 | End: 2025-02-20 | Stop reason: SURG

## 2025-02-20 RX ORDER — ONDANSETRON 2 MG/ML
4 INJECTION INTRAMUSCULAR; INTRAVENOUS EVERY 6 HOURS PRN
Status: DISCONTINUED | OUTPATIENT
Start: 2025-02-20 | End: 2025-02-24 | Stop reason: HOSPADM

## 2025-02-20 RX ORDER — ROCURONIUM BROMIDE 10 MG/ML
INJECTION, SOLUTION INTRAVENOUS AS NEEDED
Status: DISCONTINUED | OUTPATIENT
Start: 2025-02-20 | End: 2025-02-20 | Stop reason: SURG

## 2025-02-20 RX ADMIN — FENTANYL CITRATE 50 MCG: 50 INJECTION, SOLUTION INTRAMUSCULAR; INTRAVENOUS at 16:27

## 2025-02-20 RX ADMIN — ALBUTEROL SULFATE 2.5 MG: 2.5 SOLUTION RESPIRATORY (INHALATION) at 16:45

## 2025-02-20 RX ADMIN — SODIUM CHLORIDE 100 ML/HR: 9 INJECTION, SOLUTION INTRAVENOUS at 17:52

## 2025-02-20 RX ADMIN — ROCURONIUM BROMIDE 10 MG: 10 INJECTION, SOLUTION INTRAVENOUS at 12:52

## 2025-02-20 RX ADMIN — HYDROCODONE BITARTRATE AND ACETAMINOPHEN 1 TABLET: 7.5; 325 TABLET ORAL at 21:53

## 2025-02-20 RX ADMIN — SODIUM CHLORIDE: 9 INJECTION, SOLUTION INTRAVENOUS at 12:09

## 2025-02-20 RX ADMIN — ONDANSETRON 4 MG: 2 INJECTION, SOLUTION INTRAMUSCULAR; INTRAVENOUS at 14:20

## 2025-02-20 RX ADMIN — SUGAMMADEX 400 MG: 100 INJECTION, SOLUTION INTRAVENOUS at 13:36

## 2025-02-20 RX ADMIN — PROPOFOL 40 MG: 10 INJECTION, EMULSION INTRAVENOUS at 12:15

## 2025-02-20 RX ADMIN — ONDANSETRON 4 MG: 2 INJECTION, SOLUTION INTRAMUSCULAR; INTRAVENOUS at 13:09

## 2025-02-20 RX ADMIN — FENTANYL CITRATE 50 MCG: 50 INJECTION, SOLUTION INTRAMUSCULAR; INTRAVENOUS at 14:20

## 2025-02-20 RX ADMIN — SODIUM CHLORIDE 100 ML/HR: 9 INJECTION, SOLUTION INTRAVENOUS at 21:53

## 2025-02-20 RX ADMIN — PROPOFOL 30 MG: 10 INJECTION, EMULSION INTRAVENOUS at 13:45

## 2025-02-20 RX ADMIN — SODIUM CHLORIDE 50 ML/HR: 9 INJECTION, SOLUTION INTRAVENOUS at 11:01

## 2025-02-20 RX ADMIN — ROCURONIUM BROMIDE 10 MG: 10 INJECTION, SOLUTION INTRAVENOUS at 13:23

## 2025-02-20 RX ADMIN — LIDOCAINE HYDROCHLORIDE 100 MG: 20 INJECTION, SOLUTION EPIDURAL; INFILTRATION; INTRACAUDAL; PERINEURAL at 12:15

## 2025-02-20 RX ADMIN — FENTANYL CITRATE 25 MCG: 50 INJECTION, SOLUTION INTRAMUSCULAR; INTRAVENOUS at 12:31

## 2025-02-20 RX ADMIN — DEXAMETHASONE SODIUM PHOSPHATE 4 MG: 4 INJECTION, SOLUTION INTRAMUSCULAR; INTRAVENOUS at 12:27

## 2025-02-20 RX ADMIN — INSULIN HUMAN 6 UNITS: 100 INJECTION, SOLUTION PARENTERAL at 10:20

## 2025-02-20 RX ADMIN — LIDOCAINE HYDROCHLORIDE 100 MG: 20 INJECTION, SOLUTION EPIDURAL; INFILTRATION; INTRACAUDAL; PERINEURAL at 13:47

## 2025-02-20 RX ADMIN — ATORVASTATIN CALCIUM 20 MG: 20 TABLET, FILM COATED ORAL at 20:25

## 2025-02-20 RX ADMIN — FENTANYL CITRATE 25 MCG: 50 INJECTION, SOLUTION INTRAMUSCULAR; INTRAVENOUS at 12:27

## 2025-02-20 RX ADMIN — ROCURONIUM BROMIDE 20 MG: 10 INJECTION, SOLUTION INTRAVENOUS at 12:31

## 2025-02-20 RX ADMIN — CEFAZOLIN 1000 MG: 1 INJECTION, POWDER, FOR SOLUTION INTRAMUSCULAR; INTRAVENOUS at 23:49

## 2025-02-20 RX ADMIN — ETOMIDATE 10 MG: 2 INJECTION INTRAVENOUS at 12:15

## 2025-02-20 RX ADMIN — CEFAZOLIN 2000 MG: 2 INJECTION, POWDER, FOR SOLUTION INTRAMUSCULAR; INTRAVENOUS at 11:59

## 2025-02-20 RX ADMIN — FENTANYL CITRATE 25 MCG: 50 INJECTION, SOLUTION INTRAMUSCULAR; INTRAVENOUS at 12:40

## 2025-02-20 RX ADMIN — ROCURONIUM BROMIDE 10 MG: 10 INJECTION, SOLUTION INTRAVENOUS at 13:10

## 2025-02-20 RX ADMIN — Medication 100 MCG: at 12:15

## 2025-02-20 RX ADMIN — FENTANYL CITRATE 50 MCG: 50 INJECTION, SOLUTION INTRAMUSCULAR; INTRAVENOUS at 15:19

## 2025-02-20 RX ADMIN — ROCURONIUM BROMIDE 50 MG: 10 INJECTION, SOLUTION INTRAVENOUS at 12:15

## 2025-02-20 RX ADMIN — FENTANYL CITRATE 25 MCG: 50 INJECTION, SOLUTION INTRAMUSCULAR; INTRAVENOUS at 12:49

## 2025-02-20 NOTE — OP NOTE
CYSTOSCOPY TRANSURETHRAL RESECTION OF BLADDER TUMOR  Procedure Report    Patient Name:  Dave Peguero  YOB: 1940    Date of Surgery:  2/20/2025     Indications: 84-year-old gentleman with gross hematuria secondary to a large bladder tumor.  He now presents for treatment of this.    Pre-op Diagnosis:   Bladder tumor [D49.4]  BPH with elevated PSA and lower urinary tract symptoms [N40.1, R97.20]       Post-Op Diagnosis Codes:     * Bladder tumor [D49.4]     * BPH with elevated PSA and lower urinary tract symptoms [N40.1, R97.20]    Procedure/CPT® Codes:  DE CYSTOURETHROSCOPY W/DEST &/RMVL TUMOR LARGE [23036]    Procedure(s):  CYSTOSCOPY TRANSURETHRAL RESECTION OF BLADDER TUMOR, large    Staff:  Surgeon(s):  Kevin Rodriguez MD            was responsible for performing the following activities:  None  and their skilled assistance was necessary for the success of this case.    Anesthesia: General    Estimated Blood Loss:  30 mL    Implants:    Nothing was implanted during the procedure    Specimen:          ID Type Source Tests Collected by Time   A : SUPERFICIAL BLADDER TUMOR Tissue Urinary Bladder TISSUE PATHOLOGY EXAM Kevin Rodriguez MD 2/20/2025 1256   B : DEEP BLADDER TUMOR Tissue Urinary Bladder TISSUE PATHOLOGY EXAM Kevin Rodriguez MD 2/20/2025 1333         Findings: Bladder tumor greater than 5 cm on the right lateral wall.  This was overlying the ureteral orifice but once it had been resected ureter office was completely uninvolved.  There was also median 3 cm tumor on the left lateral wall.    Complications: None    Description of Procedure: Patient induced with general anesthesia and placed in dorsolithotomy position.  Prepped and draped in sterile fashion.  26 Nauruan continuous-flow resectoscope was placed under direct vision.  Urethra is within normal limits.  Prostate shows some apical tissue but it has been resected in the past and is actually fairly wide open.  Large bladder tumor  from the right wall greater than 5 cm in tumor.  Because of this I could not see the ureteral orifices and actually at the penectomy I could not see the tumor on the left side because the right-sided bladder tumor was filling out bladder.  Tumor was resected down to just above the bladder wall.  The other tumors on the left side was also resected and all of these chips were removed and sent as superficial bladder tumor.  Resection was then performed of the base of both tumors until only normal tissue was seen.  No evidence of residual cancer was seen.  Hemostasis was obtained using cautery.  Both ureteral orifices were visualized at the end of the procedure and they were intact.  The resectoscope was removed and a 24 Spanish three-way Nolen catheter was placed.  The patient was taken out of the dorsolithotomy position and awakened from general anesthesia.  He was transported to the postanesthesia care unit stable condition having Toller the procedure well without any complications.      Kevin Rodriguez MD     Date: 2/20/2025  Time: 13:49 EST

## 2025-02-20 NOTE — ANESTHESIA POSTPROCEDURE EVALUATION
Patient: Dave Peguero    Procedure Summary       Date: 02/20/25 Room / Location: [facility] OR 05 / [facility] MAIN OR    Anesthesia Start: 1209 Anesthesia Stop: 1402    Procedure: CYSTOSCOPY TRANSURETHRAL RESECTION OF BLADDER TUMOR Diagnosis:       Bladder tumor      BPH with elevated PSA and lower urinary tract symptoms      (Bladder tumor [D49.4])      (BPH with elevated PSA and lower urinary tract symptoms [N40.1, R97.20])    Surgeons: Kevin Rodriguez MD Provider: Chaz Cash MD    Anesthesia Type: general ASA Status: 3            Anesthesia Type: general    Vitals  Vitals Value Taken Time   /64 02/20/25 1703   Temp 97.7 °F (36.5 °C) 02/20/25 1703   Pulse 87 02/20/25 1704   Resp 16 02/20/25 1703   SpO2 92 % 02/20/25 1704   Vitals shown include unfiled device data.        Post Anesthesia Care and Evaluation    Patient location during evaluation: PACU  Patient participation: complete - patient participated  Level of consciousness: awake  Pain scale: See nurse's notes for pain score.  Pain management: adequate    Airway patency: patent  Anesthetic complications: No anesthetic complications  PONV Status: none  Cardiovascular status: acceptable  Respiratory status: acceptable and spontaneous ventilation  Hydration status: acceptable    Comments: Patient seen and examined postoperatively; vital signs stable; SpO2 greater than or equal to 90%; cardiopulmonary status stable; nausea/vomiting adequately controlled; pain adequately controlled; no apparent anesthesia complications; patient discharged from anesthesia care when discharge criteria were met

## 2025-02-20 NOTE — H&P
Urology History and Physical    Patient:Dave Peguero  :1940   Room:[facility] MAIN OR/MAIN OR   Admit Date2025  Age:84 y.o.      SEX:male      DOS:2025      MR:[id number]      Visit:[id number]       Chief complaint bladder tumor    Subjective     Patient is a 84 y.o. male who presents with 6-month history of gross hematuria.  Patient underwent evaluation in November which revealed a 5 cm mass in his bladder on the right side of the trigone obscuring the ureteral orifice.  Patient needed cardiac clearance or to hold his anticoagulation and that is why his resection has been delayed. 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     Atherosclerosis of native arteries of extremities with intermittent claudication, left leg     Cancer     breast cancer hx    Carotid artery stenosis     Cerebral vascular disease     CHF (congestive heart failure)     Coronary artery disease     Diabetes mellitus     type 1    Hyperlipidemia     Hypertension     Kidney stone     Myocardial infarction     x 3     Past Surgical History:   Procedure Laterality Date    CARDIAC CATHETERIZATION      CORONARY ARTERY BYPASS GRAFT      TRIPLE    DENTAL PROCEDURE      all teeth have been pulled    FEMORAL ARTERY STENT Bilateral     JOINT REPLACEMENT Right     knee    PROSTATE SURGERY       Social History     Socioeconomic History    Marital status:    Tobacco Use    Smoking status: Former     Current packs/day: 0.50     Types: Cigarettes    Smokeless tobacco: Never   Vaping Use    Vaping status: Never Used   Substance and Sexual Activity    Alcohol use: No    Drug use: No    Sexual activity: Defer     Family History   Problem Relation Age of Onset    COPD Mother     Heart  disease Mother     Stroke Mother     Hypertension Mother     Cancer Father      Allergy  Allergies   Allergen Reactions    Benadryl [Diphenhydramine] Unknown (See Comments)     Unknown allergy, but daughter is also allergic and states it causes throat swelling with her.     Prior to Admission medications    Medication Sig Start Date End Date Taking? Authorizing Provider   amLODIPine (NORVASC) 5 MG tablet Take 1 tablet by mouth 2 (Two) Times a Day. 24  Yes Sonal Harman MD   aspirin 81 MG EC tablet Take 1 tablet by mouth Daily.   Yes Sonal Harman MD   atorvastatin (LIPITOR) 20 MG tablet Take 1 tablet by mouth Every Evening.   Yes Sonal Harman MD   calcium carbonate (OS-RUTH) 600 MG tablet Take 1 tablet by mouth 2 (Two) Times a Day With Meals.   Yes Sonal Harman MD   carvedilol (COREG) 25 MG tablet Take 1 tablet by mouth 2 (Two) Times a Day With Meals. 25  Yes Brammell, Timothy Duane, MD   cetirizine (zyrTEC) 10 MG tablet Take 1 tablet by mouth Daily. 5/10/17  Yes Franc Lira Jr., MD   clopidogrel (PLAVIX) 75 MG tablet Take 1 tablet by mouth Daily.   Yes Sonal Harman MD   hydrALAZINE (APRESOLINE) 10 MG tablet Take 1 tablet by mouth 3 (Three) Times a Day. 25  Yes Brammell, Timothy Duane, MD   isosorbide mononitrate (IMDUR) 30 MG 24 hr tablet Take 1 tablet by mouth Daily. 25  Yes Brammell, Timothy Duane, MD   minoxidil (LONITEN) 2.5 MG tablet Take 1 tablet by mouth Daily. 25  Yes Brammell, Timothy Duane, MD   pantoprazole (PROTONIX) 40 MG EC tablet Take 1 tablet by mouth Daily.   Yes Sonal Harman MD   sucralfate (CARAFATE) 1 g tablet Take 1 tablet by mouth Every Other Day.   Yes Sonal Harman MD         Objective     tMax 24 hours:  Temp (24hrs), Av.2 °F (36.8 °C), Min:98.2 °F (36.8 °C), Max:98.2 °F (36.8 °C)    Vital Sign Ranges:  Temp:  [98.2 °F (36.8 °C)] 98.2 °F (36.8 °C)  Heart Rate:  [79] 79  Resp:  [18] 18  BP:  "(160)/(63) 160/63  Intake and Output Last 3 Shifts:  No intake/output data recorded.    Physical Exam:   General Appearance alert, appears stated age, and cooperative  Head normocephalic, without obvious abnormality and atraumatic  Lungs respirations regular, respirations even, and respirations unlabored  Heart regular rhythm & normal rate  Abdomen soft non-tender, no guarding, and no rebound tenderness  Skin no bleeding, bruising or rash  Neurologic Mental Status orientated to person, place, time and situation    Results Review:     Lab Results (last 24 hours)       Procedure Component Value Units Date/Time    POC Glucose STAT [[id number]]  (Abnormal) Collected: 02/20/25 1142    Specimen: Blood Updated: 02/20/25 1145     Glucose 208 mg/dL      Comment: Serial Number: [id number]:  [id number]       POC Glucose Once [[id number]]  (Abnormal) Collected: 02/20/25 1000    Specimen: Blood Updated: 02/20/25 1004     Glucose 241 mg/dL      Comment: Serial Number: [id number]:  [id number]              No results found for: \"URINECX\"     Imaging Results (Last 7 Days)       ** No results found for the last 168 hours. **            Inpatient Meds:   Scheduled Meds:ceFAZolin 2000 mg IVPB in 100 mL NS (MBP), 2,000 mg, Intravenous, Once       Continuous Infusions:    PRN Meds:.  lidocaine PF 1%    sodium chloride      Assessment & Plan     Active Problems:    * No active hospital problems. *    Large bladder mass  Gross hematuria  Coronary artery disease with anticoagulation which has been held    Plan  Cystoscopy, transurethral section of bladder tumor.  Risk, benefits, alternatives have been discussed with the patient and his family they wish to proceed.      I discussed the patient's findings and my recommendations with patient and family    Kevin Rodriguez MD  02/20/25  12:05 EST        "

## 2025-02-20 NOTE — CONSULTS
"[facility] Medicine Services  Consult Note    Patient Name: Dave Peguero  : 1940  MRN: [id number]  Primary Care Physician:  Fidel Le MD  Referring Physician: Kevin Rodriguez MD  Date of admission: 2025  Date and Time of Care: 25  at 5:44 PM EST     Inpatient Hospitalist Consult  Consult performed by: Jeanan Horn APRN  Consult ordered by: Kevin Rodriguez MD        Reason for Consult/ Chief Complaint: Medical management    Consult Requested By: Michael SHELDON    Subjective:     History of Present Illness:   Per the documentation by Kevin Rodriguez MD, dated 2025,  \"Patient is a 84 y.o. male who presents with 6-month history of gross hematuria.  Patient underwent evaluation in November which revealed a 5 cm mass in his bladder on the right side of the trigone obscuring the ureteral orifice.  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     Atherosclerosis of native arteries of extremities with intermittent claudication, left leg     Cancer     breast cancer hx    Carotid artery stenosis     Cerebral vascular disease     CHF (congestive heart failure)     Coronary artery disease     Diabetes mellitus     type 1    Hyperlipidemia     Hypertension     Kidney stone     Myocardial infarction     x 3       Past Surgical History:   Procedure Laterality Date    CARDIAC CATHETERIZATION      CORONARY ARTERY BYPASS GRAFT      TRIPLE    DENTAL PROCEDURE      all teeth have been pulled    FEMORAL ARTERY STENT Bilateral     JOINT REPLACEMENT Right     knee    PROSTATE SURGERY         Family History: family history includes COPD in his mother; Cancer in his " father; Heart disease in his mother; Hypertension in his mother; Stroke in his mother. Otherwise pertinent FHx was reviewed and not pertinent to current issue.    Social History:  reports that he has quit smoking. His smoking use included cigarettes. He has never used smokeless tobacco. He reports that he does not drink alcohol and does not use drugs.    Home Medications:   amLODIPine, aspirin, atorvastatin, calcium carbonate, carvedilol, cephalexin, cetirizine, clopidogrel, hydrALAZINE, isosorbide mononitrate, minoxidil, oxyCODONE-acetaminophen, pantoprazole, and sucralfate    Allergies:  Allergies   Allergen Reactions    Benadryl [Diphenhydramine] Unknown (See Comments)     Unknown allergy, but daughter is also allergic and states it causes throat swelling with her.         Objective:     Vital Signs  Temp:  [97.7 °F (36.5 °C)-98.2 °F (36.8 °C)] 97.7 °F (36.5 °C)  Heart Rate:  [65-92] 92  Resp:  [14-21] 16  BP: (148-185)/(54-76) 164/67  Flow (L/min) (Oxygen Therapy):  [4-6] 4   Body mass index is 29.68 kg/m².    Physical Exam  PHYSICAL EXAM  Constitutional:  Well-developed, well-nourished, no acute distress, nontoxic appearance   Eyes:  PERRL, conjunctivae normal, EOMI   HENT:  Atraumatic, external ears normal, nose normal, oropharynx moist, no pharyngeal exudates. Neck-normal range of motion, no tenderness, supple, trachea midline  Respiratory:  ctab, nonlabored respirations without accessory muscle use  Cardiovascular:  Normal rate, normal rhythm, no murmurs, no gallops, no rubs   GI:  Soft, nondistended, normal bowel sounds, nontender, no organomegaly, no mass, no rebound, no guarding   :  No costovertebral angle tenderness   Integument:  Well hydrated, no rash   Lymphatic:  No lymphadenopathy noted   Neurologic:  Alert & oriented x 3, CN 2-12 normal, normal motor function, normal sensory function, no focal deficits noted   Psychiatric:  Speech and behavior appropriate      Scheduled Meds   amLODIPine, 5 mg,  Oral, BID  atorvastatin, 20 mg, Oral, Q PM  carvedilol, 25 mg, Oral, BID With Meals  ceFAZolin, 1,000 mg, Intravenous, Q12H  cetirizine, 10 mg, Oral, Daily  hydrALAZINE, 10 mg, Oral, TID  isosorbide mononitrate, 30 mg, Oral, Q24H  minoxidil, 2.5 mg, Oral, Q24H  pantoprazole, 40 mg, Oral, Daily  sucralfate, 1 g, Oral, Every Other Day       PRN Meds     acetaminophen **OR** acetaminophen    HYDROcodone-acetaminophen    Morphine **AND** naloxone    ondansetron ODT **OR** ondansetron   Infusions  sodium chloride, 100 mL/hr          Diagnostic Data          No radiology results for the last day      I reviewed the patient's new clinical results.    Assessment/Plan:     Active and Resolved Problems  Active Hospital Problems    Diagnosis  POA    **Bladder cancer [C67.9]  Yes      Resolved Hospital Problems   No resolved problems to display.       Bladder mass  -UA as of 2/10/2025: 21-50 RBCs, 6-10 WBCs, nitrite negative, bacteria negative  -Urine protein creatinine ratio 5, 969  -Postop day 0 of cystoscopy transurethral resection of bladder tumor  -Pain control  -CBC pending  -CMP pending    CKD  -CMP pending    CAD  PVD  HLD  -Continue statin, metoprolol.  Hold Plavix and ASA.    -Appreciate urology's recommendations on when to resume AC    DM2  -Accu-Cheks before meals and at bedtime  -SSI while inpatient  -CCD    Tobacco use  -Tobacco cessation  -Nicotine patch          VTE Prophylaxis:  Mechanical VTE prophylaxis orders are present.         Code status is   Code Status and Medical Interventions: No CPR (Do Not Attempt to Resuscitate); Limited Support; No intubation (DNI)   Ordered at: 02/20/25 3917     Medical Intervention Limits:    No intubation (DNI)     Level Of Support Discussed With:    Patient     Code Status (Patient has no pulse and is not breathing):    No CPR (Do Not Attempt to Resuscitate)     Medical Interventions (Patient has pulse or is breathing):    Limited Support       Plan for disposition:home in 3  days    Time: 30 minutes        Signature: Electronically signed by TOREY Pires, 02/20/25, 17:43 EST.  [facility] Hospitalist Team

## 2025-02-20 NOTE — ANESTHESIA PROCEDURE NOTES
Airway  Urgency: elective    Date/Time: 2/20/2025 12:18 PM  Airway not difficult    General Information and Staff    Patient location during procedure: OR  CRNA/CAA: Jana Ham CRNA    Indications and Patient Condition  Indications for airway management: airway protection    Preoxygenated: yes  MILS maintained throughout  Mask difficulty assessment: 2 - vent by mask + OA or adjuvant +/- NMBA    Final Airway Details  Final airway type: endotracheal airway      Successful airway: ETT  Cuffed: yes   Successful intubation technique: video laryngoscopy  Facilitating devices/methods: intubating stylet  Endotracheal tube insertion site: oral  Blade: Valdovinos  Blade size: 4  ETT size (mm): 7.5  Cormack-Lehane Classification: grade I - full view of glottis  Placement verified by: chest auscultation and capnometry   Measured from: lips  ETT/EBT  to lips (cm): 23  Number of attempts at approach: 1  Assessment: lips, teeth, and gum same as pre-op and atraumatic intubation

## 2025-02-20 NOTE — ANESTHESIA PREPROCEDURE EVALUATION
Anesthesia Evaluation     Patient summary reviewed and Nursing notes reviewed   NPO Solid Status: > 8 hours  NPO Liquid Status: > 8 hours           Airway   Mallampati: II  TM distance: >3 FB  Neck ROM: full  No difficulty expected  Dental - normal exam     Pulmonary - normal exam   (+) ,sleep apnea  Cardiovascular - normal exam    ECG reviewed    (+) hypertension, past MI , CAD, CABG, CHF , PVD, hyperlipidemia,  carotid artery disease      Neuro/Psych  GI/Hepatic/Renal/Endo    (+) renal disease- CRI, diabetes mellitus type 2    Musculoskeletal     Abdominal  - normal exam    Bowel sounds: normal.   Substance History      OB/GYN          Other   arthritis,     ROS/Med Hx Other: 2/13/25ECHO--  Left ventricular systolic function is normal. Left ventricular ejection fraction appears to be 61 - 65%.  ·  Left ventricular wall thickness is consistent with mild concentric hypertrophy.  ·  Left ventricular diastolic function is consistent with (grade I) impaired relaxation.  ·  There is calcification of the aortic valve.  ·  Peak velocity of the flow distal to the aortic valve is 269 cm/s. Aortic valve maximum pressure gradient is 29 mmHg. Aortic valve mean pressure gradient is 19 mmHg.  ·  Cannot exclude severe aortic stenosis.                Anesthesia Plan    ASA 3     general     intravenous induction     Anesthetic plan, risks, benefits, and alternatives have been provided, discussed and informed consent has been obtained with: patient.    Plan discussed with CRNA.    CODE STATUS:

## 2025-02-21 ENCOUNTER — APPOINTMENT (OUTPATIENT)
Dept: CT IMAGING | Facility: HOSPITAL | Age: 85
DRG: 668 | End: 2025-02-21
Payer: MEDICARE

## 2025-02-21 LAB
GLUCOSE BLDC GLUCOMTR-MCNC: 161 MG/DL (ref 70–105)
GLUCOSE BLDC GLUCOMTR-MCNC: 168 MG/DL (ref 70–105)
GLUCOSE BLDC GLUCOMTR-MCNC: 230 MG/DL (ref 70–105)
GLUCOSE BLDC GLUCOMTR-MCNC: 236 MG/DL (ref 70–105)
GLUCOSE BLDC GLUCOMTR-MCNC: 262 MG/DL (ref 70–105)
LAB AP CASE REPORT: NORMAL
LAB AP SYNOPTIC CHECKLIST: NORMAL
NT-PROBNP SERPL-MCNC: 7753 PG/ML (ref 0–1800)
PATH REPORT.FINAL DX SPEC: NORMAL
PATH REPORT.GROSS SPEC: NORMAL

## 2025-02-21 PROCEDURE — 63710000001 HYDRALAZINE 10 MG TABLET: Performed by: UROLOGY

## 2025-02-21 PROCEDURE — 94799 UNLISTED PULMONARY SVC/PX: CPT

## 2025-02-21 PROCEDURE — A9270 NON-COVERED ITEM OR SERVICE: HCPCS | Performed by: UROLOGY

## 2025-02-21 PROCEDURE — 63710000001 HYDROCODONE-ACETAMINOPHEN 10-325 MG TABLET: Performed by: STUDENT IN AN ORGANIZED HEALTH CARE EDUCATION/TRAINING PROGRAM

## 2025-02-21 PROCEDURE — 82948 REAGENT STRIP/BLOOD GLUCOSE: CPT | Performed by: UROLOGY

## 2025-02-21 PROCEDURE — 82948 REAGENT STRIP/BLOOD GLUCOSE: CPT

## 2025-02-21 PROCEDURE — 63710000001 CARVEDILOL 25 MG TABLET: Performed by: UROLOGY

## 2025-02-21 PROCEDURE — 63710000001 PANTOPRAZOLE 40 MG TABLET DELAYED-RELEASE: Performed by: UROLOGY

## 2025-02-21 PROCEDURE — 74176 CT ABD & PELVIS W/O CONTRAST: CPT

## 2025-02-21 PROCEDURE — 63710000001 POLYETHYLENE GLYCOL 17 G PACK: Performed by: STUDENT IN AN ORGANIZED HEALTH CARE EDUCATION/TRAINING PROGRAM

## 2025-02-21 PROCEDURE — 63710000001 AMLODIPINE 5 MG TABLET: Performed by: UROLOGY

## 2025-02-21 PROCEDURE — 63710000001 ATORVASTATIN 20 MG TABLET: Performed by: UROLOGY

## 2025-02-21 PROCEDURE — 25010000002 MORPHINE PER 10 MG: Performed by: UROLOGY

## 2025-02-21 PROCEDURE — 83880 ASSAY OF NATRIURETIC PEPTIDE: CPT

## 2025-02-21 PROCEDURE — 63710000001 MINOXIDIL 2.5 MG TABLET: Performed by: UROLOGY

## 2025-02-21 PROCEDURE — 63710000001 CETIRIZINE 10 MG TABLET: Performed by: UROLOGY

## 2025-02-21 PROCEDURE — 63710000001 ISOSORBIDE MONONITRATE 30 MG TABLET SUSTAINED-RELEASE 24 HOUR: Performed by: UROLOGY

## 2025-02-21 PROCEDURE — 63710000001 ACETAMINOPHEN 325 MG TABLET: Performed by: UROLOGY

## 2025-02-21 PROCEDURE — A9270 NON-COVERED ITEM OR SERVICE: HCPCS | Performed by: STUDENT IN AN ORGANIZED HEALTH CARE EDUCATION/TRAINING PROGRAM

## 2025-02-21 PROCEDURE — 63710000001 INSULIN LISPRO (HUMAN) PER 5 UNITS: Performed by: UROLOGY

## 2025-02-21 PROCEDURE — 25810000003 SODIUM CHLORIDE 0.9 % SOLUTION: Performed by: UROLOGY

## 2025-02-21 PROCEDURE — 63710000001 ONDANSETRON ODT 4 MG TABLET DISPERSIBLE: Performed by: UROLOGY

## 2025-02-21 PROCEDURE — 25010000002 CEFAZOLIN PER 500 MG: Performed by: UROLOGY

## 2025-02-21 PROCEDURE — 63710000001 HYDROCODONE-ACETAMINOPHEN 7.5-325 MG TABLET: Performed by: UROLOGY

## 2025-02-21 RX ORDER — SODIUM CHLORIDE 9 MG/ML
100 INJECTION, SOLUTION INTRAVENOUS CONTINUOUS
Status: DISCONTINUED | OUTPATIENT
Start: 2025-02-21 | End: 2025-02-21

## 2025-02-21 RX ORDER — INSULIN LISPRO 100 [IU]/ML
2-9 INJECTION, SOLUTION INTRAVENOUS; SUBCUTANEOUS
Status: DISCONTINUED | OUTPATIENT
Start: 2025-02-21 | End: 2025-02-24 | Stop reason: HOSPADM

## 2025-02-21 RX ORDER — INSULIN LISPRO 100 [IU]/ML
2-9 INJECTION, SOLUTION INTRAVENOUS; SUBCUTANEOUS
Status: DISCONTINUED | OUTPATIENT
Start: 2025-02-21 | End: 2025-02-21

## 2025-02-21 RX ORDER — POLYETHYLENE GLYCOL 3350 17 G/17G
34 POWDER, FOR SOLUTION ORAL DAILY
Status: DISCONTINUED | OUTPATIENT
Start: 2025-02-21 | End: 2025-02-24 | Stop reason: HOSPADM

## 2025-02-21 RX ORDER — DEXTROSE MONOHYDRATE 25 G/50ML
25 INJECTION, SOLUTION INTRAVENOUS
Status: DISCONTINUED | OUTPATIENT
Start: 2025-02-21 | End: 2025-02-24 | Stop reason: HOSPADM

## 2025-02-21 RX ORDER — HYDROCODONE BITARTRATE AND ACETAMINOPHEN 10; 325 MG/1; MG/1
1 TABLET ORAL EVERY 4 HOURS PRN
Status: DISCONTINUED | OUTPATIENT
Start: 2025-02-21 | End: 2025-02-24 | Stop reason: HOSPADM

## 2025-02-21 RX ORDER — IPRATROPIUM BROMIDE AND ALBUTEROL SULFATE 2.5; .5 MG/3ML; MG/3ML
1.5 SOLUTION RESPIRATORY (INHALATION) EVERY 4 HOURS PRN
Status: DISCONTINUED | OUTPATIENT
Start: 2025-02-21 | End: 2025-02-21

## 2025-02-21 RX ORDER — IBUPROFEN 600 MG/1
1 TABLET ORAL
Status: DISCONTINUED | OUTPATIENT
Start: 2025-02-21 | End: 2025-02-24 | Stop reason: HOSPADM

## 2025-02-21 RX ORDER — NICOTINE POLACRILEX 4 MG
15 LOZENGE BUCCAL
Status: DISCONTINUED | OUTPATIENT
Start: 2025-02-21 | End: 2025-02-24 | Stop reason: HOSPADM

## 2025-02-21 RX ORDER — IPRATROPIUM BROMIDE AND ALBUTEROL SULFATE 2.5; .5 MG/3ML; MG/3ML
3 SOLUTION RESPIRATORY (INHALATION) EVERY 4 HOURS PRN
Status: DISCONTINUED | OUTPATIENT
Start: 2025-02-21 | End: 2025-02-24 | Stop reason: HOSPADM

## 2025-02-21 RX ADMIN — ISOSORBIDE MONONITRATE 30 MG: 30 TABLET, EXTENDED RELEASE ORAL at 08:30

## 2025-02-21 RX ADMIN — HYDRALAZINE HYDROCHLORIDE 10 MG: 10 TABLET ORAL at 18:21

## 2025-02-21 RX ADMIN — CARVEDILOL 25 MG: 25 TABLET, FILM COATED ORAL at 18:21

## 2025-02-21 RX ADMIN — INSULIN LISPRO 6 UNITS: 100 INJECTION, SOLUTION INTRAVENOUS; SUBCUTANEOUS at 00:33

## 2025-02-21 RX ADMIN — HYDROCODONE BITARTRATE AND ACETAMINOPHEN 1 TABLET: 10; 325 TABLET ORAL at 23:45

## 2025-02-21 RX ADMIN — HYDRALAZINE HYDROCHLORIDE 10 MG: 10 TABLET ORAL at 08:30

## 2025-02-21 RX ADMIN — ATORVASTATIN CALCIUM 20 MG: 20 TABLET, FILM COATED ORAL at 21:58

## 2025-02-21 RX ADMIN — SODIUM CHLORIDE 100 ML/HR: 9 INJECTION, SOLUTION INTRAVENOUS at 18:51

## 2025-02-21 RX ADMIN — HYDROCODONE BITARTRATE AND ACETAMINOPHEN 1 TABLET: 7.5; 325 TABLET ORAL at 11:46

## 2025-02-21 RX ADMIN — PANTOPRAZOLE SODIUM 40 MG: 40 TABLET, DELAYED RELEASE ORAL at 08:30

## 2025-02-21 RX ADMIN — INSULIN LISPRO 4 UNITS: 100 INJECTION, SOLUTION INTRAVENOUS; SUBCUTANEOUS at 12:14

## 2025-02-21 RX ADMIN — IPRATROPIUM BROMIDE AND ALBUTEROL SULFATE 1.5 ML: .5; 3 SOLUTION RESPIRATORY (INHALATION) at 22:50

## 2025-02-21 RX ADMIN — INSULIN LISPRO 2 UNITS: 100 INJECTION, SOLUTION INTRAVENOUS; SUBCUTANEOUS at 18:21

## 2025-02-21 RX ADMIN — CARVEDILOL 25 MG: 25 TABLET, FILM COATED ORAL at 08:30

## 2025-02-21 RX ADMIN — INSULIN LISPRO 4 UNITS: 100 INJECTION, SOLUTION INTRAVENOUS; SUBCUTANEOUS at 21:58

## 2025-02-21 RX ADMIN — AMLODIPINE BESYLATE 5 MG: 5 TABLET ORAL at 21:58

## 2025-02-21 RX ADMIN — CETIRIZINE HYDROCHLORIDE 10 MG: 10 TABLET, FILM COATED ORAL at 08:30

## 2025-02-21 RX ADMIN — IPRATROPIUM BROMIDE AND ALBUTEROL SULFATE 3 ML: .5; 3 SOLUTION RESPIRATORY (INHALATION) at 22:50

## 2025-02-21 RX ADMIN — HYDROCODONE BITARTRATE AND ACETAMINOPHEN 1 TABLET: 10; 325 TABLET ORAL at 18:21

## 2025-02-21 RX ADMIN — ACETAMINOPHEN 650 MG: 325 TABLET, FILM COATED ORAL at 07:40

## 2025-02-21 RX ADMIN — POLYETHYLENE GLYCOL 3350 34 G: 17 POWDER, FOR SOLUTION ORAL at 12:15

## 2025-02-21 RX ADMIN — INSULIN LISPRO 2 UNITS: 100 INJECTION, SOLUTION INTRAVENOUS; SUBCUTANEOUS at 08:29

## 2025-02-21 RX ADMIN — MORPHINE SULFATE 4 MG: 4 INJECTION, SOLUTION INTRAMUSCULAR; INTRAVENOUS at 13:22

## 2025-02-21 RX ADMIN — ONDANSETRON 4 MG: 4 TABLET, ORALLY DISINTEGRATING ORAL at 12:14

## 2025-02-21 RX ADMIN — IPRATROPIUM BROMIDE AND ALBUTEROL SULFATE 1.5 ML: .5; 3 SOLUTION RESPIRATORY (INHALATION) at 04:45

## 2025-02-21 RX ADMIN — HYDRALAZINE HYDROCHLORIDE 10 MG: 10 TABLET ORAL at 21:58

## 2025-02-21 RX ADMIN — CEFAZOLIN 1000 MG: 1 INJECTION, POWDER, FOR SOLUTION INTRAMUSCULAR; INTRAVENOUS at 12:48

## 2025-02-21 RX ADMIN — AMLODIPINE BESYLATE 5 MG: 5 TABLET ORAL at 08:29

## 2025-02-21 RX ADMIN — MINOXIDIL 2.5 MG: 2.5 TABLET ORAL at 08:30

## 2025-02-21 NOTE — DISCHARGE SUMMARY
Urology Discharge Note    Name:  Dave Peguero  Age:  84 y.o.  Sex:  male  :  1940  MRN:  [id number]    Date of Admission: 2025   Date of Discharge:  2025    Admitting Diagnosis: Bladder tumor  Discharge Diagnosis: Bladder tumor    Presenting Problem  Active Hospital Problems    Diagnosis  POA    **Bladder cancer [C67.9]  Yes      Resolved Hospital Problems   No resolved problems to display.         Hospital Course  Patient is a 84 y.o. male presented with gross hematuria.  Workup revealed a large greater than 5 cm tumor in his bladder.  Patient is now presenting for treatment of this.  Patient underwent cystoscopy, transurethral resection of bladder tumor on 2025.  Patient did well postoperatively with his urine clearing appropriately on three-way irrigation.  Meek catheter was removed in the morning of .  Pt was unable to empty and developed severe abdominal pain. Meek replaced. Pt then experienced a TIA. Cardiology and Neurology consulted. Symptoms resolved and now patient stable for discharge with meek in place.  Patient is discharged home with no restrictions on his diet.  He is restricted from any heavy lifting or strenuous activity for 2 weeks.  Patient will resume his home medications including his blood thinners.  Patient will follow-up with Dr. Kevin Rodriguez in 1 week to review his pathology.      Procedures Performed    Procedure(s):  CYSTOSCOPY TRANSURETHRAL RESECTION OF BLADDER TUMOR  -------------------       Consults:   Consults       Date and Time Order Name Status Description    2025  5:19 PM Inpatient Hospitalist Consult Completed     2025 10:35 AM Inpatient Urology Consult Completed     2/10/2025  7:46 PM Inpatient Cardiology Consult Completed     2/10/2025  7:45 PM Inpatient Nephrology Consult Completed             Pertinent Test Results:   Lab Results (last 24 hours)       Procedure Component Value Units Date/Time    POC Glucose Once  [[id number]]  (Abnormal) Collected: 02/21/25 0009    Specimen: Blood Updated: 02/21/25 0010     Glucose 262 mg/dL      Comment: Serial Number: [id number]:  [id number]       Comprehensive Metabolic Panel [[id number]]  (Abnormal) Collected: 02/20/25 2229    Specimen: Blood from Arm, Left Updated: 02/20/25 2302     Glucose 368 mg/dL      BUN 40 mg/dL      Creatinine 2.74 mg/dL      Sodium 136 mmol/L      Potassium 4.9 mmol/L      Chloride 104 mmol/L      CO2 21.4 mmol/L      Calcium 9.0 mg/dL      Total Protein 5.4 g/dL      Albumin 3.4 g/dL      ALT (SGPT) 13 U/L      AST (SGOT) 17 U/L      Alkaline Phosphatase 71 U/L      Total Bilirubin <0.2 mg/dL      Globulin 2.0 gm/dL      A/G Ratio 1.7 g/dL      BUN/Creatinine Ratio 14.6     Anion Gap 10.6 mmol/L      eGFR 21.9 mL/min/1.73     Narrative:      GFR Categories in Chronic Kidney Disease (CKD)      GFR Category          GFR (mL/min/1.73)    Interpretation  G1                     90 or greater         Normal or high (1)  G2                      60-89                Mild decrease (1)  G3a                   45-59                Mild to moderate decrease  G3b                   30-44                Moderate to severe decrease  G4                    15-29                Severe decrease  G5                    14 or less           Kidney failure          (1)In the absence of evidence of kidney disease, neither GFR category G1 or G2 fulfill the criteria for CKD.    eGFR calculation 2021 CKD-EPI creatinine equation, which does not include race as a factor    CBC & Differential [[id number]]  (Abnormal) Collected: 02/20/25 2229    Specimen: Blood from Arm, Left Updated: 02/20/25 2239    Narrative:      The following orders were created for panel order CBC & Differential.  Procedure                               Abnormality         Status                     ---------                               -----------         ------                     CBC Auto Differential[[id number]]         Abnormal            Final result                 Please view results for these tests on the individual orders.    CBC Auto Differential [[id number]]  (Abnormal) Collected: 02/20/25 2229    Specimen: Blood from Arm, Left Updated: 02/20/25 2239     WBC 8.51 10*3/mm3      RBC 3.02 10*6/mm3      Hemoglobin 8.7 g/dL      Hematocrit 27.0 %      MCV 89.4 fL      MCH 28.8 pg      MCHC 32.2 g/dL      RDW 12.7 %      RDW-SD 41.7 fl      MPV 10.7 fL      Platelets 166 10*3/mm3      Neutrophil % 87.6 %      Lymphocyte % 6.5 %      Monocyte % 5.6 %      Eosinophil % 0.0 %      Basophil % 0.1 %      Immature Grans % 0.2 %      Neutrophils, Absolute 7.45 10*3/mm3      Lymphocytes, Absolute 0.55 10*3/mm3      Monocytes, Absolute 0.48 10*3/mm3      Eosinophils, Absolute 0.00 10*3/mm3      Basophils, Absolute 0.01 10*3/mm3      Immature Grans, Absolute 0.02 10*3/mm3      nRBC 0.0 /100 WBC     POC Glucose Once [[id number]]  (Abnormal) Collected: 02/20/25 2112    Specimen: Blood Updated: 02/20/25 2113     Glucose 381 mg/dL      Comment: Serial Number: [id number]:  [id number]       POC Glucose Once [[id number]]  (Abnormal) Collected: 02/20/25 1729    Specimen: Blood Updated: 02/20/25 1731     Glucose 194 mg/dL      Comment: Serial Number: [id number]:  [id number]       Tissue Pathology Exam [[id number]] Collected: 02/20/25 1256    Specimen: Tissue from Urinary Bladder, Tissue from Urinary Bladder Updated: 02/20/25 1426    POC Glucose Once [[id number]]  (Abnormal) Collected: 02/20/25 1406    Specimen: Blood Updated: 02/20/25 1408     Glucose 176 mg/dL      Comment: Serial Number: [id number]:  [id number]       POC Glucose STAT [[id number]]  (Abnormal) Collected: 02/20/25 1142    Specimen: Blood Updated: 02/20/25 1145     Glucose 208 mg/dL      Comment: Serial Number: [id number]:  [id number]       POC Glucose Once [[id number]]  (Abnormal) Collected: 02/20/25 1000    Specimen: Blood Updated: 02/20/25 1004     Glucose  241 mg/dL      Comment: Serial Number: [id number]:  [id number]             Imaging Results (Last 72 Hours)       ** No results found for the last 72 hours. **            Condition on Discharge:  Stable    Vital Signs     Vitals:    02/21/25 0010 02/21/25 0412 02/21/25 0445 02/21/25 0449   BP: 149/63 169/61     BP Location: Right arm Right arm     Patient Position: Lying Lying     Pulse: 95 94 91 89   Resp: 14 18 18 18   Temp: 98.2 °F (36.8 °C) 97.9 °F (36.6 °C)     TempSrc: Oral Oral     SpO2: 91% 90% 92% 91%   Weight:       Height:           Physical Exam:   General Appearance alert, appears stated age, and cooperative  Head normocephalic, without obvious abnormality and atraumatic  Abdomen no guarding and no rebound tenderness  Skin no bleeding, bruising or rash  Neurologic Mental Status orientated to person, place, time and situation    Discharge Disposition  Home or Self Care    Discharge Medications     Discharge Medications        New Medications        Instructions Start Date   cephalexin 500 MG capsule  Commonly known as: KEFLEX   500 mg, Oral, 3 Times Daily      oxyCODONE-acetaminophen 5-325 MG per tablet  Commonly known as: Percocet   1 tablet, Oral, Every 6 Hours PRN             Continue These Medications        Instructions Start Date   amLODIPine 5 MG tablet  Commonly known as: NORVASC   1 tablet, 2 Times Daily      atorvastatin 20 MG tablet  Commonly known as: LIPITOR   20 mg, Every Evening      calcium carbonate 600 MG tablet  Commonly known as: OS-RUTH   600 mg, 2 Times Daily With Meals      carvedilol 25 MG tablet  Commonly known as: COREG   25 mg, Oral, 2 Times Daily With Meals      cetirizine 10 MG tablet  Commonly known as: zyrTEC   10 mg, Oral, Daily      hydrALAZINE 10 MG tablet  Commonly known as: APRESOLINE   10 mg, Oral, 3 Times Daily      isosorbide mononitrate 30 MG 24 hr tablet  Commonly known as: IMDUR   30 mg, Oral, Every 24 Hours Scheduled      minoxidil 2.5 MG tablet  Commonly  known as: LONITEN   2.5 mg, Oral, Every 24 Hours Scheduled      pantoprazole 40 MG EC tablet  Commonly known as: PROTONIX   40 mg, Daily      sucralfate 1 g tablet  Commonly known as: CARAFATE   1 g, Every Other Day             Stop These Medications      aspirin 81 MG EC tablet     clopidogrel 75 MG tablet  Commonly known as: PLAVIX              Discharge Diet:   Diet Instructions       Advance Diet As Tolerated -Target Diet: Regular      Target Diet: Regular            Activity at Discharge:   Activity Instructions       Other Activity Instructions      Activity Instructions: No heavy lifting or strenuous activity for 2 weeks            Follow-up Appointments Dr. Kevin Rodriguez in 1 week  No future appointments.       Kevin Rodriguez MD  02/21/25  07:20 EST

## 2025-02-21 NOTE — DISCHARGE PLACEMENT REQUEST
"Dave Arshad (84 y.o. Male)       Date of Birth   1940    Social Security Number       Address   [address] [place] [address]    Home Phone   [phone]    MRN   [id number]       Confucianism   Restorationist    Marital Status                               Admission Date   2/20/25    Admission Type   Elective    Admitting Provider   Kevin Rodriguez MD    Attending Provider   Kevin Rodriguez MD    Department, Room/Bed   [facility] SURG[facility] INPATIENT, 4111/1       Discharge Date       Discharge Disposition   Home or Self Care    Discharge Destination                                 Attending Provider: Kevin Rodriguez MD    Allergies: Benadryl [Diphenhydramine]    Isolation: None   Infection: None   Code Status: No CPR    Ht: 175.3 cm (69\")   Wt: 91.2 kg (201 lb)    Admission Cmt: None   Principal Problem: Bladder cancer [C67.9]                   Active Insurance as of 2/20/2025       Primary Coverage       Payor Plan Insurance Group Employer/Plan Group    ANTHEM MEDICARE REPLACEMENT ANTHEM MED ADV HMO [id number]       Payor Plan Address Payor Plan Phone Number Payor Plan Fax Number Effective Dates    [address] [phone]  1/1/2025 - None Entered    [facility] [address]         Subscriber Name Subscriber Birth Date Member ID       DAVE ARSHAD 1940 [id number]                     Emergency Contacts        (Rel.) Home Phone Work Phone Mobile Phone    Rosey Arshad (Spouse) [phone] -- [phone]    Grisel Cody (Daughter) [phone] -- [phone]    Frances Davis (Daughter) -- -- [phone]                "

## 2025-02-21 NOTE — PLAN OF CARE
Goal Outcome Evaluation:      Patient alert and oriented x 4. Able to make needs known. Pain treated per medications on the MAR. Continuous bladder irrigation, with meek. Has clear yellow output. Personal items and call light in reach. Plan of care is ongoing.

## 2025-02-21 NOTE — CASE MANAGEMENT/SOCIAL WORK
Discharge Planning Assessment   [facility]     Patient Name: Dave Peguero  MRN: [id number]  Today's Date: 2/21/2025    Admit Date: 2/20/2025    Plan: Home. Family will transport at discharge.   Discharge Needs Assessment       Row Name 02/21/25 1656       Living Environment    People in Home spouse;child(savannah), adult;grandchild(savannah)    Name(s) of People in Home wife Rosey, adult son and adult grandson    Current Living Arrangements home    Potentially Unsafe Housing Conditions none    In the past 12 months has the electric, gas, oil, or water company threatened to shut off services in your home? No    Primary Care Provided by self    Provides Primary Care For no one    Family Caregiver if Needed spouse;child(savannah), adult    Family Caregiver Names wife Rosey, adult son    Quality of Family Relationships helpful;involved;supportive    Able to Return to Prior Arrangements yes       Resource/Environmental Concerns    Resource/Environmental Concerns none    Transportation Concerns none       Transportation Needs    In the past 12 months, has lack of transportation kept you from medical appointments or from getting medications? no    In the past 12 months, has lack of transportation kept you from meetings, work, or from getting things needed for daily living? No       Food Insecurity    Within the past 12 months, you worried that your food would run out before you got the money to buy more. Never true    Within the past 12 months, the food you bought just didn't last and you didn't have money to get more. Never true       Transition Planning    Patient/Family Anticipates Transition to home with family    Patient/Family Anticipated Services at Transition none    Transportation Anticipated car, drives self;family or friend will provide       Discharge Needs Assessment    Readmission Within the Last 30 Days no previous admission in last 30 days    Equipment Currently Used at Home none;other (see comments)  has rolling walker  but not using    Concerns to be Addressed care coordination/care conferences;discharge planning    Do you want help finding or keeping work or a job? I do not need or want help    Do you want help with school or training? For example, starting or completing job training or getting a high school diploma, GED or equivalent No    Anticipated Changes Related to Illness none    Equipment Needed After Discharge none    Provided Post Acute Provider List? N/A    N/A Provider List Comment denies dc needs                   Discharge Plan       Row Name 02/21/25 0049       Plan    Plan Home. Family will transport at discharge.    Patient/Family in Agreement with Plan yes    Plan Comments Patient lives at home with wife, adult son and grandson. . Patient normally drives. Family  will transport at discharge. Patient performs ADL. PCP and pharmacy confirmed. Denies financial assistance needs for medication and/or food. Denies HH and/or rehab needs.   DC barriers: monitoring hgb, pain control, bm. [facility] following. Plan per liaison is to admit once he discharges home                   Continued Care and Services - Admitted Since 2/20/2025    No active coordination exists for this encounter.       Expected Discharge Date and Time       Expected Discharge Date Expected Discharge Time    Feb 21, 2025            Demographic Summary       Row Name 02/21/25 8270       General Information    Admission Type other (see comments)    Arrived From home;PACU/recovery room    Referral Source admission list    Reason for Consult discharge planning    Preferred Language English       Contact Information    Permission Granted to Share Info With                    Functional Status       Row Name 02/21/25 6210       Functional Status    Usual Activity Tolerance good    Current Activity Tolerance good       Functional Status, IADL    Medications independent    Meal Preparation independent    Housekeeping independent    Laundry  independent    Shopping independent    If for any reason you need help with day-to-day activities such as bathing, preparing meals, shopping, managing finances, etc., do you get the help you need? I don't need any help       Mental Status    General Appearance WDL WDL       Mental Status Summary    Recent Changes in Mental Status/Cognitive Functioning no changes                  Meryl Ruiz RN    SIPS 1  [email or web address]  Office [phone]  Cell [phone]

## 2025-02-21 NOTE — PROGRESS NOTES
Urology Progress Note    Patient Identification:  Name:  Dave Peguero  Age:  84 y.o.  Sex:  male  :  1940  MRN:  [id number]    Chief Complaint:  Patient complains of left sided pain as well as poor urine output    History of Present Illness: Patient did void a very small amount.  Postvoid residual is 131 mL.  CT scan was performed which reveals some mild left hydronephrosis.  I suspect this is due to edema from resection on the left side of the bladder near the ureteral orifice though the ureteral orifice was clearly intact at the time of the procedure.    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     Atherosclerosis of native arteries of extremities with intermittent claudication, left leg     Cancer     breast cancer hx    Carotid artery stenosis     Cerebral vascular disease     CHF (congestive heart failure)     Coronary artery disease     Diabetes mellitus     type 1    Hyperlipidemia     Hypertension     Kidney stone     Myocardial infarction     x 3     Past Surgical History:  Past Surgical History:   Procedure Laterality Date    CARDIAC CATHETERIZATION      CORONARY ARTERY BYPASS GRAFT      TRIPLE    DENTAL PROCEDURE      all teeth have been pulled    FEMORAL ARTERY STENT Bilateral     JOINT REPLACEMENT Right     knee    PROSTATE SURGERY      TRANSURETHRAL RESECTION OF BLADDER TUMOR N/A 2025    Procedure: CYSTOSCOPY TRANSURETHRAL RESECTION OF BLADDER TUMOR;  Surgeon: Kevin Rodriguez MD;  Location: [facility] OR;  Service: Urology;  Laterality: N/A;     Home Meds:  Medications Prior to Admission   Medication Sig Dispense Refill Last Dose/Taking    amLODIPine (NORVASC) 5 MG tablet Take 1 tablet by mouth 2 (Two) Times a Day.   Taking    aspirin 81 MG EC tablet Take 1 tablet by mouth Daily.   Taking    atorvastatin  (LIPITOR) 20 MG tablet Take 1 tablet by mouth Every Evening.   Taking    calcium carbonate (OS-RUTH) 600 MG tablet Take 1 tablet by mouth 2 (Two) Times a Day With Meals.   Taking    carvedilol (COREG) 25 MG tablet Take 1 tablet by mouth 2 (Two) Times a Day With Meals. 60 tablet 1 Taking    cetirizine (zyrTEC) 10 MG tablet Take 1 tablet by mouth Daily. 90 tablet 1 Taking    clopidogrel (PLAVIX) 75 MG tablet Take 1 tablet by mouth Daily.   Taking    hydrALAZINE (APRESOLINE) 10 MG tablet Take 1 tablet by mouth 3 (Three) Times a Day. 270 tablet 0 Taking    isosorbide mononitrate (IMDUR) 30 MG 24 hr tablet Take 1 tablet by mouth Daily. 30 tablet 1 Taking    minoxidil (LONITEN) 2.5 MG tablet Take 1 tablet by mouth Daily. 30 tablet 0 Taking    pantoprazole (PROTONIX) 40 MG EC tablet Take 1 tablet by mouth Daily.   Taking    sucralfate (CARAFATE) 1 g tablet Take 1 tablet by mouth Every Other Day.   Taking     Current Meds:    Current Facility-Administered Medications:     acetaminophen (TYLENOL) tablet 650 mg, 650 mg, Oral, Q4H PRN, 650 mg at 02/21/25 0740 **OR** acetaminophen (TYLENOL) suppository 650 mg, 650 mg, Rectal, Q4H PRN, Kevin Rodriguez MD    amLODIPine (NORVASC) tablet 5 mg, 5 mg, Oral, BID, Kevin Rodriguez MD, 5 mg at 02/21/25 0829    atorvastatin (LIPITOR) tablet 20 mg, 20 mg, Oral, Nightly, Kevin Rodriguez MD, 20 mg at 02/20/25 2025    carvedilol (COREG) tablet 25 mg, 25 mg, Oral, BID With Meals, Keivn Rodriguez MD, 25 mg at 02/21/25 0830    cetirizine (zyrTEC) tablet 10 mg, 10 mg, Oral, Daily, Kevin Rodriguez MD, 10 mg at 02/21/25 0830    dextrose (D50W) (25 g/50 mL) IV injection 25 g, 25 g, Intravenous, Q15 Min PRN, Naseem Kwok MD    dextrose (GLUTOSE) oral gel 15 g, 15 g, Oral, Q15 Min PRN, Naseem wKok MD    glucagon (GLUCAGEN) injection 1 mg, 1 mg, Intramuscular, Q15 Min PRN, Naseem Kwok MD    hydrALAZINE (APRESOLINE) tablet 10 mg, 10 mg, Oral, TID, Kevin Rodriguez MD, 10 mg  "at 25 0830    HYDROcodone-acetaminophen (NORCO)  MG per tablet 1 tablet, 1 tablet, Oral, Q4H PRN, Juan Manuel Carmen MD    insulin lispro (HUMALOG/ADMELOG) injection 2-9 Units, 2-9 Units, Subcutaneous, 4x Daily AC & at Bedtime, Kevin Rodriguez MD, 4 Units at 25 1214    ipratropium-albuterol (DUO-NEB) nebulizer solution 1.5 mL, 1.5 mL, Nebulization, Q4H PRN, Naseem Kwok MD, 1.5 mL at 25 0445    isosorbide mononitrate (IMDUR) 24 hr tablet 30 mg, 30 mg, Oral, Q24H, Kevin Rodriguez MD, 30 mg at 25 0830    minoxidil (LONITEN) tablet 2.5 mg, 2.5 mg, Oral, Q24H, Kevin Rodriguez MD, 2.5 mg at 25 0830    ondansetron ODT (ZOFRAN-ODT) disintegrating tablet 4 mg, 4 mg, Oral, Q6H PRN, 4 mg at 25 1214 **OR** ondansetron (ZOFRAN) injection 4 mg, 4 mg, Intravenous, Q6H PRN, Kevin Rodriguez MD    pantoprazole (PROTONIX) EC tablet 40 mg, 40 mg, Oral, Daily, Kevin Rodriguez MD, 40 mg at 25 0830    polyethylene glycol (MIRALAX) packet 34 g, 34 g, Oral, Daily, Juan Manuel Carmen MD, 34 g at 25 1215    sodium chloride 0.9 % infusion, 100 mL/hr, Intravenous, Continuous, Kevin Rodriguez MD    sucralfate (CARAFATE) tablet 1 g, 1 g, Oral, Every Other Day, Kevin Rodriguez MD  Allergies:  Benadryl [diphenhydramine]    Review of Systems     Objective:  tMax 24 hours:  Temp (24hrs), Av °F (36.7 °C), Min:97.9 °F (36.6 °C), Max:98.2 °F (36.8 °C)    Vital Sign Ranges:  Temp:  [97.9 °F (36.6 °C)-98.2 °F (36.8 °C)] 98 °F (36.7 °C)  Heart Rate:  [89-98] 89  Resp:  [14-18] 17  BP: (149-169)/(53-74) 163/74  Intake and Output Last 3 Shifts:  I/O last 3 completed shifts:  In: 1088 [P.O.:480; I.V.:608]  Out: -2300     Exam:  /74 (BP Location: Left arm, Patient Position: Sitting)   Pulse 89   Temp 98 °F (36.7 °C) (Oral)   Resp 17   Ht 175.3 cm (69\")   Wt 91.2 kg (201 lb)   SpO2 91%   BMI 29.68 kg/m²    General Appearance:    Alert, cooperative, no acute distress, general   " "      appearance is normal   Head:    Normocephalic, without obvious abnormality, atraumatic   Eyes:            Pupils/Irises normal. Exterior inspection conjunctivae       and lids normal.   Ears:    Normal external inspection   Nose:   Exterior inspection of nose is normal   Throat:   Lips, mucosa, and tongue normal   Lungs:     Respirations unlabored; normal effort, no audible     abnormality   CV:   Regular rhythm and normal rate, no edema   Abdomen:     examination of the abdomen is normal with     no masses, tenderness, or distension    :        Data Review:  All labs (24hrs):    Lab Results (last 24 hours)       Procedure Component Value Units Date/Time    POC Glucose Once [[id number]]  (Abnormal) Collected: 02/21/25 1637    Specimen: Blood Updated: 02/21/25 1639     Glucose 168 mg/dL      Comment: Serial Number: [id number]:  [id number]       Tissue Pathology Exam [[id number]] Collected: 02/20/25 1256    Specimen: Tissue from Urinary Bladder, Tissue from Urinary Bladder Updated: 02/21/25 5015     Case Report --     Surgical Pathology Report                         Case: [id number]                                  Authorizing Provider:  Kevin Rodriguez MD       Collected:           02/20/2025 12:56 PM          Ordering Location:     [facility] MAIN  Received:            02/20/2025 02:13 PM                                 OR                                                                           Pathologist:           Luis Rod MD                                                            Specimens:   1) - Urinary Bladder, SUPERFICIAL BLADDER TUMOR                                                     2) - Urinary Bladder, DEEP BLADDER TUMOR                                                    Final Diagnosis --     Specimen 1 (\"superficial bladder tumor\", transurethral resection):  Invasive high-grade urothelial carcinoma  Tumor invades subepithelial connective tissue  No definitive " "muscularis propria identified  See synoptic report for additional details    Specimen 2 (\"deep bladder tumor\", transurethral resection):  Invasive high-grade urothelial carcinoma  Tumor invades subepithelial connective tissue  Muscularis propria present and uninvolved by tumor  See synoptic report for additional details    HOLDEN       Synoptic Checklist --     URINARY BLADDER: Biopsy and Transurethral Resection of Bladder Tumor (TURBT)  URINARY BLADDER: BIOPSY AND TRANSURETHRAL RESECTION OF BLADDER TUMOR (TURBT) - 1  Protocol posted: 9/20/2023    SPECIMEN     Procedure:    Transurethral resection of bladder (TURBT)     TUMOR     Tumor Site:    Not specified      Histologic Type:    Urothelial carcinoma, invasive (conventional)      Histologic Grade:    High-grade      Tumor Extent:    Invades lamina propria (subepithelial connective tissue): Extensive      Lymphatic and / or Vascular Invasion:    Not identified      Tumor Configuration:    Papillary      Muscularis Propria (detrusor muscle):    Not identified     ADDITIONAL FINDINGS     Associated Epithelial Lesions:    None identified   URINARY BLADDER: Biopsy and Transurethral Resection of Bladder Tumor (TURBT)  URINARY BLADDER: BIOPSY AND TRANSURETHRAL RESECTION OF BLADDER TUMOR (TURBT) - 2  Protocol posted: 9/20/2023    SPECIMEN     Procedure:    Transurethral resection of bladder (TURBT)     TUMOR     Tumor Site:    Not specified      Histologic Type:    Urothelial carcinoma, invasive (conventional)      Histologic Grade:    High-grade      Tumor Extent:    Invades lamina propria (subepithelial connective tissue): Extensive      Lymphatic and / or Vascular Invasion:    Not identified      Tumor Configuration:    Papillary      Muscularis Propria (detrusor muscle):    Present in specimen     ADDITIONAL FINDINGS     Associated Epithelial Lesions:    None identified        Gross Description --     1. Urinary Bladder.  Received in formalin designated superficial " bladder tumor are multiple irregular fragments of pink, somewhat friable tissue measuring 6 x 6 x 3.5 cm in aggregate.  Representative tissue comprising approximately 60% of the specimen is submitted in cassette A3J.  HOLDEN    2. Urinary Bladder.  Received in formalin designated deep bladder tumor are multiple fragments of reddish, somewhat friable tissue measuring 2.5 x 2 x 0.3 cm in aggregate.  Submitted in 1 cassette.  HOLDEN        POC Glucose 4x Daily Before Meals & at Bedtime [[id number]]  (Abnormal) Collected: 02/21/25 1131    Specimen: Blood Updated: 02/21/25 1133     Glucose 230 mg/dL      Comment: Serial Number: [id number]:  [id number]       POC Glucose 4x Daily Before Meals & at Bedtime [[id number]]  (Abnormal) Collected: 02/21/25 0801    Specimen: Blood Updated: 02/21/25 0803     Glucose 161 mg/dL      Comment: Serial Number: [id number]:  [id number]       POC Glucose Once [[id number]]  (Abnormal) Collected: 02/21/25 0009    Specimen: Blood Updated: 02/21/25 0010     Glucose 262 mg/dL      Comment: Serial Number: [id number]:  [id number]       Comprehensive Metabolic Panel [[id number]]  (Abnormal) Collected: 02/20/25 2229    Specimen: Blood from Arm, Left Updated: 02/20/25 2302     Glucose 368 mg/dL      BUN 40 mg/dL      Creatinine 2.74 mg/dL      Sodium 136 mmol/L      Potassium 4.9 mmol/L      Chloride 104 mmol/L      CO2 21.4 mmol/L      Calcium 9.0 mg/dL      Total Protein 5.4 g/dL      Albumin 3.4 g/dL      ALT (SGPT) 13 U/L      AST (SGOT) 17 U/L      Alkaline Phosphatase 71 U/L      Total Bilirubin <0.2 mg/dL      Globulin 2.0 gm/dL      A/G Ratio 1.7 g/dL      BUN/Creatinine Ratio 14.6     Anion Gap 10.6 mmol/L      eGFR 21.9 mL/min/1.73     Narrative:      GFR Categories in Chronic Kidney Disease (CKD)      GFR Category          GFR (mL/min/1.73)    Interpretation  G1                     90 or greater         Normal or high (1)  G2                      60-89                Mild decrease  (1)  G3a                   45-59                Mild to moderate decrease  G3b                   30-44                Moderate to severe decrease  G4                    15-29                Severe decrease  G5                    14 or less           Kidney failure          (1)In the absence of evidence of kidney disease, neither GFR category G1 or G2 fulfill the criteria for CKD.    eGFR calculation 2021 CKD-EPI creatinine equation, which does not include race as a factor    CBC & Differential [[id number]]  (Abnormal) Collected: 02/20/25 2229    Specimen: Blood from Arm, Left Updated: 02/20/25 2239    Narrative:      The following orders were created for panel order CBC & Differential.  Procedure                               Abnormality         Status                     ---------                               -----------         ------                     CBC Auto Differential[[id number]]        Abnormal            Final result                 Please view results for these tests on the individual orders.    CBC Auto Differential [[id number]]  (Abnormal) Collected: 02/20/25 2229    Specimen: Blood from Arm, Left Updated: 02/20/25 2239     WBC 8.51 10*3/mm3      RBC 3.02 10*6/mm3      Hemoglobin 8.7 g/dL      Hematocrit 27.0 %      MCV 89.4 fL      MCH 28.8 pg      MCHC 32.2 g/dL      RDW 12.7 %      RDW-SD 41.7 fl      MPV 10.7 fL      Platelets 166 10*3/mm3      Neutrophil % 87.6 %      Lymphocyte % 6.5 %      Monocyte % 5.6 %      Eosinophil % 0.0 %      Basophil % 0.1 %      Immature Grans % 0.2 %      Neutrophils, Absolute 7.45 10*3/mm3      Lymphocytes, Absolute 0.55 10*3/mm3      Monocytes, Absolute 0.48 10*3/mm3      Eosinophils, Absolute 0.00 10*3/mm3      Basophils, Absolute 0.01 10*3/mm3      Immature Grans, Absolute 0.02 10*3/mm3      nRBC 0.0 /100 WBC     POC Glucose Once [[id number]]  (Abnormal) Collected: 02/20/25 2112    Specimen: Blood Updated: 02/20/25 2113     Glucose 381 mg/dL      Comment: Serial  Number: [id number]:  [id number]             Radiology:   Imaging Results (Last 72 Hours)       Procedure Component Value Units Date/Time    CT Abdomen Pelvis Without Contrast [[id number]] Collected: 02/21/25 1629     Updated: 02/21/25 1637    Narrative:      CT ABDOMEN PELVIS WO CONTRAST    Date of Exam: 2/21/2025 4:26 PM EST    Indication: Abdominal pain.    Comparison: None available.    Technique: Axial CT images were obtained of the abdomen and pelvis without the administration of contrast. Sagittal and coronal reconstructions were performed.  Automated exposure control and iterative reconstruction methods were used.      Findings:    Liver: The liver is unremarkable in morphology. Evaluation for focal liver lesions is limited without IV contrast. No biliary dilation is seen.    Gallbladder: Unremarkable.    Pancreas: Unremarkable.    Spleen: Multiple splenic granulomas.    Adrenal glands: Unremarkable.    Genitourinary tract: There is bladder wall thickening/indistinctness, concerning for cystitis. Small amount of air is seen within the bladder lumen which may be related to recent instrumentation or infection with gas-forming organism. There is mild left   hydronephrosis without obstructing calculus or mass identified. This could represent ascending infection or recently passed calculus. There are bilateral renal hilar vascular calcifications. Additional nonobstructing calculi measuring up to 5 mm cannot   be excluded. Right ureter is decompressed. Prostate gland is unremarkable.    Gastrointestinal tract: Limited evaluation of the hollow viscera due to lack of IV contrast administration. Colonic diverticulosis. Small hiatal hernia. No evidence of bowel obstruction    Appendix: No findings to suggest acute appendicitis.    Other findings: No free air or free fluid is identified. No pathologically enlarged lymph nodes are seen. Vascular calcifications are present.    Bones and soft tissues: No acute or  suspicious osseous or soft tissue lesion is identified. Fat-containing umbilical hernia.    Lung bases: Bilateral lower lobe opacities with interlobular septal thickening in the left lower lobe and small bilateral pleural effusions      Impression:      Impression:  1.There is bladder wall thickening/indistinctness, concerning for cystitis. Small amount of air is seen within the bladder lumen which may be related to recent instrumentation or infection with gas-forming organism. Please correlate with urinalysis.  2.There is mild left hydronephrosis without obstructing calculus or mass identified. This could represent ascending infection or recently passed calculus.  3.Bilateral renal hilar vascular calcifications. Additional nonobstructive nephrolithiasis cannot be entirely excluded.  4.Bilateral lung base opacities may represent edema or infiltrates. Small bilateral pleural effusions are partially imaged.  5.Additional findings as detailed above.        Electronically Signed: Enrike Coleman MD    2/21/2025 4:35 PM EST    Workstation ID: [id number]            Assessment/Plan:    Principal Problem:    Bladder cancer    Bladder cancer status post TURBT yesterday  Mild left hydronephrosis likely due to edema from the resection  Left flank plain  Poor urine output    Plan  IV and oral hydration  Cancel discharge  If patient is doing better then discharged home tomorrow  If patient continues to have significant left flank pain he may need a stent placement      Kevin Rodriguez MD  2/21/2025  18:11 EST

## 2025-02-21 NOTE — PROGRESS NOTES
[facility] MEDICINE SERVICE  DAILY PROGRESS NOTE    NAME: Dave Peguero  : 1940  MRN: [id number]      LOS: 0 days     PROVIDER OF SERVICE: Juan Manuel Carmen MD    Chief Complaint: Bladder cancer    Subjective:     Interval History:  History taken from: patient    Somewhat painful today.        Review of Systems:   Review of Systems    Objective:     Vital Signs  Temp:  [97.7 °F (36.5 °C)-98.2 °F (36.8 °C)] 97.9 °F (36.6 °C)  Heart Rate:  [65-98] 89  Resp:  [14-21] 17  BP: (148-185)/(53-76) 169/61  Flow (L/min) (Oxygen Therapy):  [2-6] 2   Body mass index is 29.68 kg/m².    Physical Exam  Physical Exam  Cardiovascular:      Rate and Rhythm: Normal rate and regular rhythm.      Pulses: Normal pulses.      Heart sounds: Normal heart sounds.   Pulmonary:      Effort: Pulmonary effort is normal.      Breath sounds: Normal breath sounds.   Abdominal:      General: Abdomen is flat.      Palpations: Abdomen is soft.   Neurological:      Mental Status: He is alert.            Diagnostic Data    Results from last 7 days   Lab Units 25  2229   WBC 10*3/mm3 8.51   HEMOGLOBIN g/dL 8.7*   HEMATOCRIT % 27.0*   PLATELETS 10*3/mm3 166   GLUCOSE mg/dL 368*   CREATININE mg/dL 2.74*   BUN mg/dL 40*   SODIUM mmol/L 136   POTASSIUM mmol/L 4.9   AST (SGOT) U/L 17   ALT (SGPT) U/L 13   ALK PHOS U/L 71   BILIRUBIN mg/dL <0.2   ANION GAP mmol/L 10.6       No radiology results for the last day      I reviewed the patient's new clinical results.    Assessment/Plan:     Active and Resolved Problems  Active Hospital Problems    Diagnosis  POA    **Bladder cancer [C67.9]  Yes      Resolved Hospital Problems   No resolved problems to display.       Bladder mass  -UA as of 2/10/2025: 21-50 RBCs, 6-10 WBCs, nitrite negative, bacteria negative  -Urine protein creatinine ratio 5, 969  -Postop day 1 of cystoscopy transurethral resection of bladder tumor  -Pain control    Constipation  -MiraLAX ordered, suppository as available.      CKD  Trend renal function labs.     CAD  PVD  HLD  -Continue statin, metoprolol.  Hold Plavix and ASA.    -Appreciate urology's recommendations on when to resume AC     DM2  -Accu-Cheks before meals and at bedtime  -SSI while inpatient  -CCD     Tobacco use  -Tobacco cessation  -Nicotine patch       VTE Prophylaxis:  Mechanical VTE prophylaxis orders are present.         Disposition Planning:     Barriers to Discharge: Improvement in pain, bowel movement  Anticipated Date of Discharge: 2/22/2025  Place of Discharge: Home      Time: 33 minutes     Code Status and Medical Interventions: No CPR (Do Not Attempt to Resuscitate); Limited Support; No intubation (DNI)   Ordered at: 02/20/25 1719     Medical Intervention Limits:    No intubation (DNI)     Level Of Support Discussed With:    Patient     Code Status (Patient has no pulse and is not breathing):    No CPR (Do Not Attempt to Resuscitate)     Medical Interventions (Patient has pulse or is breathing):    Limited Support       Signature: Electronically signed by Juan Manuel Carmen MD, 02/21/25, 12:02 EST.  [facility] Hospitalist Team

## 2025-02-22 ENCOUNTER — APPOINTMENT (OUTPATIENT)
Dept: MRI IMAGING | Facility: HOSPITAL | Age: 85
DRG: 668 | End: 2025-02-22
Payer: MEDICARE

## 2025-02-22 ENCOUNTER — APPOINTMENT (OUTPATIENT)
Dept: CT IMAGING | Facility: HOSPITAL | Age: 85
DRG: 668 | End: 2025-02-22
Payer: MEDICARE

## 2025-02-22 ENCOUNTER — APPOINTMENT (OUTPATIENT)
Dept: GENERAL RADIOLOGY | Facility: HOSPITAL | Age: 85
DRG: 668 | End: 2025-02-22
Payer: MEDICARE

## 2025-02-22 PROBLEM — N32.89 BLADDER MASS: Status: ACTIVE | Noted: 2025-02-22

## 2025-02-22 LAB
ANION GAP SERPL CALCULATED.3IONS-SCNC: 10.5 MMOL/L (ref 5–15)
BASOPHILS # BLD AUTO: 0.04 10*3/MM3 (ref 0–0.2)
BASOPHILS NFR BLD AUTO: 0.4 % (ref 0–1.5)
BUN SERPL-MCNC: 40 MG/DL (ref 8–23)
BUN/CREAT SERPL: 12.9 (ref 7–25)
CALCIUM SPEC-SCNC: 8.8 MG/DL (ref 8.6–10.5)
CHLORIDE SERPL-SCNC: 104 MMOL/L (ref 98–107)
CO2 SERPL-SCNC: 21.5 MMOL/L (ref 22–29)
CREAT SERPL-MCNC: 3.09 MG/DL (ref 0.76–1.27)
DEPRECATED RDW RBC AUTO: 41.6 FL (ref 37–54)
EGFRCR SERPLBLD CKD-EPI 2021: 18.9 ML/MIN/1.73
EOSINOPHIL # BLD AUTO: 0.04 10*3/MM3 (ref 0–0.4)
EOSINOPHIL NFR BLD AUTO: 0.4 % (ref 0.3–6.2)
ERYTHROCYTE [DISTWIDTH] IN BLOOD BY AUTOMATED COUNT: 12.8 % (ref 12.3–15.4)
GLUCOSE BLDC GLUCOMTR-MCNC: 164 MG/DL (ref 70–105)
GLUCOSE BLDC GLUCOMTR-MCNC: 176 MG/DL (ref 70–105)
GLUCOSE BLDC GLUCOMTR-MCNC: 233 MG/DL (ref 70–105)
GLUCOSE BLDC GLUCOMTR-MCNC: 279 MG/DL (ref 70–105)
GLUCOSE BLDC GLUCOMTR-MCNC: 298 MG/DL (ref 70–105)
GLUCOSE SERPL-MCNC: 171 MG/DL (ref 65–99)
HCT VFR BLD AUTO: 28.2 % (ref 37.5–51)
HGB BLD-MCNC: 9.1 G/DL (ref 13–17.7)
IMM GRANULOCYTES # BLD AUTO: 0.04 10*3/MM3 (ref 0–0.05)
IMM GRANULOCYTES NFR BLD AUTO: 0.4 % (ref 0–0.5)
LYMPHOCYTES # BLD AUTO: 0.84 10*3/MM3 (ref 0.7–3.1)
LYMPHOCYTES NFR BLD AUTO: 8.5 % (ref 19.6–45.3)
MCH RBC QN AUTO: 28.7 PG (ref 26.6–33)
MCHC RBC AUTO-ENTMCNC: 32.3 G/DL (ref 31.5–35.7)
MCV RBC AUTO: 89 FL (ref 79–97)
MONOCYTES # BLD AUTO: 1.27 10*3/MM3 (ref 0.1–0.9)
MONOCYTES NFR BLD AUTO: 12.8 % (ref 5–12)
NEUTROPHILS NFR BLD AUTO: 7.66 10*3/MM3 (ref 1.7–7)
NEUTROPHILS NFR BLD AUTO: 77.5 % (ref 42.7–76)
NRBC BLD AUTO-RTO: 0 /100 WBC (ref 0–0.2)
PLATELET # BLD AUTO: 181 10*3/MM3 (ref 140–450)
PMV BLD AUTO: 10.3 FL (ref 6–12)
POTASSIUM SERPL-SCNC: 4.8 MMOL/L (ref 3.5–5.2)
RBC # BLD AUTO: 3.17 10*6/MM3 (ref 4.14–5.8)
SODIUM SERPL-SCNC: 136 MMOL/L (ref 136–145)
WBC NRBC COR # BLD AUTO: 9.89 10*3/MM3 (ref 3.4–10.8)

## 2025-02-22 PROCEDURE — 25010000002 FUROSEMIDE PER 20 MG: Performed by: STUDENT IN AN ORGANIZED HEALTH CARE EDUCATION/TRAINING PROGRAM

## 2025-02-22 PROCEDURE — 85025 COMPLETE CBC W/AUTO DIFF WBC: CPT | Performed by: UROLOGY

## 2025-02-22 PROCEDURE — 25510000001 IOPAMIDOL PER 1 ML: Performed by: UROLOGY

## 2025-02-22 PROCEDURE — 99222 1ST HOSP IP/OBS MODERATE 55: CPT | Performed by: STUDENT IN AN ORGANIZED HEALTH CARE EDUCATION/TRAINING PROGRAM

## 2025-02-22 PROCEDURE — A9270 NON-COVERED ITEM OR SERVICE: HCPCS | Performed by: UROLOGY

## 2025-02-22 PROCEDURE — 82948 REAGENT STRIP/BLOOD GLUCOSE: CPT | Performed by: UROLOGY

## 2025-02-22 PROCEDURE — 80048 BASIC METABOLIC PNL TOTAL CA: CPT | Performed by: UROLOGY

## 2025-02-22 PROCEDURE — 63710000001 MINOXIDIL 2.5 MG TABLET: Performed by: UROLOGY

## 2025-02-22 PROCEDURE — 63710000001 ISOSORBIDE MONONITRATE 30 MG TABLET SUSTAINED-RELEASE 24 HOUR: Performed by: UROLOGY

## 2025-02-22 PROCEDURE — A9270 NON-COVERED ITEM OR SERVICE: HCPCS | Performed by: STUDENT IN AN ORGANIZED HEALTH CARE EDUCATION/TRAINING PROGRAM

## 2025-02-22 PROCEDURE — 94761 N-INVAS EAR/PLS OXIMETRY MLT: CPT

## 2025-02-22 PROCEDURE — 94799 UNLISTED PULMONARY SVC/PX: CPT

## 2025-02-22 PROCEDURE — 63710000001 PREDNISONE PER 1 MG: Performed by: STUDENT IN AN ORGANIZED HEALTH CARE EDUCATION/TRAINING PROGRAM

## 2025-02-22 PROCEDURE — 84156 ASSAY OF PROTEIN URINE: CPT | Performed by: HOSPITALIST

## 2025-02-22 PROCEDURE — 82043 UR ALBUMIN QUANTITATIVE: CPT | Performed by: HOSPITALIST

## 2025-02-22 PROCEDURE — 63710000001 CETIRIZINE 10 MG TABLET: Performed by: UROLOGY

## 2025-02-22 PROCEDURE — 63710000001 PANTOPRAZOLE 40 MG TABLET DELAYED-RELEASE: Performed by: UROLOGY

## 2025-02-22 PROCEDURE — 63710000001 CARVEDILOL 25 MG TABLET: Performed by: UROLOGY

## 2025-02-22 PROCEDURE — 82570 ASSAY OF URINE CREATININE: CPT | Performed by: HOSPITALIST

## 2025-02-22 PROCEDURE — 63710000001 INSULIN LISPRO (HUMAN) PER 5 UNITS: Performed by: UROLOGY

## 2025-02-22 PROCEDURE — 99222 1ST HOSP IP/OBS MODERATE 55: CPT | Performed by: INTERNAL MEDICINE

## 2025-02-22 PROCEDURE — 71045 X-RAY EXAM CHEST 1 VIEW: CPT

## 2025-02-22 PROCEDURE — 70496 CT ANGIOGRAPHY HEAD: CPT

## 2025-02-22 PROCEDURE — 63710000001 HYDROCODONE-ACETAMINOPHEN 10-325 MG TABLET: Performed by: STUDENT IN AN ORGANIZED HEALTH CARE EDUCATION/TRAINING PROGRAM

## 2025-02-22 PROCEDURE — 82948 REAGENT STRIP/BLOOD GLUCOSE: CPT

## 2025-02-22 PROCEDURE — 63710000001 SUCRALFATE 1 G TABLET: Performed by: UROLOGY

## 2025-02-22 PROCEDURE — 70450 CT HEAD/BRAIN W/O DYE: CPT

## 2025-02-22 PROCEDURE — 63710000001 HYDRALAZINE 10 MG TABLET: Performed by: UROLOGY

## 2025-02-22 PROCEDURE — 25010000002 FUROSEMIDE PER 20 MG: Performed by: INTERNAL MEDICINE

## 2025-02-22 PROCEDURE — 25810000003 SODIUM CHLORIDE 0.9 % SOLUTION: Performed by: HOSPITALIST

## 2025-02-22 PROCEDURE — 63710000001 AMLODIPINE 5 MG TABLET: Performed by: UROLOGY

## 2025-02-22 PROCEDURE — 0042T HC CT CEREBRAL PERFUSION W/WO CONTRAST: CPT

## 2025-02-22 PROCEDURE — 70498 CT ANGIOGRAPHY NECK: CPT

## 2025-02-22 RX ORDER — FUROSEMIDE 10 MG/ML
40 INJECTION INTRAMUSCULAR; INTRAVENOUS
Status: DISCONTINUED | OUTPATIENT
Start: 2025-02-23 | End: 2025-02-22

## 2025-02-22 RX ORDER — NICOTINE 21 MG/24HR
1 PATCH, TRANSDERMAL 24 HOURS TRANSDERMAL
Status: DISCONTINUED | OUTPATIENT
Start: 2025-02-22 | End: 2025-02-24 | Stop reason: HOSPADM

## 2025-02-22 RX ORDER — IOPAMIDOL 755 MG/ML
100 INJECTION, SOLUTION INTRAVASCULAR
Status: COMPLETED | OUTPATIENT
Start: 2025-02-22 | End: 2025-02-22

## 2025-02-22 RX ORDER — FUROSEMIDE 10 MG/ML
20 INJECTION INTRAMUSCULAR; INTRAVENOUS ONCE
Status: COMPLETED | OUTPATIENT
Start: 2025-02-22 | End: 2025-02-22

## 2025-02-22 RX ORDER — IOPAMIDOL 755 MG/ML
50 INJECTION, SOLUTION INTRAVASCULAR
Status: COMPLETED | OUTPATIENT
Start: 2025-02-22 | End: 2025-02-22

## 2025-02-22 RX ORDER — ATORVASTATIN CALCIUM 40 MG/1
40 TABLET, FILM COATED ORAL NIGHTLY
Status: DISCONTINUED | OUTPATIENT
Start: 2025-02-23 | End: 2025-02-24 | Stop reason: HOSPADM

## 2025-02-22 RX ORDER — FUROSEMIDE 10 MG/ML
20 INJECTION INTRAMUSCULAR; INTRAVENOUS ONCE
Status: DISCONTINUED | OUTPATIENT
Start: 2025-02-22 | End: 2025-02-22

## 2025-02-22 RX ORDER — SODIUM CHLORIDE 9 MG/ML
100 INJECTION, SOLUTION INTRAVENOUS CONTINUOUS
Status: DISCONTINUED | OUTPATIENT
Start: 2025-02-22 | End: 2025-02-23

## 2025-02-22 RX ORDER — FUROSEMIDE 10 MG/ML
40 INJECTION INTRAMUSCULAR; INTRAVENOUS
Status: DISCONTINUED | OUTPATIENT
Start: 2025-02-22 | End: 2025-02-23

## 2025-02-22 RX ORDER — PREDNISONE 20 MG/1
20 TABLET ORAL DAILY
Status: DISCONTINUED | OUTPATIENT
Start: 2025-02-22 | End: 2025-02-24 | Stop reason: HOSPADM

## 2025-02-22 RX ADMIN — HYDROCODONE BITARTRATE AND ACETAMINOPHEN 1 TABLET: 10; 325 TABLET ORAL at 10:56

## 2025-02-22 RX ADMIN — HYDROCODONE BITARTRATE AND ACETAMINOPHEN 1 TABLET: 10; 325 TABLET ORAL at 05:44

## 2025-02-22 RX ADMIN — SUCRALFATE 1 G: 1 TABLET ORAL at 08:26

## 2025-02-22 RX ADMIN — CARVEDILOL 25 MG: 25 TABLET, FILM COATED ORAL at 08:26

## 2025-02-22 RX ADMIN — ACETAMINOPHEN 650 MG: 325 TABLET, FILM COATED ORAL at 17:35

## 2025-02-22 RX ADMIN — INSULIN LISPRO 6 UNITS: 100 INJECTION, SOLUTION INTRAVENOUS; SUBCUTANEOUS at 22:40

## 2025-02-22 RX ADMIN — SODIUM CHLORIDE 100 ML/HR: 9 INJECTION, SOLUTION INTRAVENOUS at 22:29

## 2025-02-22 RX ADMIN — PANTOPRAZOLE SODIUM 40 MG: 40 TABLET, DELAYED RELEASE ORAL at 08:26

## 2025-02-22 RX ADMIN — PREDNISONE 20 MG: 20 TABLET ORAL at 14:11

## 2025-02-22 RX ADMIN — HYDRALAZINE HYDROCHLORIDE 10 MG: 10 TABLET ORAL at 17:26

## 2025-02-22 RX ADMIN — INSULIN LISPRO 2 UNITS: 100 INJECTION, SOLUTION INTRAVENOUS; SUBCUTANEOUS at 08:27

## 2025-02-22 RX ADMIN — IOPAMIDOL 50 ML: 755 INJECTION, SOLUTION INTRAVENOUS at 20:21

## 2025-02-22 RX ADMIN — FUROSEMIDE 20 MG: 10 INJECTION, SOLUTION INTRAMUSCULAR; INTRAVENOUS at 14:11

## 2025-02-22 RX ADMIN — INSULIN LISPRO 2 UNITS: 100 INJECTION, SOLUTION INTRAVENOUS; SUBCUTANEOUS at 12:33

## 2025-02-22 RX ADMIN — POLYETHYLENE GLYCOL 3350 34 G: 17 POWDER, FOR SOLUTION ORAL at 11:07

## 2025-02-22 RX ADMIN — FUROSEMIDE 20 MG: 10 INJECTION, SOLUTION INTRAMUSCULAR; INTRAVENOUS at 10:56

## 2025-02-22 RX ADMIN — CETIRIZINE HYDROCHLORIDE 10 MG: 10 TABLET, FILM COATED ORAL at 08:26

## 2025-02-22 RX ADMIN — CARVEDILOL 25 MG: 25 TABLET, FILM COATED ORAL at 17:25

## 2025-02-22 RX ADMIN — MINOXIDIL 2.5 MG: 2.5 TABLET ORAL at 08:26

## 2025-02-22 RX ADMIN — IPRATROPIUM BROMIDE AND ALBUTEROL SULFATE 3 ML: .5; 3 SOLUTION RESPIRATORY (INHALATION) at 14:52

## 2025-02-22 RX ADMIN — FUROSEMIDE 40 MG: 10 INJECTION, SOLUTION INTRAMUSCULAR; INTRAVENOUS at 20:38

## 2025-02-22 RX ADMIN — INSULIN LISPRO 8 UNITS: 100 INJECTION, SOLUTION INTRAVENOUS; SUBCUTANEOUS at 17:25

## 2025-02-22 RX ADMIN — HYDRALAZINE HYDROCHLORIDE 10 MG: 10 TABLET ORAL at 08:26

## 2025-02-22 RX ADMIN — ISOSORBIDE MONONITRATE 30 MG: 30 TABLET, EXTENDED RELEASE ORAL at 08:26

## 2025-02-22 RX ADMIN — NICOTINE 1 PATCH: 14 PATCH TRANSDERMAL at 12:34

## 2025-02-22 RX ADMIN — AMLODIPINE BESYLATE 5 MG: 5 TABLET ORAL at 08:26

## 2025-02-22 RX ADMIN — IOPAMIDOL 100 ML: 755 INJECTION, SOLUTION INTRAVENOUS at 21:09

## 2025-02-22 NOTE — CONSULTS
[facility]/ [facility]                                                        Nephrology Initial Consult Note    Patient Identification:  Name: Dave Peguero MRN: [id number]  Age: 84 y.o. : 1940  Sex: male  Date:2025    Requesting Physician: As per consult order.  Reason for Consultation: Acute kidney injury  Information from:patient/ family/ chart      History of Present Illness: This is a 84 y.o. year old male known to our service chronic kidney disease stage III and IV, patient cystoscopy transurethral resection of bladder tumor large.  Nephrology service was consulted after patient noted to have rising BUN and creatinine of 40 and 3.09.  As per records from previous consultation, patient has a baseline creatinine somewhere between 1.8 and 2.  No leg edema noted.  Patient noted on supplemental oxygen.  Chest x-ray done, awaited official report.  Patient noted on Lasix 40 IV 2 times a day.  Patient states he is voiding normal color urine.  He has not noticed any hematuria.  Last 2D echo revealed normal ejection fraction with diastolic dysfunction.    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     Atherosclerosis of native arteries of extremities with intermittent claudication, left leg     Cancer     breast cancer hx    Carotid artery stenosis     Cerebral vascular disease     CHF (congestive heart failure)     Coronary artery disease     Diabetes mellitus     type 1    Hyperlipidemia     Hypertension     Kidney stone     Myocardial infarction     x 3       Past Surgical History:  Past Surgical History:   Procedure Laterality Date     CARDIAC CATHETERIZATION      CORONARY ARTERY BYPASS GRAFT      TRIPLE    DENTAL PROCEDURE      all teeth have been pulled    FEMORAL ARTERY STENT Bilateral     JOINT REPLACEMENT Right     knee    PROSTATE SURGERY      TRANSURETHRAL RESECTION OF BLADDER TUMOR N/A 2/20/2025    Procedure: CYSTOSCOPY TRANSURETHRAL RESECTION OF BLADDER TUMOR;  Surgeon: Kevin Rodriguez MD;  Location: [facility] OR;  Service: Urology;  Laterality: N/A;        Home Meds:   Medications Prior to Admission   Medication Sig Dispense Refill Last Dose/Taking    amLODIPine (NORVASC) 5 MG tablet Take 1 tablet by mouth 2 (Two) Times a Day.   Taking    aspirin 81 MG EC tablet Take 1 tablet by mouth Daily.   Taking    atorvastatin (LIPITOR) 20 MG tablet Take 1 tablet by mouth Every Evening.   Taking    calcium carbonate (OS-RUTH) 600 MG tablet Take 1 tablet by mouth 2 (Two) Times a Day With Meals.   Taking    carvedilol (COREG) 25 MG tablet Take 1 tablet by mouth 2 (Two) Times a Day With Meals. 60 tablet 1 Taking    cetirizine (zyrTEC) 10 MG tablet Take 1 tablet by mouth Daily. 90 tablet 1 Taking    clopidogrel (PLAVIX) 75 MG tablet Take 1 tablet by mouth Daily.   Taking    hydrALAZINE (APRESOLINE) 10 MG tablet Take 1 tablet by mouth 3 (Three) Times a Day. 270 tablet 0 Taking    isosorbide mononitrate (IMDUR) 30 MG 24 hr tablet Take 1 tablet by mouth Daily. 30 tablet 1 Taking    minoxidil (LONITEN) 2.5 MG tablet Take 1 tablet by mouth Daily. 30 tablet 0 Taking    pantoprazole (PROTONIX) 40 MG EC tablet Take 1 tablet by mouth Daily.   Taking    sucralfate (CARAFATE) 1 g tablet Take 1 tablet by mouth Every Other Day.   Taking       Current Meds:   Current Facility-Administered Medications   Medication Dose Route Frequency Provider Last Rate Last Admin    acetaminophen (TYLENOL) tablet 650 mg  650 mg Oral Q4H PRN Kevin Rodriguez MD   650 mg at 02/22/25 4685    Or    acetaminophen (TYLENOL) suppository 650 mg  650 mg Rectal Q4H PRN Kevin Rodriguez,  MD        amLODIPine (NORVASC) tablet 5 mg  5 mg Oral BID Kevin Rodriguez MD   5 mg at 02/22/25 0826    atorvastatin (LIPITOR) tablet 20 mg  20 mg Oral Nightly Kevin Rordiguez MD   20 mg at 02/21/25 2158    carvedilol (COREG) tablet 25 mg  25 mg Oral BID With Meals Kevin Rodriguez MD   25 mg at 02/22/25 1725    cetirizine (zyrTEC) tablet 10 mg  10 mg Oral Daily Kevin Rodriguez MD   10 mg at 02/22/25 0826    dextrose (D50W) (25 g/50 mL) IV injection 25 g  25 g Intravenous Q15 Min PRN Naseem Kwok MD        dextrose (GLUTOSE) oral gel 15 g  15 g Oral Q15 Min PRN Naseem Kwok MD        furosemide (LASIX) injection 40 mg  40 mg Intravenous BID Diuretics Ryan Milton MD        glucagon (GLUCAGEN) injection 1 mg  1 mg Intramuscular Q15 Min PRN Naseem Kwok MD        hydrALAZINE (APRESOLINE) tablet 10 mg  10 mg Oral TID Kevin Rodriguez MD   10 mg at 02/22/25 1726    HYDROcodone-acetaminophen (NORCO)  MG per tablet 1 tablet  1 tablet Oral Q4H PRN Juan Manuel Carmen MD   1 tablet at 02/22/25 1056    insulin lispro (HUMALOG/ADMELOG) injection 2-9 Units  2-9 Units Subcutaneous 4x Daily AC & at Bedtime Kevin Rodriguez MD   8 Units at 02/22/25 1725    ipratropium-albuterol (DUO-NEB) nebulizer solution 3 mL  3 mL Nebulization Q4H PRN Salome Villalta APRN   3 mL at 02/22/25 1452    isosorbide mononitrate (IMDUR) 24 hr tablet 30 mg  30 mg Oral Q24H Kevin Rodriguez MD   30 mg at 02/22/25 0826    minoxidil (LONITEN) tablet 2.5 mg  2.5 mg Oral Q24H Kevin Rodriguez MD   2.5 mg at 02/22/25 0826    nicotine (NICODERM CQ) 14 MG/24HR patch 1 patch  1 patch Transdermal Q24H Juan Manuel Carmen MD   1 patch at 02/22/25 1234    ondansetron ODT (ZOFRAN-ODT) disintegrating tablet 4 mg  4 mg Oral Q6H PRN Kevin Rodriguez MD   4 mg at 02/21/25 1214    Or    ondansetron (ZOFRAN) injection 4 mg  4 mg Intravenous Q6H PRN Kevin Rodriguez MD        pantoprazole (PROTONIX) EC tablet 40 mg  40 mg Oral  Daily Kevin Rodriguez MD   40 mg at 02/22/25 0826    polyethylene glycol (MIRALAX) packet 34 g  34 g Oral Daily Juan Manuel Carmen MD   34 g at 02/22/25 1107    predniSONE (DELTASONE) tablet 20 mg  20 mg Oral Daily Juan Manuel Carmen MD   20 mg at 02/22/25 1411    sucralfate (CARAFATE) tablet 1 g  1 g Oral Every Other Day Kevin Rodriguez MD   1 g at 02/22/25 0826       Allergies:  Allergies   Allergen Reactions    Benadryl [Diphenhydramine] Unknown (See Comments)     Unknown allergy, but daughter is also allergic and states it causes throat swelling with her.       Social History:   Social History     Socioeconomic History    Marital status:    Tobacco Use    Smoking status: Former     Current packs/day: 0.50     Types: Cigarettes    Smokeless tobacco: Never   Vaping Use    Vaping status: Never Used   Substance and Sexual Activity    Alcohol use: No    Drug use: No    Sexual activity: Defer        Family History:  Family History   Problem Relation Age of Onset    COPD Mother     Heart disease Mother     Stroke Mother     Hypertension Mother     Cancer Father         Review of Systems: as per HPI, in addition:    General:       no weakness / fatigue,                       No fevers / chills                       no weight loss  HEENT;       no dysphagia or visual changes  Neck:           normal range of motion, no swelling  Respiratory: no cough / congestion                      No shortness of air                       No wheezing  CV:              No chest pain                       No palpitations  Abdomen/GI: no nausea / vomiting                      No diarrhea / constipation                      No abdominal pain  :             no dysuria / urinary frequency                       No urgency, normal output  Endocrine:   no polyuria / polydipsia,                      No heat or cold intolerance  Skin:           no rashes or skin lesions   Vascular:   No edema  Musculoskeletal: no joint pain or  "deformities      Physical Exam:  Vitals:   Temp (24hrs), Av.1 °F (36.7 °C), Min:97.5 °F (36.4 °C), Max:98.6 °F (37 °C)    /70   Pulse 98   Temp 98.6 °F (37 °C) (Oral)   Resp 20   Ht 175.3 cm (69\")   Wt 91.2 kg (201 lb)   SpO2 94%   BMI 29.68 kg/m²   Intake/Output:     Intake/Output Summary (Last 24 hours) at 2025 1756  Last data filed at 2025 1613  Gross per 24 hour   Intake 840 ml   Output 750 ml   Net 90 ml        Wt Readings from Last 1 Encounters:   25 1000 91.2 kg (201 lb)   25 1449 90.7 kg (200 lb)       Exam:    General Appearance:  Awake, alert, no acute distress  Good -appearing   Head and Face:  Normocephalic, atraumatic, mucous membranes moist, oropharynx clear   Eyes:  No icterus, pupils equal round and reactive to light, extraocular movements intact    ENMT: Moist mucosa, tongue symmetric    Neck: Supple  no jugular venous distention  no thyromegaly   Pulmonary:  Respiratory effort: Normal  Auscultation of lungs: Clear bilaterally  No wheezes  No rhonchi  Good air movement, good expansion   Chest wall:  No tenderness or deformity   Cardiovascular:  Auscultation of the heart: Normal rhythm, no murmurs  no edema of bilateral lower extremities   Abdomen:  Abdomen: soft, nontender, normal bowel sounds all four quadrants, no masses   Liver and spleen: no hepatosplenomegaly   Musculoskeletal: Digits and nails: normal  Normal range of motion  No joint swelling or gross deformities    Skin: Skin inspection: color normal, no visible rashes or lesions  Skin palpation: texture, turgor normal, no palpable lesions   Lymphatic:  no cervical lymphadenopathy    Psychiatric: Judgement and insight: normal  Orientation to person place and time: normal  Mood and affect: normal       DATA:  Radiology and Labs:  The following labs independently reviewed by me, additional AM labs ordered  Old records independently reviewed showing   The following radiologic studies independently viewed " by me, findings   Interval notes, chart personally reviewed by me.  I have reviewed and summarized old records as detailed above  Plan of care discussed with   New problems include   Dialysis patient access:     Risk/ complexity of medical care/ medical decision making:   Chronic illness with severe exacerbation or progression      Labs:   Recent Results (from the past 24 hours)   POC Glucose Once    Collection Time: 02/21/25  8:47 PM    Specimen: Blood   Result Value Ref Range    Glucose 236 (H) 70 - 105 mg/dL   BNP    Collection Time: 02/21/25 11:09 PM    Specimen: Arm, Left; Blood   Result Value Ref Range    proBNP 7,753.0 (H) 0.0 - 1,800.0 pg/mL   POC Glucose Once    Collection Time: 02/22/25  7:35 AM    Specimen: Blood   Result Value Ref Range    Glucose 164 (H) 70 - 105 mg/dL   Basic Metabolic Panel    Collection Time: 02/22/25 10:37 AM    Specimen: Blood   Result Value Ref Range    Glucose 171 (H) 65 - 99 mg/dL    BUN 40 (H) 8 - 23 mg/dL    Creatinine 3.09 (H) 0.76 - 1.27 mg/dL    Sodium 136 136 - 145 mmol/L    Potassium 4.8 3.5 - 5.2 mmol/L    Chloride 104 98 - 107 mmol/L    CO2 21.5 (L) 22.0 - 29.0 mmol/L    Calcium 8.8 8.6 - 10.5 mg/dL    BUN/Creatinine Ratio 12.9 7.0 - 25.0    Anion Gap 10.5 5.0 - 15.0 mmol/L    eGFR 18.9 (L) >60.0 mL/min/1.73   CBC Auto Differential    Collection Time: 02/22/25 10:37 AM    Specimen: Blood   Result Value Ref Range    WBC 9.89 3.40 - 10.80 10*3/mm3    RBC 3.17 (L) 4.14 - 5.80 10*6/mm3    Hemoglobin 9.1 (L) 13.0 - 17.7 g/dL    Hematocrit 28.2 (L) 37.5 - 51.0 %    MCV 89.0 79.0 - 97.0 fL    MCH 28.7 26.6 - 33.0 pg    MCHC 32.3 31.5 - 35.7 g/dL    RDW 12.8 12.3 - 15.4 %    RDW-SD 41.6 37.0 - 54.0 fl    MPV 10.3 6.0 - 12.0 fL    Platelets 181 140 - 450 10*3/mm3    Neutrophil % 77.5 (H) 42.7 - 76.0 %    Lymphocyte % 8.5 (L) 19.6 - 45.3 %    Monocyte % 12.8 (H) 5.0 - 12.0 %    Eosinophil % 0.4 0.3 - 6.2 %    Basophil % 0.4 0.0 - 1.5 %    Immature Grans % 0.4 0.0 - 0.5 %     Neutrophils, Absolute 7.66 (H) 1.70 - 7.00 10*3/mm3    Lymphocytes, Absolute 0.84 0.70 - 3.10 10*3/mm3    Monocytes, Absolute 1.27 (H) 0.10 - 0.90 10*3/mm3    Eosinophils, Absolute 0.04 0.00 - 0.40 10*3/mm3    Basophils, Absolute 0.04 0.00 - 0.20 10*3/mm3    Immature Grans, Absolute 0.04 0.00 - 0.05 10*3/mm3    nRBC 0.0 0.0 - 0.2 /100 WBC   POC Glucose 4x Daily Before Meals & at Bedtime    Collection Time: 02/22/25 11:57 AM    Specimen: Blood   Result Value Ref Range    Glucose 176 (H) 70 - 105 mg/dL   POC Glucose Once    Collection Time: 02/22/25  4:52 PM    Specimen: Blood   Result Value Ref Range    Glucose 233 (H) 70 - 105 mg/dL       Radiology:  Imaging Results (Last 24 Hours)       Procedure Component Value Units Date/Time    XR Chest 1 View [[id number]] Resulted: 02/22/25 0119     Updated: 02/22/25 0120                 ASSESSMENT / PLAN    Acute kidney injury likely from diuretic therapy in together with hemodynamic changes on chronic kidney disease stage III/1V proteinuria of 5.9 g on 2/11/2022   Patient with history of diabetes mellitus    Plan  Repeat albumin / protein creatinine ratio  Avoid NSAID  May have to back off on the diuretic therapy if noted rising further creatinine  Dose medication for GFR less than 15  Patient will benefit from RAAS blockade and SGLT 2 inhibitor if GFR improves about 25.  Will wait on acute kidney injury to improve before Introducing above regime.     Hypoxia  COPD   diastolic heart failure  Severe bradycardia status post resection  CAD  Diabetes mellitus        Continue to monitor electrolytes and volume closely, avoid IV contrast and nephrotoxic medications     I appreciate the consult request.  Please send me a secure chat message with any nonurgent questions regarding patient care.  For any urgent patient care issues please call my office number below.      Laura Fairchild MD  [facility]  Office phone number: [phone]  Answering service phone number:  [phone]      2/22/2025        Dictation via Dragon dictation software

## 2025-02-22 NOTE — PLAN OF CARE
Goal Outcome Evaluation:         Noted patient to have some wheezing and scattered coarse lung sounds, received a breathing treatment. Hospitalist was informed, BNP and chest xray were ordered. BNP elevated at 7,753- maintenance IV fluid was discontinued.

## 2025-02-22 NOTE — PROGRESS NOTES
POD 2 TURBT, large    Voiding better  AFVSS  90% on 2LNC  Respirations unlabored  S/NT/ND  Trace deana LE edema      NCCT with min mleft hydro  Cr 2.7 yesterday, pending today    UOP > 800 per nursing, multiple voids    BNP 7753    A/P:  POD 2 TURBT  F/U CBC, BMP  Elevated BNP - received dose of lasix, will defer to hospitalist and consult cards

## 2025-02-22 NOTE — PAYOR COMM NOTE
"Dave Arshad (84 y.o. Male)  1940  POLICY NO.   [id number]  Requesting IP Auth for Medical Admission  Admit Elective OP Procedure . Converted to IP Status 25    AUTHORIZATION PENDING  PLEASE FORWARD DETERMINATION TO FOLLOWING CONTACT:    ZHANG NOEL LPN UR  Utilization Review Nurse  [facility]  Direct & confidential phone # [phone]  Fax # [phone]           Date of Birth   1940    Social Security Number       Address   [address] [place] [address] [address] [place] [address]    Home Phone   [phone]    MRN   [id number]       Zoroastrian   Restoration    Marital Status                               Admission Date   25    Admission Type   Elective    Admitting Provider   Kevin Rodriguez MD    Attending Provider   Kevin Rodriguez MD    Department, Room/Bed   [facility] SURGICAL INPATIENT,        Discharge Date       Discharge Disposition       Discharge Destination                                 Attending Provider: Kevin Rodriguez MD    Allergies: Benadryl [Diphenhydramine]    Isolation: None   Infection: None   Code Status: No CPR    Ht: 175.3 cm (69\")   Wt: 91.2 kg (201 lb)    Admission Cmt: None   Principal Problem: Bladder cancer [C67.9]                   Active Insurance as of 2025       Primary Coverage       Payor Plan Insurance Group Employer/Plan Group    ANTHEM MEDICARE REPLACEMENT ANTHEM MED ADV HMO [id number]       Payor Plan Address Payor Plan Phone Number Payor Plan Fax Number Effective Dates    [id number] [phone]  2025 - None Entered    [facility] [id number]         Subscriber Name Subscriber Birth Date Member ID       DAVE ARSHAD 1940 [id number]                     Emergency Contacts        (Rel.) Home Phone Work Phone Mobile Phone    ArshadRosey taylor (Spouse) [phone] -- [phone]    Grisel Cody (Daughter) [phone] -- [phone]    Frances Davis (Daughter) -- -- " [phone]                 History & Physical        Kevin Rodriguez MD at 25 1204          Urology History and Physical    Patient:Dave Peguero  :1940   Room:[facility] MAIN OR/MAIN OR   Admit Date2025  Age:84 y.o.      SEX:male      DOS:2025      MR:[id number]      Visit:[id number]       Chief complaint bladder tumor    Subjective    Patient is a 84 y.o. male who presents with 6-month history of gross hematuria.  Patient underwent evaluation in November which revealed a 5 cm mass in his bladder on the right side of the trigone obscuring the ureteral orifice.  Patient needed cardiac clearance or to hold his anticoagulation and that is why his resection has been delayed. 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     Atherosclerosis of native arteries of extremities with intermittent claudication, left leg     Cancer     breast cancer hx    Carotid artery stenosis     Cerebral vascular disease     CHF (congestive heart failure)     Coronary artery disease     Diabetes mellitus     type 1    Hyperlipidemia     Hypertension     Kidney stone     Myocardial infarction     x 3     Past Surgical History:   Procedure Laterality Date    CARDIAC CATHETERIZATION      CORONARY ARTERY BYPASS GRAFT      TRIPLE    DENTAL PROCEDURE      all teeth have been pulled    FEMORAL ARTERY STENT Bilateral     JOINT REPLACEMENT Right     knee    PROSTATE SURGERY       Social History     Socioeconomic History    Marital status:    Tobacco Use    Smoking status: Former     Current packs/day: 0.50     Types: Cigarettes    Smokeless tobacco: Never   Vaping Use    Vaping status: Never Used   Substance and Sexual Activity    Alcohol use: No    Drug use: No     Sexual activity: Defer     Family History   Problem Relation Age of Onset    COPD Mother     Heart disease Mother     Stroke Mother     Hypertension Mother     Cancer Father      Allergy  Allergies   Allergen Reactions    Benadryl [Diphenhydramine] Unknown (See Comments)     Unknown allergy, but daughter is also allergic and states it causes throat swelling with her.     Prior to Admission medications    Medication Sig Start Date End Date Taking? Authorizing Provider   amLODIPine (NORVASC) 5 MG tablet Take 1 tablet by mouth 2 (Two) Times a Day. 24  Yes Sonal Harman MD   aspirin 81 MG EC tablet Take 1 tablet by mouth Daily.   Yes Sonal Harman MD   atorvastatin (LIPITOR) 20 MG tablet Take 1 tablet by mouth Every Evening.   Yes Sonal Harman MD   calcium carbonate (OS-RUTH) 600 MG tablet Take 1 tablet by mouth 2 (Two) Times a Day With Meals.   Yes Sonal Harman MD   carvedilol (COREG) 25 MG tablet Take 1 tablet by mouth 2 (Two) Times a Day With Meals. 25  Yes Brammell, Timothy Duane, MD   cetirizine (zyrTEC) 10 MG tablet Take 1 tablet by mouth Daily. 5/10/17  Yes Franc Lira Jr., MD   clopidogrel (PLAVIX) 75 MG tablet Take 1 tablet by mouth Daily.   Yes Sonal Harman MD   hydrALAZINE (APRESOLINE) 10 MG tablet Take 1 tablet by mouth 3 (Three) Times a Day. 25  Yes Brammell, Timothy Duane, MD   isosorbide mononitrate (IMDUR) 30 MG 24 hr tablet Take 1 tablet by mouth Daily. 25  Yes Brammell, Timothy Duane, MD   minoxidil (LONITEN) 2.5 MG tablet Take 1 tablet by mouth Daily. 25  Yes Brammell, Timothy Duane, MD   pantoprazole (PROTONIX) 40 MG EC tablet Take 1 tablet by mouth Daily.   Yes Sonal Harman MD   sucralfate (CARAFATE) 1 g tablet Take 1 tablet by mouth Every Other Day.   Yes Sonal Haramn MD         Objective    tMax 24 hours:  Temp (24hrs), Av.2 °F (36.8 °C), Min:98.2 °F (36.8 °C), Max:98.2 °F (36.8 °C)    Vital Sign  "Ranges:  Temp:  [98.2 °F (36.8 °C)] 98.2 °F (36.8 °C)  Heart Rate:  [79] 79  Resp:  [18] 18  BP: (160)/(63) 160/63  Intake and Output Last 3 Shifts:  No intake/output data recorded.    Physical Exam:   General Appearance alert, appears stated age, and cooperative  Head normocephalic, without obvious abnormality and atraumatic  Lungs respirations regular, respirations even, and respirations unlabored  Heart regular rhythm & normal rate  Abdomen soft non-tender, no guarding, and no rebound tenderness  Skin no bleeding, bruising or rash  Neurologic Mental Status orientated to person, place, time and situation    Results Review:     Lab Results (last 24 hours)       Procedure Component Value Units Date/Time    POC Glucose STAT [[id number]]  (Abnormal) Collected: 02/20/25 1142    Specimen: Blood Updated: 02/20/25 1145     Glucose 208 mg/dL      Comment: Serial Number: [id number]:  [id number]       POC Glucose Once [[id number]]  (Abnormal) Collected: 02/20/25 1000    Specimen: Blood Updated: 02/20/25 1004     Glucose 241 mg/dL      Comment: Serial Number: [id number]:  [id number]              No results found for: \"URINECX\"     Imaging Results (Last 7 Days)       ** No results found for the last 168 hours. **            Inpatient Meds:   Scheduled Meds:ceFAZolin 2000 mg IVPB in 100 mL NS (MBP), 2,000 mg, Intravenous, Once       Continuous Infusions:    PRN Meds:.  lidocaine PF 1%    sodium chloride      Assessment & Plan    Active Problems:    * No active hospital problems. *    Large bladder mass  Gross hematuria  Coronary artery disease with anticoagulation which has been held    Plan  Cystoscopy, transurethral section of bladder tumor.  Risk, benefits, alternatives have been discussed with the patient and his family they wish to proceed.      I discussed the patient's findings and my recommendations with patient and family    Kevin Rodriguez MD  02/20/25  12:05 EST          Electronically signed by Michael, " Kevin RASCON MD at 02/20/25 1208          Operative/Procedure Notes (last 5 days)        Kevin Rodrigeuz MD at 02/20/25 1226          CYSTOSCOPY TRANSURETHRAL RESECTION OF BLADDER TUMOR  Procedure Report    Patient Name:  Dave Peguero  YOB: 1940    Date of Surgery:  2/20/2025     Indications: 84-year-old gentleman with gross hematuria secondary to a large bladder tumor.  He now presents for treatment of this.    Pre-op Diagnosis:   Bladder tumor [D49.4]  BPH with elevated PSA and lower urinary tract symptoms [N40.1, R97.20]       Post-Op Diagnosis Codes:     * Bladder tumor [D49.4]     * BPH with elevated PSA and lower urinary tract symptoms [N40.1, R97.20]    Procedure/CPT® Codes:  MA CYSTOURETHROSCOPY W/DEST &/RMVL TUMOR LARGE [22054]    Procedure(s):  CYSTOSCOPY TRANSURETHRAL RESECTION OF BLADDER TUMOR, large    Staff:  Surgeon(s):  Kevin Rodriguez MD            was responsible for performing the following activities:  None  and their skilled assistance was necessary for the success of this case.    Anesthesia: General    Estimated Blood Loss:  30 mL    Implants:    Nothing was implanted during the procedure    Specimen:          ID Type Source Tests Collected by Time   A : SUPERFICIAL BLADDER TUMOR Tissue Urinary Bladder TISSUE PATHOLOGY EXAM Kevin Rordiguez MD 2/20/2025 1256   B : DEEP BLADDER TUMOR Tissue Urinary Bladder TISSUE PATHOLOGY EXAM Kevin Rodriguez MD 2/20/2025 1333         Findings: Bladder tumor greater than 5 cm on the right lateral wall.  This was overlying the ureteral orifice but once it had been resected ureter office was completely uninvolved.  There was also median 3 cm tumor on the left lateral wall.    Complications: None    Description of Procedure: Patient induced with general anesthesia and placed in dorsolithotomy position.  Prepped and draped in sterile fashion.  26 Sami continuous-flow resectoscope was placed under direct vision.  Urethra is within normal  limits.  Prostate shows some apical tissue but it has been resected in the past and is actually fairly wide open.  Large bladder tumor from the right wall greater than 5 cm in tumor.  Because of this I could not see the ureteral orifices and actually at the penectomy I could not see the tumor on the left side because the right-sided bladder tumor was filling out bladder.  Tumor was resected down to just above the bladder wall.  The other tumors on the left side was also resected and all of these chips were removed and sent as superficial bladder tumor.  Resection was then performed of the base of both tumors until only normal tissue was seen.  No evidence of residual cancer was seen.  Hemostasis was obtained using cautery.  Both ureteral orifices were visualized at the end of the procedure and they were intact.  The resectoscope was removed and a 24 Malay three-way Nolen catheter was placed.  The patient was taken out of the dorsolithotomy position and awakened from general anesthesia.  He was transported to the postanesthesia care unit stable condition having Toller the procedure well without any complications.      Kevin Rodriguez MD     Date: 2/20/2025  Time: 13:49 EST        Electronically signed by Kevin Rodriguez MD at 02/20/25 1352          Physician Progress Notes (last 48 hours)        Ryan Sequeira MD at 02/22/25 0903          POD 2 TURBT, large    Voiding better  AFVSS  90% on 2LNC  Respirations unlabored  S/NT/ND  Trace deana LE edema      NCCT with min mleft hydro  Cr 2.7 yesterday, pending today    UOP > 800 per nursing, multiple voids    BNP 7753    A/P:  POD 2 TURBT  F/U CBC, BMP  Elevated BNP - received dose of lasix, will defer to hospitalist and consult cards      Electronically signed by Ryan Sequeira MD at 02/22/25 0908       Kevin Rodriguez MD at 02/21/25 1811          Urology Progress Note    Patient Identification:  Name:  Dave Peguero  Age:  84 y.o.  Sex:   male  :  1940  MRN:  [id number]    Chief Complaint:  Patient complains of left sided pain as well as poor urine output    History of Present Illness: Patient did void a very small amount.  Postvoid residual is 131 mL.  CT scan was performed which reveals some mild left hydronephrosis.  I suspect this is due to edema from resection on the left side of the bladder near the ureteral orifice though the ureteral orifice was clearly intact at the time of the procedure.    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     Atherosclerosis of native arteries of extremities with intermittent claudication, left leg     Cancer     breast cancer hx    Carotid artery stenosis     Cerebral vascular disease     CHF (congestive heart failure)     Coronary artery disease     Diabetes mellitus     type 1    Hyperlipidemia     Hypertension     Kidney stone     Myocardial infarction     x 3     Past Surgical History:  Past Surgical History:   Procedure Laterality Date    CARDIAC CATHETERIZATION      CORONARY ARTERY BYPASS GRAFT      TRIPLE    DENTAL PROCEDURE      all teeth have been pulled    FEMORAL ARTERY STENT Bilateral     JOINT REPLACEMENT Right     knee    PROSTATE SURGERY      TRANSURETHRAL RESECTION OF BLADDER TUMOR N/A 2025    Procedure: CYSTOSCOPY TRANSURETHRAL RESECTION OF BLADDER TUMOR;  Surgeon: Kevin Rodriguez MD;  Location: [facility] OR;  Service: Urology;  Laterality: N/A;     Home Meds:  Medications Prior to Admission   Medication Sig Dispense Refill Last Dose/Taking    amLODIPine (NORVASC) 5 MG tablet Take 1 tablet by mouth 2 (Two) Times a Day.   Taking    aspirin 81 MG EC tablet Take 1 tablet by mouth Daily.   Taking    atorvastatin (LIPITOR) 20 MG tablet Take 1 tablet by mouth Every Evening.   Taking    calcium carbonate (OS-RUTH)  600 MG tablet Take 1 tablet by mouth 2 (Two) Times a Day With Meals.   Taking    carvedilol (COREG) 25 MG tablet Take 1 tablet by mouth 2 (Two) Times a Day With Meals. 60 tablet 1 Taking    cetirizine (zyrTEC) 10 MG tablet Take 1 tablet by mouth Daily. 90 tablet 1 Taking    clopidogrel (PLAVIX) 75 MG tablet Take 1 tablet by mouth Daily.   Taking    hydrALAZINE (APRESOLINE) 10 MG tablet Take 1 tablet by mouth 3 (Three) Times a Day. 270 tablet 0 Taking    isosorbide mononitrate (IMDUR) 30 MG 24 hr tablet Take 1 tablet by mouth Daily. 30 tablet 1 Taking    minoxidil (LONITEN) 2.5 MG tablet Take 1 tablet by mouth Daily. 30 tablet 0 Taking    pantoprazole (PROTONIX) 40 MG EC tablet Take 1 tablet by mouth Daily.   Taking    sucralfate (CARAFATE) 1 g tablet Take 1 tablet by mouth Every Other Day.   Taking     Current Meds:    Current Facility-Administered Medications:     acetaminophen (TYLENOL) tablet 650 mg, 650 mg, Oral, Q4H PRN, 650 mg at 02/21/25 0740 **OR** acetaminophen (TYLENOL) suppository 650 mg, 650 mg, Rectal, Q4H PRN, Kevin Rodriguez MD    amLODIPine (NORVASC) tablet 5 mg, 5 mg, Oral, BID, Kevin Rodriguez MD, 5 mg at 02/21/25 0829    atorvastatin (LIPITOR) tablet 20 mg, 20 mg, Oral, Nightly, Kevin Rodriguez MD, 20 mg at 02/20/25 2025    carvedilol (COREG) tablet 25 mg, 25 mg, Oral, BID With Meals, Keivn Rodriguez MD, 25 mg at 02/21/25 0830    cetirizine (zyrTEC) tablet 10 mg, 10 mg, Oral, Daily, Kevin Rodriguez MD, 10 mg at 02/21/25 0830    dextrose (D50W) (25 g/50 mL) IV injection 25 g, 25 g, Intravenous, Q15 Min PRN, Naseem Kwok MD    dextrose (GLUTOSE) oral gel 15 g, 15 g, Oral, Q15 Min PRN, Naseem Kwok MD    glucagon (GLUCAGEN) injection 1 mg, 1 mg, Intramuscular, Q15 Min PRN, Naseem Kwok MD    hydrALAZINE (APRESOLINE) tablet 10 mg, 10 mg, Oral, TID, Kevin Rodriguez MD, 10 mg at 02/21/25 0830    HYDROcodone-acetaminophen (NORCO)  MG per tablet 1 tablet, 1 tablet,  "Oral, Q4H PRN, Juan Manuel Carmen MD    insulin lispro (HUMALOG/ADMELOG) injection 2-9 Units, 2-9 Units, Subcutaneous, 4x Daily AC & at Bedtime, Kevin Rodriguez MD, 4 Units at 25 1214    ipratropium-albuterol (DUO-NEB) nebulizer solution 1.5 mL, 1.5 mL, Nebulization, Q4H PRN, Naseem Kwok MD, 1.5 mL at 25 0445    isosorbide mononitrate (IMDUR) 24 hr tablet 30 mg, 30 mg, Oral, Q24H, Kevin Rodriguez MD, 30 mg at 25 0830    minoxidil (LONITEN) tablet 2.5 mg, 2.5 mg, Oral, Q24H, Kevin Rodriguez MD, 2.5 mg at 25 0830    ondansetron ODT (ZOFRAN-ODT) disintegrating tablet 4 mg, 4 mg, Oral, Q6H PRN, 4 mg at 25 1214 **OR** ondansetron (ZOFRAN) injection 4 mg, 4 mg, Intravenous, Q6H PRN, Kevin Rodriguez MD    pantoprazole (PROTONIX) EC tablet 40 mg, 40 mg, Oral, Daily, Kevin Rodriguez MD, 40 mg at 25 0830    polyethylene glycol (MIRALAX) packet 34 g, 34 g, Oral, Daily, Juan Manuel Carmen MD, 34 g at 25 1215    sodium chloride 0.9 % infusion, 100 mL/hr, Intravenous, Continuous, Kevin Rodriguez MD    sucralfate (CARAFATE) tablet 1 g, 1 g, Oral, Every Other Day, Kevin Rodriguez MD  Allergies:  Benadryl [diphenhydramine]    Review of Systems     Objective:  tMax 24 hours:  Temp (24hrs), Av °F (36.7 °C), Min:97.9 °F (36.6 °C), Max:98.2 °F (36.8 °C)    Vital Sign Ranges:  Temp:  [97.9 °F (36.6 °C)-98.2 °F (36.8 °C)] 98 °F (36.7 °C)  Heart Rate:  [89-98] 89  Resp:  [14-18] 17  BP: (149-169)/(53-74) 163/74  Intake and Output Last 3 Shifts:  I/O last 3 completed shifts:  In: 1088 [P.O.:480; I.V.:608]  Out: -2300     Exam:  /74 (BP Location: Left arm, Patient Position: Sitting)   Pulse 89   Temp 98 °F (36.7 °C) (Oral)   Resp 17   Ht 175.3 cm (69\")   Wt 91.2 kg (201 lb)   SpO2 91%   BMI 29.68 kg/m²    General Appearance:    Alert, cooperative, no acute distress, general         appearance is normal   Head:    Normocephalic, without obvious abnormality, atraumatic " "  Eyes:            Pupils/Irises normal. Exterior inspection conjunctivae       and lids normal.   Ears:    Normal external inspection   Nose:   Exterior inspection of nose is normal   Throat:   Lips, mucosa, and tongue normal   Lungs:     Respirations unlabored; normal effort, no audible     abnormality   CV:   Regular rhythm and normal rate, no edema   Abdomen:     examination of the abdomen is normal with     no masses, tenderness, or distension    :        Data Review:  All labs (24hrs):    Lab Results (last 24 hours)       Procedure Component Value Units Date/Time    POC Glucose Once [[id number]]  (Abnormal) Collected: 02/21/25 1637    Specimen: Blood Updated: 02/21/25 1639     Glucose 168 mg/dL      Comment: Serial Number: [id number]:  [id number]       Tissue Pathology Exam [[id number]] Collected: 02/20/25 1256    Specimen: Tissue from Urinary Bladder, Tissue from Urinary Bladder Updated: 02/21/25 0325     Case Report --     Surgical Pathology Report                         Case: [id number]                                  Authorizing Provider:  Kevin Rodriguez MD       Collected:           02/20/2025 12:56 PM          Ordering Location:     [facility] MAIN  Received:            02/20/2025 02:13 PM                                 OR                                                                           Pathologist:           Luis Rod MD                                                            Specimens:   1) - Urinary Bladder, SUPERFICIAL BLADDER TUMOR                                                     2) - Urinary Bladder, DEEP BLADDER TUMOR                                                    Final Diagnosis --     Specimen 1 (\"superficial bladder tumor\", transurethral resection):  Invasive high-grade urothelial carcinoma  Tumor invades subepithelial connective tissue  No definitive muscularis propria identified  See synoptic report for additional details    Specimen 2 (\"deep " "bladder tumor\", transurethral resection):  Invasive high-grade urothelial carcinoma  Tumor invades subepithelial connective tissue  Muscularis propria present and uninvolved by tumor  See synoptic report for additional details    HOLDEN       Synoptic Checklist --     URINARY BLADDER: Biopsy and Transurethral Resection of Bladder Tumor (TURBT)  URINARY BLADDER: BIOPSY AND TRANSURETHRAL RESECTION OF BLADDER TUMOR (TURBT) - 1  Protocol posted: 9/20/2023    SPECIMEN     Procedure:    Transurethral resection of bladder (TURBT)     TUMOR     Tumor Site:    Not specified      Histologic Type:    Urothelial carcinoma, invasive (conventional)      Histologic Grade:    High-grade      Tumor Extent:    Invades lamina propria (subepithelial connective tissue): Extensive      Lymphatic and / or Vascular Invasion:    Not identified      Tumor Configuration:    Papillary      Muscularis Propria (detrusor muscle):    Not identified     ADDITIONAL FINDINGS     Associated Epithelial Lesions:    None identified   URINARY BLADDER: Biopsy and Transurethral Resection of Bladder Tumor (TURBT)  URINARY BLADDER: BIOPSY AND TRANSURETHRAL RESECTION OF BLADDER TUMOR (TURBT) - 2  Protocol posted: 9/20/2023    SPECIMEN     Procedure:    Transurethral resection of bladder (TURBT)     TUMOR     Tumor Site:    Not specified      Histologic Type:    Urothelial carcinoma, invasive (conventional)      Histologic Grade:    High-grade      Tumor Extent:    Invades lamina propria (subepithelial connective tissue): Extensive      Lymphatic and / or Vascular Invasion:    Not identified      Tumor Configuration:    Papillary      Muscularis Propria (detrusor muscle):    Present in specimen     ADDITIONAL FINDINGS     Associated Epithelial Lesions:    None identified        Gross Description --     1. Urinary Bladder.  Received in formalin designated superficial bladder tumor are multiple irregular fragments of pink, somewhat friable tissue measuring 6 x 6 x " 3.5 cm in aggregate.  Representative tissue comprising approximately 60% of the specimen is submitted in cassette A3J.  HOLDEN    2. Urinary Bladder.  Received in formalin designated deep bladder tumor are multiple fragments of reddish, somewhat friable tissue measuring 2.5 x 2 x 0.3 cm in aggregate.  Submitted in 1 cassette.  HOLDEN        POC Glucose 4x Daily Before Meals & at Bedtime [[id number]]  (Abnormal) Collected: 02/21/25 1131    Specimen: Blood Updated: 02/21/25 1133     Glucose 230 mg/dL      Comment: Serial Number: [id number]:  [id number]       POC Glucose 4x Daily Before Meals & at Bedtime [[id number]]  (Abnormal) Collected: 02/21/25 0801    Specimen: Blood Updated: 02/21/25 0803     Glucose 161 mg/dL      Comment: Serial Number: [id number]:  [id number]       POC Glucose Once [[id number]]  (Abnormal) Collected: 02/21/25 0009    Specimen: Blood Updated: 02/21/25 0010     Glucose 262 mg/dL      Comment: Serial Number: [id number]:  [id number]       Comprehensive Metabolic Panel [[id number]]  (Abnormal) Collected: 02/20/25 2229    Specimen: Blood from Arm, Left Updated: 02/20/25 2302     Glucose 368 mg/dL      BUN 40 mg/dL      Creatinine 2.74 mg/dL      Sodium 136 mmol/L      Potassium 4.9 mmol/L      Chloride 104 mmol/L      CO2 21.4 mmol/L      Calcium 9.0 mg/dL      Total Protein 5.4 g/dL      Albumin 3.4 g/dL      ALT (SGPT) 13 U/L      AST (SGOT) 17 U/L      Alkaline Phosphatase 71 U/L      Total Bilirubin <0.2 mg/dL      Globulin 2.0 gm/dL      A/G Ratio 1.7 g/dL      BUN/Creatinine Ratio 14.6     Anion Gap 10.6 mmol/L      eGFR 21.9 mL/min/1.73     Narrative:      GFR Categories in Chronic Kidney Disease (CKD)      GFR Category          GFR (mL/min/1.73)    Interpretation  G1                     90 or greater         Normal or high (1)  G2                      60-89                Mild decrease (1)  G3a                   45-59                Mild to moderate decrease  G3b                    30-44                Moderate to severe decrease  G4                    15-29                Severe decrease  G5                    14 or less           Kidney failure          (1)In the absence of evidence of kidney disease, neither GFR category G1 or G2 fulfill the criteria for CKD.    eGFR calculation 2021 CKD-EPI creatinine equation, which does not include race as a factor    CBC & Differential [[id number]]  (Abnormal) Collected: 02/20/25 2229    Specimen: Blood from Arm, Left Updated: 02/20/25 2239    Narrative:      The following orders were created for panel order CBC & Differential.  Procedure                               Abnormality         Status                     ---------                               -----------         ------                     CBC Auto Differential[[id number]]        Abnormal            Final result                 Please view results for these tests on the individual orders.    CBC Auto Differential [[id number]]  (Abnormal) Collected: 02/20/25 2229    Specimen: Blood from Arm, Left Updated: 02/20/25 2239     WBC 8.51 10*3/mm3      RBC 3.02 10*6/mm3      Hemoglobin 8.7 g/dL      Hematocrit 27.0 %      MCV 89.4 fL      MCH 28.8 pg      MCHC 32.2 g/dL      RDW 12.7 %      RDW-SD 41.7 fl      MPV 10.7 fL      Platelets 166 10*3/mm3      Neutrophil % 87.6 %      Lymphocyte % 6.5 %      Monocyte % 5.6 %      Eosinophil % 0.0 %      Basophil % 0.1 %      Immature Grans % 0.2 %      Neutrophils, Absolute 7.45 10*3/mm3      Lymphocytes, Absolute 0.55 10*3/mm3      Monocytes, Absolute 0.48 10*3/mm3      Eosinophils, Absolute 0.00 10*3/mm3      Basophils, Absolute 0.01 10*3/mm3      Immature Grans, Absolute 0.02 10*3/mm3      nRBC 0.0 /100 WBC     POC Glucose Once [[id number]]  (Abnormal) Collected: 02/20/25 2112    Specimen: Blood Updated: 02/20/25 2113     Glucose 381 mg/dL      Comment: Serial Number: [id number]:  [id number]             Radiology:   Imaging Results (Last 72 Hours)        Procedure Component Value Units Date/Time    CT Abdomen Pelvis Without Contrast [[id number]] Collected: 02/21/25 1629     Updated: 02/21/25 1637    Narrative:      CT ABDOMEN PELVIS WO CONTRAST    Date of Exam: 2/21/2025 4:26 PM EST    Indication: Abdominal pain.    Comparison: None available.    Technique: Axial CT images were obtained of the abdomen and pelvis without the administration of contrast. Sagittal and coronal reconstructions were performed.  Automated exposure control and iterative reconstruction methods were used.      Findings:    Liver: The liver is unremarkable in morphology. Evaluation for focal liver lesions is limited without IV contrast. No biliary dilation is seen.    Gallbladder: Unremarkable.    Pancreas: Unremarkable.    Spleen: Multiple splenic granulomas.    Adrenal glands: Unremarkable.    Genitourinary tract: There is bladder wall thickening/indistinctness, concerning for cystitis. Small amount of air is seen within the bladder lumen which may be related to recent instrumentation or infection with gas-forming organism. There is mild left   hydronephrosis without obstructing calculus or mass identified. This could represent ascending infection or recently passed calculus. There are bilateral renal hilar vascular calcifications. Additional nonobstructing calculi measuring up to 5 mm cannot   be excluded. Right ureter is decompressed. Prostate gland is unremarkable.    Gastrointestinal tract: Limited evaluation of the hollow viscera due to lack of IV contrast administration. Colonic diverticulosis. Small hiatal hernia. No evidence of bowel obstruction    Appendix: No findings to suggest acute appendicitis.    Other findings: No free air or free fluid is identified. No pathologically enlarged lymph nodes are seen. Vascular calcifications are present.    Bones and soft tissues: No acute or suspicious osseous or soft tissue lesion is identified. Fat-containing umbilical hernia.    Lung  bases: Bilateral lower lobe opacities with interlobular septal thickening in the left lower lobe and small bilateral pleural effusions      Impression:      Impression:  1.There is bladder wall thickening/indistinctness, concerning for cystitis. Small amount of air is seen within the bladder lumen which may be related to recent instrumentation or infection with gas-forming organism. Please correlate with urinalysis.  2.There is mild left hydronephrosis without obstructing calculus or mass identified. This could represent ascending infection or recently passed calculus.  3.Bilateral renal hilar vascular calcifications. Additional nonobstructive nephrolithiasis cannot be entirely excluded.  4.Bilateral lung base opacities may represent edema or infiltrates. Small bilateral pleural effusions are partially imaged.  5.Additional findings as detailed above.        Electronically Signed: Enrike Coleman MD    2025 4:35 PM EST    Workstation ID: [id number]            Assessment/Plan:    Principal Problem:    Bladder cancer    Bladder cancer status post TURBT yesterday  Mild left hydronephrosis likely due to edema from the resection  Left flank plain  Poor urine output    Plan  IV and oral hydration  Cancel discharge  If patient is doing better then discharged home tomorrow  If patient continues to have significant left flank pain he may need a stent placement      Kevin Rodriguez MD  2025  18:11 EST        Electronically signed by Kevni Rodriguez MD at 25 1813       Juan Manuel Carmen MD at 25 1202              [facility] MEDICINE SERVICE  DAILY PROGRESS NOTE    NAME: Dave Peguero  : 1940  MRN: [id number]      LOS: 0 days     PROVIDER OF SERVICE: Juan Manuel Carmen MD    Chief Complaint: Bladder cancer    Subjective:     Interval History:  History taken from: patient    Somewhat painful today.        Review of Systems:   Review of Systems    Objective:     Vital Signs  Temp:  [97.7 °F (36.5  °C)-98.2 °F (36.8 °C)] 97.9 °F (36.6 °C)  Heart Rate:  [65-98] 89  Resp:  [14-21] 17  BP: (148-185)/(53-76) 169/61  Flow (L/min) (Oxygen Therapy):  [2-6] 2   Body mass index is 29.68 kg/m².    Physical Exam  Physical Exam  Cardiovascular:      Rate and Rhythm: Normal rate and regular rhythm.      Pulses: Normal pulses.      Heart sounds: Normal heart sounds.   Pulmonary:      Effort: Pulmonary effort is normal.      Breath sounds: Normal breath sounds.   Abdominal:      General: Abdomen is flat.      Palpations: Abdomen is soft.   Neurological:      Mental Status: He is alert.            Diagnostic Data    Results from last 7 days   Lab Units 02/20/25  2229   WBC 10*3/mm3 8.51   HEMOGLOBIN g/dL 8.7*   HEMATOCRIT % 27.0*   PLATELETS 10*3/mm3 166   GLUCOSE mg/dL 368*   CREATININE mg/dL 2.74*   BUN mg/dL 40*   SODIUM mmol/L 136   POTASSIUM mmol/L 4.9   AST (SGOT) U/L 17   ALT (SGPT) U/L 13   ALK PHOS U/L 71   BILIRUBIN mg/dL <0.2   ANION GAP mmol/L 10.6       No radiology results for the last day      I reviewed the patient's new clinical results.    Assessment/Plan:     Active and Resolved Problems  Active Hospital Problems    Diagnosis  POA    **Bladder cancer [C67.9]  Yes      Resolved Hospital Problems   No resolved problems to display.       Bladder mass  -UA as of 2/10/2025: 21-50 RBCs, 6-10 WBCs, nitrite negative, bacteria negative  -Urine protein creatinine ratio 5, 969  -Postop day 1 of cystoscopy transurethral resection of bladder tumor  -Pain control    Constipation  -MiraLAX ordered, suppository as available.     CKD  Trend renal function labs.     CAD  PVD  HLD  -Continue statin, metoprolol.  Hold Plavix and ASA.    -Appreciate urology's recommendations on when to resume AC     DM2  -Accu-Cheks before meals and at bedtime  -SSI while inpatient  -CCD     Tobacco use  -Tobacco cessation  -Nicotine patch       VTE Prophylaxis:  Mechanical VTE prophylaxis orders are present.         Disposition Planning:      Barriers to Discharge: Improvement in pain, bowel movement  Anticipated Date of Discharge: 2/22/2025  Place of Discharge: Home      Time: 33 minutes     Code Status and Medical Interventions: No CPR (Do Not Attempt to Resuscitate); Limited Support; No intubation (DNI)   Ordered at: 02/20/25 1719     Medical Intervention Limits:    No intubation (DNI)     Level Of Support Discussed With:    Patient     Code Status (Patient has no pulse and is not breathing):    No CPR (Do Not Attempt to Resuscitate)     Medical Interventions (Patient has pulse or is breathing):    Limited Support       Signature: Electronically signed by Juan Manuel Carmen MD, 02/21/25, 12:02 [facility].  [facility] Hospitalist Team      Electronically signed by Juan Manuel Carmen MD at 02/21/25 3093

## 2025-02-22 NOTE — PROGRESS NOTES
[facility] MEDICINE SERVICE  DAILY PROGRESS NOTE    NAME: Dave Peguero  : 1940  MRN: [id number]      LOS: 0 days     PROVIDER OF SERVICE: Juan Manuel Carmen MD    Chief Complaint: Bladder cancer    Subjective:     Interval History:  History taken from: patient    Improved pain, a little agitated asking for nicotine patch.        Review of Systems:   Review of Systems    Objective:     Vital Signs  Temp:  [97.5 °F (36.4 °C)-98.6 °F (37 °C)] 98.6 °F (37 °C)  Heart Rate:  [80-98] 98  Resp:  [16-25] 16  BP: (145-176)/(61-92) 145/69  Flow (L/min) (Oxygen Therapy):  [2] 2   Body mass index is 29.68 kg/m².    Physical Exam  Physical Exam  Cardiovascular:      Rate and Rhythm: Normal rate and regular rhythm.      Pulses: Normal pulses.      Heart sounds: Normal heart sounds.   Pulmonary:      Effort: Pulmonary effort is normal.      Breath sounds: Normal breath sounds.   Abdominal:      General: Abdomen is flat.      Palpations: Abdomen is soft.   Neurological:      Mental Status: He is alert.            Diagnostic Data    Results from last 7 days   Lab Units 25  1037 25  2229   WBC 10*3/mm3 9.89 8.51   HEMOGLOBIN g/dL 9.1* 8.7*   HEMATOCRIT % 28.2* 27.0*   PLATELETS 10*3/mm3 181 166   GLUCOSE mg/dL 171* 368*   CREATININE mg/dL 3.09* 2.74*   BUN mg/dL 40* 40*   SODIUM mmol/L 136 136   POTASSIUM mmol/L 4.8 4.9   AST (SGOT) U/L  --  17   ALT (SGPT) U/L  --  13   ALK PHOS U/L  --  71   BILIRUBIN mg/dL  --  <0.2   ANION GAP mmol/L 10.5 10.6       CT Abdomen Pelvis Without Contrast    Result Date: 2025  Impression: 1.There is bladder wall thickening/indistinctness, concerning for cystitis. Small amount of air is seen within the bladder lumen which may be related to recent instrumentation or infection with gas-forming organism. Please correlate with urinalysis. 2.There is mild left hydronephrosis without obstructing calculus or mass identified. This could represent ascending infection or recently  passed calculus. 3.Bilateral renal hilar vascular calcifications. Additional nonobstructive nephrolithiasis cannot be entirely excluded. 4.Bilateral lung base opacities may represent edema or infiltrates. Small bilateral pleural effusions are partially imaged. 5.Additional findings as detailed above. Electronically Signed: Enrike Coleman MD  2/21/2025 4:35 PM EST  Workstation ID: [id number]       I reviewed the patient's new clinical results.    Assessment/Plan:     Active and Resolved Problems  Active Hospital Problems    Diagnosis  POA    **Bladder cancer [C67.9]  Yes    Bladder mass [N32.89]  Yes      Resolved Hospital Problems   No resolved problems to display.     Acute hypoxic respiratory failure  Wheeze, question of COPD  Acute heart failure exacerbation  GWEN, hypovolemic?  -Improving with diuresis, continue.  Will give additional 20 mg p.o. prednisone today.  Anticipate ability to wean off oxygen later today or tomorrow morning.  -Trend BMP, feel that he is overall volume overload.  Will correct volume with Lasix and anticipate improvement in the renal function.  Likely consult nephrology in a.m. if expected improvement does not materialize.    Bladder mass  -UA as of 2/10/2025: 21-50 RBCs, 6-10 WBCs, nitrite negative, bacteria negative  -Urine protein creatinine ratio 5, 969  -Postop day 2 of cystoscopy transurethral resection of bladder tumor  -Pain control appropriate.    Constipation  -MiraLAX ordered, suppository as available.     GWEN on CKD  CKD  Trend renal function labs.  See above     CAD  PVD  HLD  -Continue statin, metoprolol.  Hold Plavix and ASA.    -Appreciate urology's recommendations on when to resume AC     DM2  -Accu-Cheks before meals and at bedtime  -SSI while inpatient  -CCD     Tobacco use  -Tobacco cessation  -Nicotine patch       VTE Prophylaxis:  Mechanical VTE prophylaxis orders are present.         Disposition Planning:     Barriers to Discharge: Improvement in pain, bowel  movement  Anticipated Date of Discharge: 2/22/2025  Place of Discharge: Home      Time: 33 minutes     Code Status and Medical Interventions: No CPR (Do Not Attempt to Resuscitate); Limited Support; No intubation (DNI)   Ordered at: 02/20/25 1719     Medical Intervention Limits:    No intubation (DNI)     Level Of Support Discussed With:    Patient     Code Status (Patient has no pulse and is not breathing):    No CPR (Do Not Attempt to Resuscitate)     Medical Interventions (Patient has pulse or is breathing):    Limited Support       Signature: Electronically signed by Juan Manuel Carmen MD, 02/22/25, 12:45 EST.  [facility] Hospitalist Team

## 2025-02-23 ENCOUNTER — APPOINTMENT (OUTPATIENT)
Dept: MRI IMAGING | Facility: HOSPITAL | Age: 85
DRG: 668 | End: 2025-02-23
Payer: MEDICARE

## 2025-02-23 LAB
ALBUMIN UR-MCNC: 43.4 MG/DL
ANION GAP SERPL CALCULATED.3IONS-SCNC: 13.2 MMOL/L (ref 5–15)
BASOPHILS # BLD AUTO: 0.02 10*3/MM3 (ref 0–0.2)
BASOPHILS NFR BLD AUTO: 0.3 % (ref 0–1.5)
BUN SERPL-MCNC: 39 MG/DL (ref 8–23)
BUN/CREAT SERPL: 13.5 (ref 7–25)
CALCIUM SPEC-SCNC: 8.7 MG/DL (ref 8.6–10.5)
CHLORIDE SERPL-SCNC: 102 MMOL/L (ref 98–107)
CO2 SERPL-SCNC: 20.8 MMOL/L (ref 22–29)
CREAT SERPL-MCNC: 2.88 MG/DL (ref 0.76–1.27)
CREAT UR-MCNC: 28.7 MG/DL
CREAT UR-MCNC: 28.8 MG/DL
DEPRECATED RDW RBC AUTO: 41.5 FL (ref 37–54)
EGFRCR SERPLBLD CKD-EPI 2021: 20.6 ML/MIN/1.73
EOSINOPHIL # BLD AUTO: 0 10*3/MM3 (ref 0–0.4)
EOSINOPHIL NFR BLD AUTO: 0 % (ref 0.3–6.2)
ERYTHROCYTE [DISTWIDTH] IN BLOOD BY AUTOMATED COUNT: 12.6 % (ref 12.3–15.4)
GLUCOSE BLDC GLUCOMTR-MCNC: 159 MG/DL (ref 70–105)
GLUCOSE BLDC GLUCOMTR-MCNC: 191 MG/DL (ref 70–105)
GLUCOSE BLDC GLUCOMTR-MCNC: 196 MG/DL (ref 70–105)
GLUCOSE BLDC GLUCOMTR-MCNC: 279 MG/DL (ref 70–105)
GLUCOSE SERPL-MCNC: 168 MG/DL (ref 65–99)
HCT VFR BLD AUTO: 26.7 % (ref 37.5–51)
HGB BLD-MCNC: 8.5 G/DL (ref 13–17.7)
IMM GRANULOCYTES # BLD AUTO: 0.06 10*3/MM3 (ref 0–0.05)
IMM GRANULOCYTES NFR BLD AUTO: 0.8 % (ref 0–0.5)
LYMPHOCYTES # BLD AUTO: 0.71 10*3/MM3 (ref 0.7–3.1)
LYMPHOCYTES NFR BLD AUTO: 9.7 % (ref 19.6–45.3)
MCH RBC QN AUTO: 28.3 PG (ref 26.6–33)
MCHC RBC AUTO-ENTMCNC: 31.8 G/DL (ref 31.5–35.7)
MCV RBC AUTO: 89 FL (ref 79–97)
MICROALBUMIN/CREAT UR: 1506.9 MG/G (ref 0–29)
MONOCYTES # BLD AUTO: 0.84 10*3/MM3 (ref 0.1–0.9)
MONOCYTES NFR BLD AUTO: 11.5 % (ref 5–12)
NEUTROPHILS NFR BLD AUTO: 5.66 10*3/MM3 (ref 1.7–7)
NEUTROPHILS NFR BLD AUTO: 77.7 % (ref 42.7–76)
NRBC BLD AUTO-RTO: 0 /100 WBC (ref 0–0.2)
PLATELET # BLD AUTO: 170 10*3/MM3 (ref 140–450)
PMV BLD AUTO: 10.6 FL (ref 6–12)
POTASSIUM SERPL-SCNC: 4.5 MMOL/L (ref 3.5–5.2)
PROT ?TM UR-MCNC: 70.2 MG/DL
PROT/CREAT UR: 2446 MG/G CREA (ref 0–200)
RBC # BLD AUTO: 3 10*6/MM3 (ref 4.14–5.8)
SODIUM SERPL-SCNC: 136 MMOL/L (ref 136–145)
WBC NRBC COR # BLD AUTO: 7.29 10*3/MM3 (ref 3.4–10.8)
WHOLE BLOOD HOLD COAG: NORMAL

## 2025-02-23 PROCEDURE — 92610 EVALUATE SWALLOWING FUNCTION: CPT

## 2025-02-23 PROCEDURE — 0T2BX0Z CHANGE DRAINAGE DEVICE IN BLADDER, EXTERNAL APPROACH: ICD-10-PCS | Performed by: UROLOGY

## 2025-02-23 PROCEDURE — 63710000001 INSULIN LISPRO (HUMAN) PER 5 UNITS: Performed by: UROLOGY

## 2025-02-23 PROCEDURE — 99233 SBSQ HOSP IP/OBS HIGH 50: CPT | Performed by: STUDENT IN AN ORGANIZED HEALTH CARE EDUCATION/TRAINING PROGRAM

## 2025-02-23 PROCEDURE — 82948 REAGENT STRIP/BLOOD GLUCOSE: CPT | Performed by: UROLOGY

## 2025-02-23 PROCEDURE — 25010000002 DIAZEPAM PER 5 MG: Performed by: INTERNAL MEDICINE

## 2025-02-23 PROCEDURE — 80048 BASIC METABOLIC PNL TOTAL CA: CPT | Performed by: UROLOGY

## 2025-02-23 PROCEDURE — 85025 COMPLETE CBC W/AUTO DIFF WBC: CPT | Performed by: UROLOGY

## 2025-02-23 PROCEDURE — 97166 OT EVAL MOD COMPLEX 45 MIN: CPT | Performed by: OCCUPATIONAL THERAPIST

## 2025-02-23 PROCEDURE — 99233 SBSQ HOSP IP/OBS HIGH 50: CPT | Performed by: INTERNAL MEDICINE

## 2025-02-23 PROCEDURE — 82948 REAGENT STRIP/BLOOD GLUCOSE: CPT

## 2025-02-23 PROCEDURE — 97162 PT EVAL MOD COMPLEX 30 MIN: CPT

## 2025-02-23 RX ORDER — CLOPIDOGREL BISULFATE 75 MG/1
75 TABLET ORAL DAILY
Status: DISCONTINUED | OUTPATIENT
Start: 2025-02-24 | End: 2025-02-24 | Stop reason: HOSPADM

## 2025-02-23 RX ORDER — HYDRALAZINE HYDROCHLORIDE 20 MG/ML
10 INJECTION INTRAMUSCULAR; INTRAVENOUS EVERY 6 HOURS PRN
Status: DISCONTINUED | OUTPATIENT
Start: 2025-02-23 | End: 2025-02-23

## 2025-02-23 RX ORDER — TAMSULOSIN HYDROCHLORIDE 0.4 MG/1
0.4 CAPSULE ORAL DAILY
Status: DISCONTINUED | OUTPATIENT
Start: 2025-02-23 | End: 2025-02-24 | Stop reason: HOSPADM

## 2025-02-23 RX ORDER — DIAZEPAM 10 MG/2ML
2.5 INJECTION, SOLUTION INTRAMUSCULAR; INTRAVENOUS ONCE
Status: COMPLETED | OUTPATIENT
Start: 2025-02-23 | End: 2025-02-23

## 2025-02-23 RX ORDER — CARVEDILOL 6.25 MG/1
12.5 TABLET ORAL 2 TIMES DAILY WITH MEALS
Status: DISCONTINUED | OUTPATIENT
Start: 2025-02-23 | End: 2025-02-24 | Stop reason: HOSPADM

## 2025-02-23 RX ADMIN — AMLODIPINE BESYLATE 5 MG: 5 TABLET ORAL at 08:25

## 2025-02-23 RX ADMIN — CETIRIZINE HYDROCHLORIDE 10 MG: 10 TABLET, FILM COATED ORAL at 08:25

## 2025-02-23 RX ADMIN — ATORVASTATIN CALCIUM 40 MG: 40 TABLET, FILM COATED ORAL at 20:51

## 2025-02-23 RX ADMIN — INSULIN LISPRO 6 UNITS: 100 INJECTION, SOLUTION INTRAVENOUS; SUBCUTANEOUS at 21:02

## 2025-02-23 RX ADMIN — HYDRALAZINE HYDROCHLORIDE 10 MG: 10 TABLET ORAL at 08:25

## 2025-02-23 RX ADMIN — PANTOPRAZOLE SODIUM 40 MG: 40 TABLET, DELAYED RELEASE ORAL at 08:26

## 2025-02-23 RX ADMIN — INSULIN LISPRO 2 UNITS: 100 INJECTION, SOLUTION INTRAVENOUS; SUBCUTANEOUS at 17:31

## 2025-02-23 RX ADMIN — INSULIN LISPRO 2 UNITS: 100 INJECTION, SOLUTION INTRAVENOUS; SUBCUTANEOUS at 13:02

## 2025-02-23 RX ADMIN — HYDROCODONE BITARTRATE AND ACETAMINOPHEN 1 TABLET: 10; 325 TABLET ORAL at 14:12

## 2025-02-23 RX ADMIN — CARVEDILOL 12.5 MG: 6.25 TABLET, FILM COATED ORAL at 17:31

## 2025-02-23 RX ADMIN — POLYETHYLENE GLYCOL 3350 34 G: 17 POWDER, FOR SOLUTION ORAL at 08:26

## 2025-02-23 RX ADMIN — DIAZEPAM 2.5 MG: 10 INJECTION, SOLUTION INTRAMUSCULAR; INTRAVENOUS at 09:09

## 2025-02-23 NOTE — THERAPY EVALUATION
Acute Care - Speech Language Pathology   Swallow Initial Evaluation  [facility]     Patient Name: Dave Peguero  : 1940  MRN: [id number]  Today's Date: 2025               Admit Date: 2025    Visit Dx:     ICD-10-CM ICD-9-CM   1. Bladder tumor  D49.4 239.4   2. 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     Atherosclerosis of native arteries of extremities with intermittent claudication, left leg     Cancer     breast cancer hx    Carotid artery stenosis     Cerebral vascular disease     CHF (congestive heart failure)     Coronary artery disease     Diabetes mellitus     type 1    Hyperlipidemia     Hypertension     Kidney stone     Myocardial infarction     x 3     Past Surgical History:   Procedure Laterality Date    CARDIAC CATHETERIZATION      CORONARY ARTERY BYPASS GRAFT      TRIPLE    DENTAL PROCEDURE      all teeth have been pulled    FEMORAL ARTERY STENT Bilateral     JOINT REPLACEMENT Right     knee    PROSTATE SURGERY      TRANSURETHRAL RESECTION OF BLADDER TUMOR N/A 2025    Procedure: CYSTOSCOPY TRANSURETHRAL RESECTION OF BLADDER TUMOR;  Surgeon: Kevin Rodriguez MD;  Location: [facility];  Service: Urology;  Laterality: N/A;       SLP Recommendation and Plan  SLP Swallowing Diagnosis: functional oral phase, functional pharyngeal phase (25 141)  SLP Diet Recommendation: mechanical ground textures, thin liquids (25 141)  Recommended Precautions and Strategies: upright posture during/after eating, small bites of food and sips of liquid, multiple swallows per bite of food, alternate between small bites of food and sips of liquid,  general aspiration precautions, reflux precautions (02/23/25 1412)  SLP Rec. for Method of Medication Administration: meds whole, meds crushed, as tolerated (02/23/25 1412)     Monitor for Signs of Aspiration: yes, notify SLP if any concerns (02/23/25 1412)     Swallow Criteria for Skilled Therapeutic Interventions Met: demonstrates skilled criteria (02/23/25 1412)     Rehab Potential/Prognosis, Swallowing: good, to achieve stated therapy goals (02/23/25 1412)  Therapy Frequency (Swallow): PRN (02/23/25 1412)  Predicted Duration Therapy Intervention (Days): until discharge (02/23/25 1412)  Oral Care Recommendations: Oral Care BID/PRN, Swab (02/23/25 1412)     SWALLOW EVALUATION (Last 72 Hours)       SLP Adult Swallow Evaluation       Row Name 02/23/25 1412       Rehab Evaluation    Document Type evaluation  -CB    Subjective Information no complaints  -CB    Patient Observations alert;cooperative  -CB    Patient/Family/Caregiver Comments/Observations Patient was able to follow simple directives.  -CB    Patient Effort good  -CB       General Information    Patient Profile Reviewed yes  -CB    Pertinent History Of Current Problem Dave Peguero is a 84 y.o. male who is former smoker with a past medical history of bilateral internal carotid artery stenoses 40-50% per MRA 2019, chronic left vertebral artery occlusion, chronic left A1 stenoses, CKD 3 (creatinine 3.09), bladder tumor s/p resection 2/20/2025, CAD s/p CABG (2018), MI (2018), DMT2 (hemoglobin A1c 7.6%), hypertension, hyperlipidemia, neuropathy who presented to [facility] on [facility]/20/2025 for scheduled bladder resection who has remained inpatient due to complications of urinary retention. TeleNeurology called at 7:49 PM. CT head negative for hemorrhage. Per daughter, the patient was last in his normal health on 2/22/2025 at 6:30 PM. At baseline, the patient lives independently and performs all of his activities of daily living independently. Baseline  mRS 0. Patient was not a candidate for IV thrombolytics given recent severe bleeding secondary to bladder cancer as well as recent bladder resection 2/20/2025. The patient normally takes clopidogrel for coronary artery disease, however this had not yet been resumed following surgery.  Teleneurology NIH score 2 (facial palsy, 1 and dysarthria, 1).      CTA showed significant atherosclerotic changes intracranially and extracranially.  Severe densely calcified plaques in the bilateral right carotid bifurcations measuring greater than 70%.  Chronic occlusion of the nondominant left vertebral artery.  High-grade focal stenosis of the origin of the right subclavian artery.  High-grade stenosis of the origin right common carotid artery greater than 70%.  High-grade stenosis of the origin of the left A1 segment.  Moderate grade stenosis of the mid right V4 segment  -CB    Current Method of Nutrition NPO  -CB       Pain    Pretreatment Pain Rating 0/10 - no pain  -CB    Posttreatment Pain Rating 0/10 - no pain  -CB       Oral Motor Structure and Function    Dentition Assessment upper dentures/partial in place;lower dentures/partial in place  -CB    Secretion Management WNL/WFL  -CB    Mucosal Quality moist, healthy  -CB    Gag Response absent or diminished  -CB       Oral Musculature and Cranial Nerve Assessment    Oral Motor General Assessment WFL  -CB    Oral Motor, Comment Oral mechanism examination revealed patient demonstrated full ROM and mobility of lingual and labial structures.  -CB       General Eating/Swallowing Observations    Respiratory Support Currently in Use nasal cannula  -CB    O2 Liters 3L  -CB    Eating/Swallowing Skills self-fed  -CB    Positioning During Eating upright in chair  -CB    Utensils Used spoon;cup;straw  -CB    Consistencies Trialed mixed consistency;pureed;thin liquids  -CB       Clinical Swallow Eval    Clinical Swallow Evaluation Summary Patient was seen for dysphagia evaluation per MD  order. Most recent chest x-ray revealed patchy airspace disease visualized upper lungs with small bilateral pleural effusion. This could reflect pulmonary edema or pneumonia. According to patient, baseline diet is regular. Patient reports no history of CVA, pneumonia cervical neck surgery. Patient does have history of GERD. Patient has upper and lower dentures. Oral mechanism examination revealed patient demonstrated full ROM and mobility of lingual and labial structures. Noted lingual deviation to the right upon protrusion. Noted mild left facial droop. Lingual and jaw strength was adequate. Palatal movement was present. Noted no gag reflex.  Patient was sitting upright in recliner at 90 degree hip flexion prior to any PO trials. Provided trials of thins via spoon x 3, cup x 3 and straw x 3 each. No overt s/s of aspiration was observed. No wet vocal quality was detected. Given digital palpation, swallow was timely. Provided trials of puree x 3. No clearing of throat, cough and/or vocal changes were identified. Oral transit was timely as he initiated swallow response within 3 seconds in duration. Patient cleared oral cavity effectively between bites without evidence of oral residue. Provided trials of mixed consistency x 3. Patient displayed adequate rotary chewing. No oral residue was evident following the swallow. Patient displayed adequate rotary chewing. Noted patient with running nose following small bite of STC x 1. Assessment was interrupted by nursing because she had to transfer patient over to the bed before someone came in to place a catheter. Therefore, no other trials were attempted. Patient demonstrated functional oral and pharyngeal swallow of the consistencies attempted. Therefore, it is recommended that patient receive a mechanical soft diet at this time. ST will follow to assure safety and adequacy of diet and independence with safe swallow strategies. Furthermore, ongoing assessment of upgrade will  need to be pursued since assessment was interrupted and could not be completed.  -CB       SLP Evaluation Clinical Impression    SLP Swallowing Diagnosis functional oral phase;functional pharyngeal phase  -CB    Functional Impact risk of aspiration/pneumonia  exhibited running nose during assessment.  -CB    Rehab Potential/Prognosis, Swallowing good, to achieve stated therapy goals  -CB    Swallow Criteria for Skilled Therapeutic Interventions Met demonstrates skilled criteria  -CB       Recommendations    Therapy Frequency (Swallow) PRN  -CB    Predicted Duration Therapy Intervention (Days) until discharge  -CB    SLP Diet Recommendation mechanical ground textures;thin liquids  -CB    Recommended Precautions and Strategies upright posture during/after eating;small bites of food and sips of liquid;multiple swallows per bite of food;alternate between small bites of food and sips of liquid;general aspiration precautions;reflux precautions  -CB    Oral Care Recommendations Oral Care BID/PRN;Swab  -CB    SLP Rec. for Method of Medication Administration meds whole;meds crushed;as tolerated  -CB    Monitor for Signs of Aspiration yes;notify SLP if any concerns  -CB       Swallow Goals (SLP)    Swallow LTGs Swallow Long Term Goal (free text)  -CB    Swallow STGs diet tolerance goal selection (SLP)  -CB    Diet Tolerance Goal Selection (SLP) Swallow Short Term Goal 1  -CB       (LTG) Swallow    (LTG) Swallow Patient will tolerate safest and least restrictive diet without complications from aspiration.  -CB    Time Frame (Swallow Long Term Goal) by discharge  -CB    Progress/Outcomes (Swallow Long Term Goal) new goal  -CB       (STG) Swallow 1    (STG) Swallow 1 Patient will participate in meal assessment to assure safety and adequacy of diet and/or advancement of diet and independent use of safe swallow strategies.  -CB    Time Frame (Swallow Short Term Goal 1) 1 week  -CB    Progress/Outcomes (Swallow Short Term Goal 1)  new goal  -CB              User Key  (r) = Recorded By, (t) = Taken By, (c) = Cosigned By      Initials Name Effective Dates    Mercedes Schofield SLP 09/21/21 -                     EDUCATION  The patient has been educated in the following areas:   Dysphagia (Swallowing Impairment) Oral Care/Hydration.        SLP GOALS       Row Name 02/23/25 1412       (LTG) Swallow    (LTG) Swallow Patient will tolerate safest and least restrictive diet without complications from aspiration.  -CB    Time Frame (Swallow Long Term Goal) by discharge  -CB    Progress/Outcomes (Swallow Long Term Goal) new goal  -CB       (STG) Swallow 1    (STG) Swallow 1 Patient will participate in meal assessment to assure safety and adequacy of diet and/or advancement of diet and independent use of safe swallow strategies.  -CB    Time Frame (Swallow Short Term Goal 1) 1 week  -CB    Progress/Outcomes (Swallow Short Term Goal 1) new goal  -CB              User Key  (r) = Recorded By, (t) = Taken By, (c) = Cosigned By      Initials Name Provider Type    Mercedes Schofield, SLP Speech and Language Pathologist                         Time Calculation:                SAGE Eaton  2/23/2025

## 2025-02-23 NOTE — NURSING NOTE
Patient not a candidate for TNK due to recent surgery per Dr. Capellan.  Will place neuro consult for morning follow up.  Also, will contact nephrology as IV contrast was given for scans, patient will need fluids.  Will transfer to [facility] level for care.

## 2025-02-23 NOTE — PROGRESS NOTES
POD 3 TURBT, large  GWEN    Events noted   Possible TIA   Started on ASA    Imaging c/w high grade cartoid stenosis    Cr 3.1 yesterday  Hgb 9.1 today  Labs pending    Afebrile 151/78 88 19  A+O x 4  CnII-XII WNL with exception of very minimal left facial droop    A/P  Bladder mass  S/p TURBT  Voiding well, check PVR    GWEN - mulitfactorial, and may worsen after IV cont  Very minimal hydro on CT - doubt significant  Likely related to diuresis   Renal following    CHF - per medicine  Cards consult pending    Appreciate neuro input  Cont ASA  Wait one more day on plavix    AddendumL  PVR > 400  Nolen replaced  Cr 2.9 - hopefully will continue to trend down  Start flomax

## 2025-02-23 NOTE — PROGRESS NOTES
"                                                                                                            [facility]/ [facility]                                                                     Nephrology Progress Note                                                                                LOS: 1 day     Chief Complaint/ Reason for encounter: GWEN    Subjective   02/23/25 :   Pt denies for any new complaint. No nausea or vomiting.       Medical history reviewed:  History of Present Illness    Subjective    History taken from: Patient and chart    Vital Signs  Temp:  [97.3 °F (36.3 °C)-98.6 °F (37 °C)] 97.3 °F (36.3 °C)  Heart Rate:  [] 91  Resp:  [16-22] 17  BP: (110-166)/(62-91) 142/62       Wt Readings from Last 1 Encounters:   02/22/25 1958 91.1 kg (200 lb 13.4 oz)   02/20/25 1000 91.2 kg (201 lb)   02/14/25 1449 90.7 kg (200 lb)       Objective:  Vital signs: (most recent): Blood pressure 157/68, pulse 80, temperature 98.1 °F (36.7 °C), temperature source Oral, resp. rate 16, height 175.3 cm (69\"), weight 91.1 kg (200 lb 13.4 oz), SpO2 93%.                Objective:  General Appearance:  Comfortable, -appearing, in no acute distress and not in pain.  Awake, alert  HEENT: Mucous membranes moist, no injury, oropharynx clear  Lungs:  Normal effort and normal respiratory rate.  Breath sounds clear to auscultation.  No  respiratory distress.  No rales, decreased breath sounds or rhonchi.    Heart: Normal rate.  Regular rhythm.  S1, S2 normal.  No murmur.   Abdomen: Abdomen is soft.  Bowel sounds are normal, no abdominal tenderness.  There is no rebound or guarding  Extremities:  no edema of bilateral lower extremities  Skin:  Warm and dry with no rashes      Results Review:    Intake/Output:     Intake/Output Summary (Last 24 hours) at 2/23/2025 0820  Last data filed at 2/23/2025 0100  Gross per 24 hour   Intake 600 ml   Output 950 ml   Net -350 ml         DATA:  Radiology and Labs:  The " following labs independently reviewed by me. Additional labs ordered for tomorrow a.m.  Interval notes, chart personally reviewed by me.   Old records independently reviewed showing   The following radiologic studies independently viewed by me, findings   New problems include  Discussed with     Risk/ complexity of medical care/ medical decision making moderate.    Labs:   Recent Results (from the past 24 hours)   Basic Metabolic Panel    Collection Time: 02/22/25 10:37 AM    Specimen: Blood   Result Value Ref Range    Glucose 171 (H) 65 - 99 mg/dL    BUN 40 (H) 8 - 23 mg/dL    Creatinine 3.09 (H) 0.76 - 1.27 mg/dL    Sodium 136 136 - 145 mmol/L    Potassium 4.8 3.5 - 5.2 mmol/L    Chloride 104 98 - 107 mmol/L    CO2 21.5 (L) 22.0 - 29.0 mmol/L    Calcium 8.8 8.6 - 10.5 mg/dL    BUN/Creatinine Ratio 12.9 7.0 - 25.0    Anion Gap 10.5 5.0 - 15.0 mmol/L    eGFR 18.9 (L) >60.0 mL/min/1.73   CBC Auto Differential    Collection Time: 02/22/25 10:37 AM    Specimen: Blood   Result Value Ref Range    WBC 9.89 3.40 - 10.80 10*3/mm3    RBC 3.17 (L) 4.14 - 5.80 10*6/mm3    Hemoglobin 9.1 (L) 13.0 - 17.7 g/dL    Hematocrit 28.2 (L) 37.5 - 51.0 %    MCV 89.0 79.0 - 97.0 fL    MCH 28.7 26.6 - 33.0 pg    MCHC 32.3 31.5 - 35.7 g/dL    RDW 12.8 12.3 - 15.4 %    RDW-SD 41.6 37.0 - 54.0 fl    MPV 10.3 6.0 - 12.0 fL    Platelets 181 140 - 450 10*3/mm3    Neutrophil % 77.5 (H) 42.7 - 76.0 %    Lymphocyte % 8.5 (L) 19.6 - 45.3 %    Monocyte % 12.8 (H) 5.0 - 12.0 %    Eosinophil % 0.4 0.3 - 6.2 %    Basophil % 0.4 0.0 - 1.5 %    Immature Grans % 0.4 0.0 - 0.5 %    Neutrophils, Absolute 7.66 (H) 1.70 - 7.00 10*3/mm3    Lymphocytes, Absolute 0.84 0.70 - 3.10 10*3/mm3    Monocytes, Absolute 1.27 (H) 0.10 - 0.90 10*3/mm3    Eosinophils, Absolute 0.04 0.00 - 0.40 10*3/mm3    Basophils, Absolute 0.04 0.00 - 0.20 10*3/mm3    Immature Grans, Absolute 0.04 0.00 - 0.05 10*3/mm3    nRBC 0.0 0.0 - 0.2 /100 WBC   POC Glucose 4x Daily Before Meals & at  Bedtime    Collection Time: 02/22/25 11:57 AM    Specimen: Blood   Result Value Ref Range    Glucose 176 (H) 70 - 105 mg/dL   POC Glucose Once    Collection Time: 02/22/25  4:52 PM    Specimen: Blood   Result Value Ref Range    Glucose 233 (H) 70 - 105 mg/dL   POC Glucose Once    Collection Time: 02/22/25  7:31 PM    Specimen: Blood   Result Value Ref Range    Glucose 298 (H) 70 - 105 mg/dL   POC Glucose 4x Daily Before Meals & at Bedtime    Collection Time: 02/22/25 10:34 PM    Specimen: Blood   Result Value Ref Range    Glucose 279 (H) 70 - 105 mg/dL   Basic Metabolic Panel    Collection Time: 02/23/25  4:29 AM    Specimen: Arm, Right; Blood   Result Value Ref Range    Glucose 168 (H) 65 - 99 mg/dL    BUN 39 (H) 8 - 23 mg/dL    Creatinine 2.88 (H) 0.76 - 1.27 mg/dL    Sodium 136 136 - 145 mmol/L    Potassium 4.5 3.5 - 5.2 mmol/L    Chloride 102 98 - 107 mmol/L    CO2 20.8 (L) 22.0 - 29.0 mmol/L    Calcium 8.7 8.6 - 10.5 mg/dL    BUN/Creatinine Ratio 13.5 7.0 - 25.0    Anion Gap 13.2 5.0 - 15.0 mmol/L    eGFR 20.6 (L) >60.0 mL/min/1.73   CBC Auto Differential    Collection Time: 02/23/25  4:29 AM    Specimen: Arm, Right; Blood   Result Value Ref Range    WBC 7.29 3.40 - 10.80 10*3/mm3    RBC 3.00 (L) 4.14 - 5.80 10*6/mm3    Hemoglobin 8.5 (L) 13.0 - 17.7 g/dL    Hematocrit 26.7 (L) 37.5 - 51.0 %    MCV 89.0 79.0 - 97.0 fL    MCH 28.3 26.6 - 33.0 pg    MCHC 31.8 31.5 - 35.7 g/dL    RDW 12.6 12.3 - 15.4 %    RDW-SD 41.5 37.0 - 54.0 fl    MPV 10.6 6.0 - 12.0 fL    Platelets 170 140 - 450 10*3/mm3    Neutrophil % 77.7 (H) 42.7 - 76.0 %    Lymphocyte % 9.7 (L) 19.6 - 45.3 %    Monocyte % 11.5 5.0 - 12.0 %    Eosinophil % 0.0 (L) 0.3 - 6.2 %    Basophil % 0.3 0.0 - 1.5 %    Immature Grans % 0.8 (H) 0.0 - 0.5 %    Neutrophils, Absolute 5.66 1.70 - 7.00 10*3/mm3    Lymphocytes, Absolute 0.71 0.70 - 3.10 10*3/mm3    Monocytes, Absolute 0.84 0.10 - 0.90 10*3/mm3    Eosinophils, Absolute 0.00 0.00 - 0.40 10*3/mm3     Basophils, Absolute 0.02 0.00 - 0.20 10*3/mm3    Immature Grans, Absolute 0.06 (H) 0.00 - 0.05 10*3/mm3    nRBC 0.0 0.0 - 0.2 /100 WBC   Light Blue Top    Collection Time: 02/23/25  4:29 AM   Result Value Ref Range    Extra Tube Hold for add-ons.    POC Glucose 4x Daily Before Meals & at Bedtime    Collection Time: 02/23/25  8:00 AM    Specimen: Blood   Result Value Ref Range    Glucose 159 (H) 70 - 105 mg/dL       Radiology:  Pertinent radiology studies were reviewed as described above      Medications have been reviewed separately in chart overview      ASSESSMENT / PLAN    Acute kidney injury likely from diuretic therapy in together with hemodynamic changes on chronic kidney disease stage III/1V proteinuria of 1.5 g likely secondary to DM  Patient with history of diabetes mellitus     Plan  albumin /  creatinine ratio 1.5 gram   Avoid NSAID  May have to back off on the diuretic therapy if noted rising further creatinine  Dose medication for GFR less than 15  Patient will benefit from RAAS blockade and SGLT 2 inhibitor if GFR improves above 25.  Will wait on acute kidney injury to improve before Introducing above regime.      Hypoxia  COPD   diastolic heart failure  Ca Bladder status post resection  CAD  Diabetes mellitus    Laura Fairchild MD  [facility]  Office phone number: [phone]  Answering service phone number: [phone]    02/23/25  08:20 EST    Dictation performed using Dragon dictation software

## 2025-02-23 NOTE — CONSULTS
Cardiology consult note  Ryan Milton MD, PhD    Patient Care Team:  Fidel Le MD as PCP - General (Internal Medicine)  Kristin Krause MD as PCP - Family Medicine      CONSULTATION: From hospitalist      CHIEF COMPLAINT: Shortness of breath and CHF      HPI  Patient well-known to me from prior encounters, history of bladder cancer, previously admitted with uncontrolled hypertensive urgency, had inpatient hospitalization with reduction of his blood pressure underwent stress testing which was low risk for significant burden of reversible ischemia and he was cleared for bladder surgery for tumor removal, has preserved EF with diastolic dysfunction.  He has underlying CKD baseline creatinine was around 1.5-2, has uptrending creatinine and GWNE with volume overload with creatinine of nearly 3, his diuretics have been held and he appears volume overloaded with variable blood pressures and hypertension 1 50-1 70 systolic.  Chest pain-free, denies any hematuria.    Labs reviewed and interpreted by me demonstrated creatinine of 3, CO2 is around 20, certainly not with contraction alkalosis, does not appear dry on exam, sodium is 136, hemoglobin around 9    Reportedly patient had some bradycardia around the time of his surgery  Could not rule out bradycardia or hypotension contributing to GWEN perioperatively    review of systems otherwise negative x 14 point review of systems except as mentioned above  Historical data copied forward from previous encounters in EMR is unchanged          REVIEW OF SYSTEMS: Some 14-point review of systems is negative except what is mentioned in the HPI relative to the current complaint.     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         PAST SURGICAL HISTORY:   Past Surgical History:   Procedure Laterality Date    CARDIAC CATHETERIZATION      CORONARY ARTERY BYPASS GRAFT      TRIPLE    DENTAL PROCEDURE      all teeth have been pulled    FEMORAL ARTERY STENT Bilateral     JOINT REPLACEMENT Right     knee    PROSTATE SURGERY      TRANSURETHRAL RESECTION OF BLADDER TUMOR N/A 2/20/2025    Procedure: CYSTOSCOPY TRANSURETHRAL RESECTION OF BLADDER TUMOR;  Surgeon: Kevin Rodriguez MD;  Location: [facility] MAIN OR;  Service: Urology;  Laterality: N/A;          FAMILY HISTORY:   Family History   Problem Relation Age of Onset    COPD Mother     Heart disease Mother     Stroke Mother     Hypertension Mother     Cancer Father          SOCIAL HISTORY:   Social History     Socioeconomic History    Marital status:    Tobacco Use    Smoking status: Former     Current packs/day: 0.50     Types: Cigarettes    Smokeless tobacco: Never   Vaping Use    Vaping status: Never Used   Substance and Sexual Activity    Alcohol use: No    Drug use: No    Sexual activity: Defer       CURRENT MEDICATIONS:     Current Facility-Administered Medications:     acetaminophen (TYLENOL) tablet 650 mg, 650 mg, Oral, Q4H PRN, 650 mg at 02/22/25 1735 **OR** acetaminophen (TYLENOL) suppository 650 mg, 650 mg, Rectal, Q4H PRN, Kevin Rodriguez MD    amLODIPine (NORVASC) tablet 5 mg, 5 mg, Oral, BID, Kevin Rodriguez MD, 5 mg at 02/22/25 0826    atorvastatin (LIPITOR) tablet 40 mg, 40 mg, Oral, Nightly, Daisy Capellan MD     carvedilol (COREG) tablet 25 mg, 25 mg, Oral, BID With Meals, Kevin Rodriguez MD, 25 mg at 02/22/25 1725    cetirizine (zyrTEC) tablet 10 mg, 10 mg, Oral, Daily, Kevin Rodriguez MD, 10 mg at 02/22/25 0826    dextrose (D50W) (25 g/50 mL) IV injection 25 g, 25 g, Intravenous, Q15 Min PRN, Naseem Kwok MD    dextrose (GLUTOSE) oral gel 15 g, 15 g, Oral, Q15 Min PRN, Naseem Kwok MD    glucagon (GLUCAGEN) injection 1 mg, 1 mg, Intramuscular, Q15 Min PRN, Naseem Kwok MD    hydrALAZINE (APRESOLINE) tablet 10 mg, 10 mg, Oral, TID, Kevin Rodriguez MD, 10 mg at 02/22/25 1726    HYDROcodone-acetaminophen (NORCO)  MG per tablet 1 tablet, 1 tablet, Oral, Q4H PRN, Juan Manuel Carmen MD, 1 tablet at 02/22/25 1056    insulin lispro (HUMALOG/ADMELOG) injection 2-9 Units, 2-9 Units, Subcutaneous, 4x Daily AC & at Bedtime, Kevin Rodriguez MD, 6 Units at 02/22/25 2240    ipratropium-albuterol (DUO-NEB) nebulizer solution 3 mL, 3 mL, Nebulization, Q4H PRN, Salome Villalta APRN, 3 mL at 02/22/25 1452    isosorbide mononitrate (IMDUR) 24 hr tablet 30 mg, 30 mg, Oral, Q24H, Kevin Rodriguez MD, 30 mg at 02/22/25 0826    minoxidil (LONITEN) tablet 2.5 mg, 2.5 mg, Oral, Q24H, Kevin Rodriguez MD, 2.5 mg at 02/22/25 0826    nicotine (NICODERM CQ) 14 MG/24HR patch 1 patch, 1 patch, Transdermal, Q24H, Juan Manuel Carmen MD, 1 patch at 02/22/25 1234    ondansetron ODT (ZOFRAN-ODT) disintegrating tablet 4 mg, 4 mg, Oral, Q6H PRN, 4 mg at 02/21/25 1214 **OR** ondansetron (ZOFRAN) injection 4 mg, 4 mg, Intravenous, Q6H PRN, Kevin Rodriguez MD    pantoprazole (PROTONIX) EC tablet 40 mg, 40 mg, Oral, Daily, Kevin Rodriguez MD, 40 mg at 02/22/25 0826    polyethylene glycol (MIRALAX) packet 34 g, 34 g, Oral, Daily, Juan Manuel Carmen MD, 34 g at 02/22/25 1107    predniSONE (DELTASONE) tablet 20 mg, 20 mg, Oral, Daily, Juan Manuel Carmen MD, 20 mg at 02/22/25 1411    sucralfate (CARAFATE) tablet 1 g, 1 g, Oral, Every Other  Day, Kevin Rodriguez MD, 1 g at 02/22/25 0826    tamsulosin (FLOMAX) 24 hr capsule 0.4 mg, 0.4 mg, Oral, Daily, Ryan Sequeira MD      DIAGNOSTIC DATA:     I reviewed the patient's new clinical results.    Lab Results (last 24 hours)       Procedure Component Value Units Date/Time    POC Glucose 4x Daily Before Meals & at Bedtime [[id number]]  (Abnormal) Collected: 02/23/25 0800    Specimen: Blood Updated: 02/23/25 0803     Glucose 159 mg/dL      Comment: Serial Number: [id number]:  [id number]       Basic Metabolic Panel [[id number]]  (Abnormal) Collected: 02/23/25 0429    Specimen: Blood from Arm, Right Updated: 02/23/25 0533     Glucose 168 mg/dL      BUN 39 mg/dL      Creatinine 2.88 mg/dL      Sodium 136 mmol/L      Potassium 4.5 mmol/L      Chloride 102 mmol/L      CO2 20.8 mmol/L      Calcium 8.7 mg/dL      BUN/Creatinine Ratio 13.5     Anion Gap 13.2 mmol/L      eGFR 20.6 mL/min/1.73     Narrative:      GFR Categories in Chronic Kidney Disease (CKD)      GFR Category          GFR (mL/min/1.73)    Interpretation  G1                     90 or greater         Normal or high (1)  G2                      60-89                Mild decrease (1)  G3a                   45-59                Mild to moderate decrease  G3b                   30-44                Moderate to severe decrease  G4                    15-29                Severe decrease  G5                    14 or less           Kidney failure          (1)In the absence of evidence of kidney disease, neither GFR category G1 or G2 fulfill the criteria for CKD.    eGFR calculation 2021 CKD-EPI creatinine equation, which does not include race as a factor    Extra Tubes [[id number]] Collected: 02/23/25 0429    Specimen: Blood from Arm, Right Updated: 02/23/25 0515    Narrative:      The following orders were created for panel order Extra Tubes.  Procedure                               Abnormality         Status                     ---------                                -----------         ------                     Light Blue Top[[id number]]                                   Final result                 Please view results for these tests on the individual orders.    Light Blue Top [[id number]] Collected: 02/23/25 0429    Specimen: Blood from Arm, Right Updated: 02/23/25 0515     Extra Tube Hold for add-ons.     Comment: Auto resulted       CBC & Differential [[id number]]  (Abnormal) Collected: 02/23/25 0429    Specimen: Blood from Arm, Right Updated: 02/23/25 0513    Narrative:      The following orders were created for panel order CBC & Differential.  Procedure                               Abnormality         Status                     ---------                               -----------         ------                     CBC Auto Differential[[id number]]        Abnormal            Final result                 Please view results for these tests on the individual orders.    CBC Auto Differential [[id number]]  (Abnormal) Collected: 02/23/25 0429    Specimen: Blood from Arm, Right Updated: 02/23/25 0513     WBC 7.29 10*3/mm3      RBC 3.00 10*6/mm3      Hemoglobin 8.5 g/dL      Hematocrit 26.7 %      MCV 89.0 fL      MCH 28.3 pg      MCHC 31.8 g/dL      RDW 12.6 %      RDW-SD 41.5 fl      MPV 10.6 fL      Platelets 170 10*3/mm3      Neutrophil % 77.7 %      Lymphocyte % 9.7 %      Monocyte % 11.5 %      Eosinophil % 0.0 %      Basophil % 0.3 %      Immature Grans % 0.8 %      Neutrophils, Absolute 5.66 10*3/mm3      Lymphocytes, Absolute 0.71 10*3/mm3      Monocytes, Absolute 0.84 10*3/mm3      Eosinophils, Absolute 0.00 10*3/mm3      Basophils, Absolute 0.02 10*3/mm3      Immature Grans, Absolute 0.06 10*3/mm3      nRBC 0.0 /100 WBC     Microalbumin / Creatinine Urine Ratio - Urine, Clean Catch [[id number]] Collected: 02/22/25 2242    Specimen: Urine, Clean Catch Updated: 02/22/25 2246    Protein / Creatinine Ratio, Urine - Urine, Clean Catch [[id number]] Collected:  02/22/25 2242    Specimen: Urine, Clean Catch Updated: 02/22/25 2246    POC Glucose 4x Daily Before Meals & at Bedtime [[id number]]  (Abnormal) Collected: 02/22/25 2234    Specimen: Blood Updated: 02/22/25 2236     Glucose 279 mg/dL      Comment: Serial Number: [id number]:  [id number]       POC Glucose Once [[id number]]  (Abnormal) Collected: 02/22/25 1931    Specimen: Blood Updated: 02/22/25 1932     Glucose 298 mg/dL      Comment: Serial Number: [id number]:  [id number]       POC Glucose Once [[id number]]  (Abnormal) Collected: 02/22/25 1652    Specimen: Blood Updated: 02/22/25 1656     Glucose 233 mg/dL      Comment: Serial Number: [id number]:  [id number]       POC Glucose 4x Daily Before Meals & at Bedtime [[id number]]  (Abnormal) Collected: 02/22/25 1157    Specimen: Blood Updated: 02/22/25 1159     Glucose 176 mg/dL      Comment: Serial Number: [id number]:  [id number]       Basic Metabolic Panel [[id number]]  (Abnormal) Collected: 02/22/25 1037    Specimen: Blood Updated: 02/22/25 1111     Glucose 171 mg/dL      BUN 40 mg/dL      Creatinine 3.09 mg/dL      Sodium 136 mmol/L      Potassium 4.8 mmol/L      Chloride 104 mmol/L      CO2 21.5 mmol/L      Calcium 8.8 mg/dL      BUN/Creatinine Ratio 12.9     Anion Gap 10.5 mmol/L      eGFR 18.9 mL/min/1.73     Narrative:      GFR Categories in Chronic Kidney Disease (CKD)      GFR Category          GFR (mL/min/1.73)    Interpretation  G1                     90 or greater         Normal or high (1)  G2                      60-89                Mild decrease (1)  G3a                   45-59                Mild to moderate decrease  G3b                   30-44                Moderate to severe decrease  G4                    15-29                Severe decrease  G5                    14 or less           Kidney failure          (1)In the absence of evidence of kidney disease, neither GFR category G1 or G2 fulfill the criteria for CKD.    eGFR  calculation 2021 CKD-EPI creatinine equation, which does not include race as a factor    CBC & Differential [[id number]]  (Abnormal) Collected: 02/22/25 1037    Specimen: Blood Updated: 02/22/25 1052    Narrative:      The following orders were created for panel order CBC & Differential.  Procedure                               Abnormality         Status                     ---------                               -----------         ------                     CBC Auto Differential[[id number]]        Abnormal            Final result                 Please view results for these tests on the individual orders.    CBC Auto Differential [[id number]]  (Abnormal) Collected: 02/22/25 1037    Specimen: Blood Updated: 02/22/25 1052     WBC 9.89 10*3/mm3      RBC 3.17 10*6/mm3      Hemoglobin 9.1 g/dL      Hematocrit 28.2 %      MCV 89.0 fL      MCH 28.7 pg      MCHC 32.3 g/dL      RDW 12.8 %      RDW-SD 41.6 fl      MPV 10.3 fL      Platelets 181 10*3/mm3      Neutrophil % 77.5 %      Lymphocyte % 8.5 %      Monocyte % 12.8 %      Eosinophil % 0.4 %      Basophil % 0.4 %      Immature Grans % 0.4 %      Neutrophils, Absolute 7.66 10*3/mm3      Lymphocytes, Absolute 0.84 10*3/mm3      Monocytes, Absolute 1.27 10*3/mm3      Eosinophils, Absolute 0.04 10*3/mm3      Basophils, Absolute 0.04 10*3/mm3      Immature Grans, Absolute 0.04 10*3/mm3      nRBC 0.0 /100 WBC     POC Glucose Once [[id number]]  (Abnormal) Collected: 02/22/25 0735    Specimen: Blood Updated: 02/22/25 1015     Glucose 164 mg/dL      Comment: Serial Number: [id number]:  [id number]               Imaging Results (Last 24 Hours)       Procedure Component Value Units Date/Time    CT Angiogram Neck [[id number]] Collected: 02/22/25 2111     Updated: 02/22/25 2124    Narrative:      CT ANGIOGRAM HEAD W AI ANALYSIS OF LVO  CT ANGIOGRAM NECK    Date of Exam: 2/22/2025 8:17 PM EST    Indication: stroke, code stroke.    Comparison: MR angiogram 1/17/2019.    Technique:  CTA of the head and neck was performed after the uneventful intravenous administration of iodinated contrast. Reconstructed coronal and sagittal images were also obtained. In addition, a 3-D volume rendered image was created for   interpretation. Automated exposure control and iterative reconstruction methods were used.      Findings:  Aorta: There is mild atherosclerotic plaque in the visualized thoracic aorta. There is conventional three-vessel arch anatomy. There is mild plaque at the origins of the aortic branch vessels. The left subclavian artery is widely patent. There is a   high-grade focal stenosis at the origin of the right subclavian artery estimated to be greater than 50%. The distal right subclavian artery is widely patent.    Right carotid: There is a high-grade focal stenosis at the origin of the right common carotid artery estimated to be 70%. There is mild nonstenosing plaque in the mid and distal CCA. There is severe densely calcified plaque at the carotid bifurcation   extending into the proximal ICA resulting in high-grade stenosis and possibly short segment occlusion. The degree of stenosis is certainly greater than 70%, but difficult to measure due to heavy calcification and some streak artifact in this area. There   is otherwise mild plaque in the mid and distal cervical ICA. ECA and distal branches are patent. There is mildly stenosing segmental calcified plaque in the intracranial ICA segments. There is a large patent P-comm. There is a large patent A-Comm. The A1   segment and distal ARELIS branches appear patent. The right M1 segment and distal MCA branches appear patent.    Left carotid: The left CCA demonstrates mild nonstenosing plaque. There is severe calcified plaque at the carotid bifurcation extending into the proximal ICA. There is high-grade stenosis at the origin of the cervical ICA estimated to be 70%. ECA and   distal branches are patent. Mid and distal cervical ICA are patent.  There is mildly stenosing segmental calcific plaque present throughout the intracranial ICA segments. No definite P-comm. There is a high-grade stenosis at the origin of the A1 segment.   Distal ARELIS branches are patent. The M1 segment and distal MCA branches appear patent.    Posterior circulation: There is right vertebral artery dominance. There is mild narrowing at the proximal right vertebral artery. The remainder of the right vertebral artery is widely patent throughout the neck. 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 The left V4 segment reconstitutes via a PICA branch. There is a focal moderate grade stenosis in the mid right V4 segment due to a circumferential calcified plaque. There is mild narrowing of the   basilar artery. Superior cerebellar arteries are patent. There is a hypoplastic right P1 segment due to fetal origin PCA on the right. The left P1 segment is normal. Distal PCA branches appear patent with mild atherosclerotic irregularities.    Nonvascular findings: There is chronic small vessel ischemic change in the brain. Orbits are symmetric. The paranasal sinuses and left mastoid air cells appear well aerated. There is a right mastoid effusion. Salivary glands appear symmetric. No evidence   of a neck mass or lymphadenopathy. Thyroid gland is homogeneous. There is patchy airspace disease in the visualized upper lungs, which appears dependent and could reflect pulmonary edema or pneumonia. There are small bilateral pleural effusions   partially seen. No acute osseous abnormality or destructive bone lesion. There is mild cervical spondylosis.      Impression:      Impression:  1.Severe densely calcified plaque at the right carotid bifurcation extending into the proximal ICA resulting in high-grade stenosis and possibly short segment occlusion. The degree of stenosis is certainly greater than 70%, but difficult to measure due   to heavy calcification  and some streak artifact in this area.  2.Severe calcified plaque at the left carotid bifurcation extending into the proximal ICA resulting in high-grade stenosis estimated to be 70%.  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 The left V4 segment reconstitutes via a PICA branch.  4.High-grade focal stenosis at the origin of the right subclavian artery estimated to be greater than 50%.  5.High-grade stenosis at the origin of the right common carotid artery estimated to be 70%.  6.High-grade stenosis at the origin of the left A1 segment.  7.Moderate grade stenosis in the mid right V4 segment.  8.Patchy airspace disease in the visualized upper lungs with small bilateral pleural effusions. This could reflect pulmonary edema or pneumonia.            Electronically Signed: Serjio Troy MD    2/22/2025 9:22 PM EST    Workstation ID: [id number]    CT Angiogram Head w AI Analysis of LVO [[id number]] Collected: 02/22/25 2111     Updated: 02/22/25 2124    Narrative:      CT ANGIOGRAM HEAD W AI ANALYSIS OF LVO  CT ANGIOGRAM NECK    Date of Exam: 2/22/2025 8:17 PM EST    Indication: stroke, code stroke.    Comparison: MR angiogram 1/17/2019.    Technique: CTA of the head and neck was performed after the uneventful intravenous administration of iodinated contrast. Reconstructed coronal and sagittal images were also obtained. In addition, a 3-D volume rendered image was created for   interpretation. Automated exposure control and iterative reconstruction methods were used.      Findings:  Aorta: There is mild atherosclerotic plaque in the visualized thoracic aorta. There is conventional three-vessel arch anatomy. There is mild plaque at the origins of the aortic branch vessels. The left subclavian artery is widely patent. There is a   high-grade focal stenosis at the origin of the right subclavian artery estimated to be greater than 50%. The distal right subclavian artery is widely  patent.    Right carotid: There is a high-grade focal stenosis at the origin of the right common carotid artery estimated to be 70%. There is mild nonstenosing plaque in the mid and distal CCA. There is severe densely calcified plaque at the carotid bifurcation   extending into the proximal ICA resulting in high-grade stenosis and possibly short segment occlusion. The degree of stenosis is certainly greater than 70%, but difficult to measure due to heavy calcification and some streak artifact in this area. There   is otherwise mild plaque in the mid and distal cervical ICA. ECA and distal branches are patent. There is mildly stenosing segmental calcified plaque in the intracranial ICA segments. There is a large patent P-comm. There is a large patent A-Comm. The A1   segment and distal ARELIS branches appear patent. The right M1 segment and distal MCA branches appear patent.    Left carotid: The left CCA demonstrates mild nonstenosing plaque. There is severe calcified plaque at the carotid bifurcation extending into the proximal ICA. There is high-grade stenosis at the origin of the cervical ICA estimated to be 70%. ECA and   distal branches are patent. Mid and distal cervical ICA are patent. There is mildly stenosing segmental calcific plaque present throughout the intracranial ICA segments. No definite P-comm. There is a high-grade stenosis at the origin of the A1 segment.   Distal ARELIS branches are patent. The M1 segment and distal MCA branches appear patent.    Posterior circulation: There is right vertebral artery dominance. There is mild narrowing at the proximal right vertebral artery. The remainder of the right vertebral artery is widely patent throughout the neck. 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 The left V4 segment reconstitutes via a PICA branch. There is a focal moderate grade stenosis in the mid right V4 segment due to a circumferential calcified  plaque. There is mild narrowing of the   basilar artery. Superior cerebellar arteries are patent. There is a hypoplastic right P1 segment due to fetal origin PCA on the right. The left P1 segment is normal. Distal PCA branches appear patent with mild atherosclerotic irregularities.    Nonvascular findings: There is chronic small vessel ischemic change in the brain. Orbits are symmetric. The paranasal sinuses and left mastoid air cells appear well aerated. There is a right mastoid effusion. Salivary glands appear symmetric. No evidence   of a neck mass or lymphadenopathy. Thyroid gland is homogeneous. There is patchy airspace disease in the visualized upper lungs, which appears dependent and could reflect pulmonary edema or pneumonia. There are small bilateral pleural effusions   partially seen. No acute osseous abnormality or destructive bone lesion. There is mild cervical spondylosis.      Impression:      Impression:  1.Severe densely calcified plaque at the right carotid bifurcation extending into the proximal ICA resulting in high-grade stenosis and possibly short segment occlusion. The degree of stenosis is certainly greater than 70%, but difficult to measure due   to heavy calcification and some streak artifact in this area.  2.Severe calcified plaque at the left carotid bifurcation extending into the proximal ICA resulting in high-grade stenosis estimated to be 70%.  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 The left V4 segment reconstitutes via a PICA branch.  4.High-grade focal stenosis at the origin of the right subclavian artery estimated to be greater than 50%.  5.High-grade stenosis at the origin of the right common carotid artery estimated to be 70%.  6.High-grade stenosis at the origin of the left A1 segment.  7.Moderate grade stenosis in the mid right V4 segment.  8.Patchy airspace disease in the visualized upper lungs with small bilateral pleural effusions.  This could reflect pulmonary edema or pneumonia.            Electronically Signed: Serjio Troy MD    2/22/2025 9:22 PM EST    Workstation ID: [id number]    CT CEREBRAL PERFUSION WITH & WITHOUT CONTRAST [[id number]] Collected: 02/22/25 2034     Updated: 02/22/25 2039    Narrative:      CT CEREBRAL PERFUSION W WO CONTRAST    Date of Exam: 2/22/2025 8:14 PM EST    Indication: Neuro deficit, acute, stroke suspected stroke.     Comparison: None available.    Technique: Axial CT images of the brain were obtained prior to and after the administration of iodinated contrast. CT Perfusion protocol was utilized. Automated post processing was performed by RAPID software and submitted to PACS for interpretation.   Automated exposure control and iterative reconstruction was utilized.      Findings:  Cerebral blood flow maps appears normal. There is a small area of diminished cerebral blood volume in the lateral left parietal lobe of uncertain clinical significance. There are prominent Tmax abnormalities with prolongation in the right cerebral   hemisphere, which could be related to ischemia and could certainly be due to high-grade stenosis in the right carotid artery as seen on CT angiogram. There is no obvious core infarct or ischemic penumbra.    CBF<30% volume: 0 mL  Tmax>6sec volume: 4 mL  Mismatch volume: 4 mL  Mismatch ratio: Infinite      Impression:      Impression:    1. No definite core infarct or ischemic penumbra.  2. There is prolongation of Tmax in the right MCA distribution, which likely corresponds to perfusion abnormalities arising from high-grade right carotid stenosis seen on CTA.        Electronically Signed: Serjio Troy MD    2/22/2025 8:37 PM EST    Workstation ID: [id number]    CT Head Without Contrast Stroke Protocol [[id number]] Collected: 02/22/25 2005     Updated: 02/22/25 2012    Narrative:      CT HEAD WO CONTRAST STROKE PROTOCOL    Date of Exam: 2/22/2025 8:00 PM EST    Indication: Neuro deficit,  "acute, stroke suspected stroke.    Comparison: Brain MRI/MRA 1/17/2018.    Technique: Axial CT images were obtained of the head without contrast administration.  Reconstructed coronal and sagittal images were also obtained. Automated exposure control and iterative construction methods were used.    Scan Time: 2003 hours  Results discussed with nursing staff on the floor at 2009 hours       Findings:  Superficial soft tissues appear within normal limits. The calvarium is intact.  Paranasal sinuses and mastoid air cells appear well aerated. Orbits appear symmetric. There is no acute intracranial hemorrhage.  No mass effect or midline shift.  No   abnormal extra-axial collections.  Gray-white differentiation is within normal limits. There is mild patchy white matter hypoattenuation. The ventricles appear normal in size and configuration for the patient's age. There are extensive intracranial   atherosclerotic calcifications.      Impression:      Impression:  1.No acute intracranial abnormality.  2.Mild chronic small vessel ischemic change.  3.Extensive intracranial atherosclerotic calcifications.            Electronically Signed: Serjio Troy MD    2/22/2025 8:10 PM EST    Workstation ID: [id number]            X-ray reviewed personally by physician.      ECG reviewed personally by physician  ECG/EMG Results (most recent)       None              PHYSICAL EXAMINATION:  VITAL SIGNS: Temp:  [97.3 °F (36.3 °C)-98.6 °F (37 °C)] 98.3 °F (36.8 °C)  Heart Rate:  [] 101  Resp:  [16-22] 19  BP: (110-174)/(62-91) 174/82   Flowsheet Rows      Flowsheet Row First Filed Value   Admission Height 175.3 cm (69\") Documented at 02/14/2025 1449   Admission Weight 90.7 kg (200 lb) Documented at 02/14/2025 1449          GENERAL: Alert and oriented x3, afebrile. Vital signs stable, appeared comfortable, appears stated age nondistressed, and appears stated age. Generalized weakness. Responds to questions appropriately.   HEENT: " Normocephalic, atraumatic. Pupils equal, round and reactive to light. Extraocular movements intact bilaterally. Trachea midline.  Mucous membranes are moist.   NECK: Supple. No JVD. Trachea midline, no LAD  CHEST: Clear to auscultation bilaterally anteriorly; no rale, rhonchi, or wheezes  HEART: Regular rate and rhythm.  No new rubs  or gallops, stable 1 out of 6 murmur  ABDOMEN: Soft, nontender, nondistended. Bowel sounds are otherwise positive.   EXTREMITIES: No clubbing, cyanosis, mild dependent edema. Moves all extremities  SKIN: Warm and dry. No ecchymoses, petechiae or rashes.   MUSCULOSKELETAL: No bony abnormalities. Range of motion normal. No crepitus. No joint swelling or erythema.   NEUROLOGIC: Cranial nerves II-XII intact bilaterally. No focal neurologic deficits. Mini-Mental status exam was deferred.   LYMPHATICS: No lymphadenopathy.     ASSESSMENT AND PLAN:     Acute on chronic diastolic CHF  GWEN on CKD  Bladder tumor s/p resection  CAD with bypass remotely, LIMA to LAD and vein graft to the obtuse marginal and acute marginal  History of laser TMR with prior chest pain  Diabetes is comorbidity  Peripheral vascular disease with history of carotid stenosis  Essential hypertension  Hyperlipidemia  Bladder mass status post excision  Moderate aortic valve stenosis by echo February 2025 with preserved EF 60% biplane    Add Lasix 40 IV twice daily  Consult nephrology  EF normal 50 to 55%, minimal adele-infarct ischemia the old inferior infarct by stress testing previously  Patient remains off Plavix and aspirin, restart when okay from urologic standpoint, no hematuria seen  Advance antihypertensives as indicated, avoid bradycardia heart rates less than 60s  Continue carvedilol, amlodipine, hydralazine, Imdur, minoxidil establish dosing from prior hospitalization, goal blood pressure simply less than 150 systolic    Recheck labs tomorrow  Follow hemodynamics closely with response to therapy    No ischemic  evaluation indicated at this time    Ryan Milton MD, PhD      Thank you for the consult is a pleasure to be involved in his care      Ryan Milton MD  02/23/25  09:35 EST

## 2025-02-23 NOTE — PLAN OF CARE
"Goal Outcome Evaluation:  Plan of Care Reviewed With: patient           Outcome Evaluation: Pt is an 84 y.o. M adm to [facility] on 02/20/25 for scheduled bladder resection. He has remained inpt following his sx due to urinary retention & recently had a meek catheter placed. On 02/22/25 he had s/s of CVA with reported inc confusion, left side weakness, left facial droop & repeating words per nursing report. A code stroke was called.  CT (-) for acute injury. Pt has refused MRI. CTA of the head and neck demonstrates \"severe carotid disease, severe densely calcified plaque at the right carotid bifurcation extending into the proximal ICA resulting in high-grade stenosis possibly short segment of occlusion, severe calcified plaque in the left carotid bifurcation high-grade stenosis possible greater than 70%; 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.High-grade stenosis focal origin of the right subclavian greater than 50%  High-grade stenosis right common carotid 70%; High-grade stenosis origin left A1; Moderate stenosis right V4. Vascular surgery has been consulted. PMHx significant for CAD, PVD, htn, suspected bladder malignancy, BPH, T2DM, and tobacco use. At baseline, pt resides with spouse, adult son and grandson in a bi-level home with 6-7 steps & HR. Pt reports he is IND with I/ADLs, driving and mobility. He states he sometimes uses a RW. He has a walk-in shower with shower chair for bathing. Upon assessment, pt A&O x4 with no reports or pain. Pt has no s/s of deficits related to BUE weakness, coordination, sensation, cognition or visual/perceptual deficits. He is CGA for ADL transfers, room mobility,  toileting & LB dressing tasks due to min dynamic balance deficits. He would benefit from use of a RW for balance during standing ADLs & mobility to dec risk of falls. Pt expressed inc anxiety towards the end of the eval. Pt stating he feels like his anxiety is getting worse with his recent medical status. Pt given " v.c. for relaxation tech. OT will continue to follow for tx & recommends home with HH upon discharge.    Anticipated Discharge Disposition (OT): home with home health

## 2025-02-23 NOTE — PLAN OF CARE
Goal Outcome Evaluation:      Patient was seen for dysphagia evaluation per MD order. Most recent chest x-ray revealed patchy airspace disease visualized upper lungs with small bilateral pleural effusion. This could reflect pulmonary edema or pneumonia. According to patient, baseline diet is regular. Patient reports no history of CVA, pneumonia cervical neck surgery. Patient does have history of GERD. Patient has upper and lower dentures. Oral mechanism examination revealed patient demonstrated full ROM and mobility of lingual and labial structures. Noted lingual deviation to the right upon protrusion. Noted mild left facial droop. Lingual and jaw strength was adequate. Palatal movement was present. Noted no gag reflex.  Patient was sitting upright in recliner at 90 degree hip flexion prior to any PO trials. Provided trials of thins via spoon x 3, cup x 3 and straw x 3 each. No overt s/s of aspiration was observed. No wet vocal quality was detected. Given digital palpation, swallow was timely. Provided trials of puree x 3. No clearing of throat, cough and/or vocal changes were identified. Oral transit was timely as he initiated swallow response within 3 seconds in duration. Patient cleared oral cavity effectively between bites without evidence of oral residue. Provided trials of mixed consistency x 3. Patient displayed adequate rotary chewing. No oral residue was evident following the swallow. Patient displayed adequate rotary chewing. Noted patient with running nose following small bite of STC x 1. Assessment was interrupted by nursing because she had to transfer patient over to the bed before someone came in to place a catheter. Therefore, no other trials were attempted Patient demonstrated functional oral and pharyngeal swallow of the consistencies attempted. Therefore, it is recommended that patient receive a mechanical soft diet at this time. ST will follow to assure safety and adequacy .of diet and  independence with safe swallow strategies. Furthermore, ongoing assessment of upgrade will need to be pursued since assessment was interrupted and could not be completed.                        SLP Swallowing Diagnosis: functional oral phase, functional pharyngeal phase (02/23/25 8662)

## 2025-02-23 NOTE — CONSULTS
[facility]   Teleneurology Note    Patient Name: Dave Peguero  : 1940  MRN: [id number]  Primary Care Physician: Fidel Le MD  Referring Site: [place]  Location of Neurologist: [place]    Subjective   Teleneurology Initial Data           Neurologist Evaluation Date: 25     Date Last Known Well: 25 Time Last Known Well: 1830     History     Chief Complaint: Left hemiparesis  HPI: Dave Peguero is an 84-year-old male former smoker with a past medical history of bilateral internal carotid artery stenoses 40-50% per MRA , chronic left vertebral artery occlusion, chronic left A1 stenoses, CKD 3 (creatinine 3.09), bladder tumor s/p resection 2025, CAD s/p CABG (), MI (), DMT2 (hemoglobin A1c 7.6%), hypertension, hyperlipidemia, neuropathy who presented to [facility] on 2025 for scheduled bladder resection who has remained inpatient due to complications of urinary retention.  TeleNeurology called at 7:49 PM.  CT head negative for hemorrhage.  I called patient's daughter Grisel while patient was obtaining his scans.  Per daughter, the patient was last in his normal health on 2025 at 6:30 PM.  At baseline, the patient lives independently and performs all of his activities of daily living independently.  Baseline mRS 0.  Patient is not a candidate for IV thrombolytics given recent severe bleeding secondary to bladder cancer as well as recent bladder resection 2025. I did authorize CT angiogram head and neck with perfusion (noted creatinine 3.09) given significant left hemiparesis with concerns for LVO and not a candidate for IV thrombolytics.  The patient normally takes clopidogrel for coronary artery disease, however this had not yet been resumed following surgery.    Stroke Risk Factors/ Pertinent Data     Stroke risk factors: myocardial infarction, coronary artery disease, hypertension, head trauma, obesity/ physical inactivity, malignancy,  diabetes, dyslipidemia, sleep apnea, smoking, carotid artery disease  Anticoagulants prior to arrival: none  Antiplatelets prior to arrival: none  Statins prior to arrival: none     Scoring Scales     Modified Medway Scale  Pre-Stroke Modified Medway Scale: 0 - No Symptoms at all.  Intracerebral Hemmorhage (ICH) Score  Age>=80: yes    NIH Stroke Scale           Interval: baseline  1a. Level of Consciousness: 0-->Alert, keenly responsive  1b. LOC Questions: 0-->Answers both questions correctly  1c. LOC Commands: 0-->Performs both tasks correctly  2. Best Gaze: 0-->Normal  3. Visual: 0-->No visual loss  4. Facial Palsy: 1-->Minor paralysis (flattened nasolabial fold, asymmetry on smiling)  5a. Motor Arm, Left: 0-->No drift, limb holds 90 (or 45) degrees for full 10 secs  5b. Motor Arm, Right: 0-->No drift, limb holds 90 (or 45) degrees for full 10 secs  6a. Motor Leg, Left: 0-->No drift, leg holds 30 degree position for full 5 secs  6b. Motor Leg, Right: 0-->No drift, leg holds 30 degree position for full 5 secs  7. Limb Ataxia: 0-->Absent  8. Sensory: 0-->Normal, no sensory loss  9. Best Language: 0-->No aphasia, normal  10. Dysarthria: 1-->Mild-to-moderate dysarthria, patient slurs at least some words and, at worst, can be understood with some difficulty  11. Extinction and Inattention (formerly Neglect): 0-->No abnormality  Total (NIH Stroke Scale): 2     Review of Systems     Review of Systems  Negative except as above  Objective   Exam     Exam performed with the help of support staff from the referring site  Neurological Exam  NIHSS as above  Result Review    Results          Personal review of CNS imaging:(Official report by radiologist pending)  Imaging  CT Imaging Review: CT Imaging reviewed, NO acute infarct/ hemorrhage seen  CTA Imaging Review: CTA Imaging reviewed, NO large vessel occlusion or severe stenosis seen  CT Perfusion Review: CT perfusion reviewed, ABNORMAL (favored to be  artifact)    Thrombolytic   Thrombolytics: thrombolytic not given  Thrombolytic Relative Exclusions for All Patients: Recent GI or urinary tract hemorrhage (within previous 21 days), Major surgery or previous trauma within past 14 days     Assessment & Plan   Assessment/ Plan     Assessment:  Dave Peguero is an 84-year-old male former smoker with a past medical history of bilateral internal carotid artery stenoses 40-50% per MRA 2019, chronic left vertebral artery occlusion, chronic left A1 stenoses, CKD 3 (creatinine 3.09), bladder tumor s/p resection 2/20/2025, CAD s/p CABG (2018), MI (2018), DMT2 (hemoglobin A1c 7.6%), hypertension, hyperlipidemia, neuropathy who presented to [facility] on 2/20/2025 for scheduled bladder resection who has remained inpatient due to complications of urinary retention.  TeleNeurology called at 7:49 PM.  CT head negative for hemorrhage. The patient was last in his normal health on 2/22/2025 at 6:30 PM. Baseline mRS 0.  Patient is not a candidate for IV thrombolytics given recent severe bleeding secondary to bladder cancer as well as recent bladder resection 2/20/2025. I did authorize CT angiogram head and neck with perfusion (noted creatinine 3.09) due to reported significant left hemiparesis per charge nurse who called to give the history, concern for LVO.  CT perfusion reveals a left-sided perfusion abnormality, favored to be artifact, as symptoms are on opposite side.  CT angiogram head and neck negative for LVO.  I examined the patient at bedside on video after his scans.  His symptoms are improving and NIHSS is currently 2.  TTE LVEF 61-65%, severe aortic stenoses, left atrium    # Mild left facial droop and dysarthria   Rapidly improved left hemiparesis and ataxia.  Suspect small right sided infarct versus transient ischemic attack.    -MRI brain without contrast  -If patient is unable to obtain MRI (alert for metal implant), will diagnose stroke clinically if  persistent symptoms.  -Restart clopidogrel 75 mg daily when cleared by surgery.  Duration 21 days per [facility] protocol from stroke neurology perspective  -Recommend start aspirin 81 mg tonight; team will reach out to surgeon for clearance.  -Increase atorvastatin to 40 mg daily LDL 84  -Permissive blood pressure as able for 24-48 hours  -Every 4 hour neurochecks  -Please stat page neurology with any change in neurologic status    # Severe aortic stenoses  -Patient would benefit from cardiology consult and possible ANA.  No thrombus noted on TTE.    # Chronic kidney disease  Patient did receive contrast dose overnight.  -Trend creatinine  -Nephrology following, team will page and plans to start IVF overnight     Plan was discussed with the patient at bedside as well as his daughter Grisel via telephone.  Discussed with covering hospitalist Dr. Hansen and charge nurse.  Thank you for this consult.  Please call with questions.    Daisy Capellan MD   02/22/25  20:41 EST  [facility] STROKE NEURO         Disposition          Medical Decision Making  Medical Data Reviewed: Data reviewed including: clinical labs, radiology and/or medical tests, Independent review of CNS images  Length of visit: 60 minutes    I, Daisy Capellan MD, saw the patient on 02/22/25 at   for an initial in-patient or emergency room telememedicine face to face consult using interactive technology for 60 minutes. The location of the patient was [place]. I was located at [place].    I have proceeded with this evaluation at the request of the referring practitioner as it is felt to be an emergency setting and no appropriate specialist is available to perform this evaluation. The originating hospital has reported that this is the correct patient and has obtained consent from the patient/surrogate to perform this telemedicine evaluation(including obtaining history, performing examination and reviewing data provided by the patient an/or originating site of  care provider)    I have introduced myself to the patient, provided my credentials, disclosed my location, and determined that, based on review of the patient's chart and discussion with the patient's primary team, telemedicine via a HIPAA compliant, real-time, face-to-face two-way, interactive audio and video platform is an appropriate and effective means of providing the service.    The patient/surrogate has a right to refuse this evaluation as they have been explained risks including potential loss of confidentiality, benefits, alternatives, and the potential need for subsequent face-to-face care. In this evaluation, we will be providing recommendations only.  The ultimate decision to follow or not to follow these recommendations will be left to the bedside treating/requesting practitioner.    The patient/surrogate has been notified that other healthcare professionals including technical person may be involved in this A/V evaluation.  All laws concerning confidentiality and patient access to medical records and copies of medical records apply to telemedicine.  The patient/surrogate has received the originating site's Health Notice of Privacy Practices.    Daisy Capellan MD

## 2025-02-23 NOTE — PROGRESS NOTES
[facility] MEDICINE SERVICE  DAILY PROGRESS NOTE    NAME: Dave Peguero  : 1940  MRN: [id number]      LOS: 1 day     PROVIDER OF SERVICE: Margie Jenkins MD    Chief Complaint: Bladder cancer    Subjective:     Interval History:  History taken from: patient  Patient had an episode of left facial droop and dysarthria yesterday night and stroke team saw patient   Today reports symptoms have resolved         Review of Systems:   As above    Objective:     Vital Signs  Temp:  [97.3 °F (36.3 °C)-98.6 °F (37 °C)] 98 °F (36.7 °C)  Heart Rate:  [] 97  Resp:  [17-] 22  BP: (110-174)/(62-91) 164/73  Flow (L/min) (Oxygen Therapy):  [2-3] 3   Body mass index is 29.66 kg/m².    Physical Exam  Physical Exam  AOx3 NAD  RRR S1-S2 audible  Lung CTA  Abdomen soft nontender     Diagnostic Data    Results from last 7 days   Lab Units 25  0429 25  1037 25  2229   WBC 10*3/mm3 7.29   < > 8.51   HEMOGLOBIN g/dL 8.5*   < > 8.7*   HEMATOCRIT % 26.7*   < > 27.0*   PLATELETS 10*3/mm3 170   < > 166   GLUCOSE mg/dL 168*   < > 368*   CREATININE mg/dL 2.88*   < > 2.74*   BUN mg/dL 39*   < > 40*   SODIUM mmol/L 136   < > 136   POTASSIUM mmol/L 4.5   < > 4.9   AST (SGOT) U/L  --   --  17   ALT (SGPT) U/L  --   --  13   ALK PHOS U/L  --   --  71   BILIRUBIN mg/dL  --   --  <0.2   ANION GAP mmol/L 13.2   < > 10.6    < > = values in this interval not displayed.       CT Angiogram Neck    Result Date: 2025  Impression: 1.Severe densely calcified plaque at the right carotid bifurcation extending into the proximal ICA resulting in high-grade stenosis and possibly short segment occlusion. The degree of stenosis is certainly greater than 70%, but difficult to measure due to heavy calcification and some streak artifact in this area. 2.Severe calcified plaque at the left carotid bifurcation extending into the proximal ICA resulting in high-grade stenosis estimated to be 70%. 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 The left V4 segment reconstitutes via a PICA branch. 4.High-grade focal stenosis at the origin of the right subclavian artery estimated to be greater than 50%. 5.High-grade stenosis at the origin of the right common carotid artery estimated to be 70%. 6.High-grade stenosis at the origin of the left A1 segment. 7.Moderate grade stenosis in the mid right V4 segment. 8.Patchy airspace disease in the visualized upper lungs with small bilateral pleural effusions. This could reflect pulmonary edema or pneumonia. Electronically Signed: Serjio Troy MD  2/22/2025 9:22 PM EST  Workstation ID: [id number]    CT Angiogram Head w AI Analysis of LVO    Result Date: 2/22/2025  Impression: 1.Severe densely calcified plaque at the right carotid bifurcation extending into the proximal ICA resulting in high-grade stenosis and possibly short segment occlusion. The degree of stenosis is certainly greater than 70%, but difficult to measure due to heavy calcification and some streak artifact in this area. 2.Severe calcified plaque at the left carotid bifurcation extending into the proximal ICA resulting in high-grade stenosis estimated to be 70%. 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 The left V4 segment reconstitutes via a PICA branch. 4.High-grade focal stenosis at the origin of the right subclavian artery estimated to be greater than 50%. 5.High-grade stenosis at the origin of the right common carotid artery estimated to be 70%. 6.High-grade stenosis at the origin of the left A1 segment. 7.Moderate grade stenosis in the mid right V4 segment. 8.Patchy airspace disease in the visualized upper lungs with small bilateral pleural effusions. This could reflect pulmonary edema or pneumonia. Electronically Signed: Serjio Troy MD  2/22/2025 9:22 PM EST  Workstation ID: [id number]    CT CEREBRAL PERFUSION WITH & WITHOUT CONTRAST    Result Date:  2/22/2025  Impression: 1. No definite core infarct or ischemic penumbra. 2. There is prolongation of Tmax in the right MCA distribution, which likely corresponds to perfusion abnormalities arising from high-grade right carotid stenosis seen on CTA. Electronically Signed: Serjio Troy MD  2/22/2025 8:37 PM EST  Workstation ID: [id number]    CT Head Without Contrast Stroke Protocol    Result Date: 2/22/2025  Impression: 1.No acute intracranial abnormality. 2.Mild chronic small vessel ischemic change. 3.Extensive intracranial atherosclerotic calcifications. Electronically Signed: Serjio Troy MD  2/22/2025 8:10 PM EST  Workstation ID: [id number]    CT Abdomen Pelvis Without Contrast    Result Date: 2/21/2025  Impression: 1.There is bladder wall thickening/indistinctness, concerning for cystitis. Small amount of air is seen within the bladder lumen which may be related to recent instrumentation or infection with gas-forming organism. Please correlate with urinalysis. 2.There is mild left hydronephrosis without obstructing calculus or mass identified. This could represent ascending infection or recently passed calculus. 3.Bilateral renal hilar vascular calcifications. Additional nonobstructive nephrolithiasis cannot be entirely excluded. 4.Bilateral lung base opacities may represent edema or infiltrates. Small bilateral pleural effusions are partially imaged. 5.Additional findings as detailed above. Electronically Signed: Enrike Coleman MD  2/21/2025 4:35 PM EST  Workstation ID: [id number]       I reviewed the patient's new clinical results.    Assessment/Plan:     Active and Resolved Problems  Active Hospital Problems    Diagnosis  POA    **Bladder cancer [C67.9]  Yes    Bladder mass [N32.89]  Yes      Resolved Hospital Problems   No resolved problems to display.     Mild left facial droop and dysarthria   Rapidly improved left hemiparesis and ataxia.  Suspect small right sided infarct versus transient ischemic  attack.  Neurology on board   MRI pending   Given the patient severe atherosclerotic disease including intracranial atherosclerotic disease, if/when cleared by the surgical team, neurology recommend starting high-dose aspirin 325 mg daily and clopidogrel 75 mg daily for at least 90 days. Will need to follow-up outpatient with vascular neurology after discharge.  -Recommend start aspirin 81 mg tonight; team will reach out to surgeon for clearance.  -Vascular surgery consulted for severe carotid disease and high-grade stenoses  -Increase atorvastatin to 40 mg daily LDL 84  -Permissive blood pressure as able for 24-48 hours  -Every 4 hour neurochecks      Acute hypoxic respiratory failure  Wheeze, question of COPD  Acute heart failure exacerbation  GWEN, hypovolemic?  -Improving with diuresis, continue.    - Continue 20 mg p.o. prednisone.  Wean oxygen as tolerated   -Nephrology on board, appreciate recs      Bladder mass  -UA as of 2/10/2025: 21-50 RBCs, 6-10 WBCs, nitrite negative, bacteria negative  -Urine protein creatinine ratio 5, 969  -Postop day 2 of cystoscopy transurethral resection of bladder tumor  -Pain control appropriate.     Constipation  -MiraLAX ordered, suppository as available.     GWEN on CKD  CKD  Trend renal function labs.  See above     CAD  PVD  HLD  -Continue statin, metoprolol.    -Appreciate urology's recommendations on when to resume AC     DM2  -Accu-Cheks before meals and at bedtime  -SSI while inpatient  -CCD     Tobacco use  -Tobacco cessation  -Nicotine patch         VTE Prophylaxis:  Mechanical VTE prophylaxis orders are present.             Disposition Planning:     Barriers to Discharge:medical stability   Anticipated Date of Discharge: 1-2 days   Place of Discharge: home      Time: 35 minutes     Code Status and Medical Interventions: No CPR (Do Not Attempt to Resuscitate); Limited Support; No intubation (DNI)   Ordered at: 02/20/25 3140     Medical Intervention Limits:    No  intubation (DNI)     Level Of Support Discussed With:    Patient     Code Status (Patient has no pulse and is not breathing):    No CPR (Do Not Attempt to Resuscitate)     Medical Interventions (Patient has pulse or is breathing):    Limited Support       Signature: Electronically signed by Margie Jenkins MD, 02/23/25, 12:17 EST.  [facility]

## 2025-02-23 NOTE — THERAPY EVALUATION
Patient Name: Dave Peguero  : 1940    MRN: [id number]                              Today's Date: 2025       Admit Date: 2025    Visit Dx:     ICD-10-CM ICD-9-CM   1. Bladder tumor  D49.4 239.4   2. 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     Atherosclerosis of native arteries of extremities with intermittent claudication, left leg     Cancer     breast cancer hx    Carotid artery stenosis     Cerebral vascular disease     CHF (congestive heart failure)     Coronary artery disease     Diabetes mellitus     type 1    Hyperlipidemia     Hypertension     Kidney stone     Myocardial infarction     x 3     Past Surgical History:   Procedure Laterality Date    CARDIAC CATHETERIZATION      CORONARY ARTERY BYPASS GRAFT      TRIPLE    DENTAL PROCEDURE      all teeth have been pulled    FEMORAL ARTERY STENT Bilateral     JOINT REPLACEMENT Right     knee    PROSTATE SURGERY      TRANSURETHRAL RESECTION OF BLADDER TUMOR N/A 2025    Procedure: CYSTOSCOPY TRANSURETHRAL RESECTION OF BLADDER TUMOR;  Surgeon: Kevin Rodriguez MD;  Location: [facility];  Service: Urology;  Laterality: N/A;      General Information       Row Name 25 1329          OT Time and Intention    Document Type evaluation  -DT     Mode of Treatment occupational therapy  -DT     Patient Effort good  -DT       Row Name 25 1329          General Information    Patient Profile Reviewed yes  -DT     Prior Level of Function independent:;all household mobility;ADL's;using stairs;home management;driving;community mobility  Pt states he uses a RW sometimes.  -DT     Existing  Precautions/Restrictions fall;other (see comments)  meek catheter  -DT       Row Name 02/23/25 1329          Living Environment    People in Home spouse;child(savannah), adult;grandchild(savannah)  -DT       Row Name 02/23/25 1329          Home Main Entrance    Number of Stairs, Main Entrance seven  -DT     Stair Railings, Main Entrance railings safe and in good condition  -DT       Row Name 02/23/25 1329          Stairs Within Home, Primary    Stairs, Within Home, Primary 6-7 steps going up & down each way in bi-level home.  -DT     Number of Stairs, Within Home, Primary six;seven  -DT     Stair Railings, Within Home, Primary railings safe and in good condition  -DT       Row Name 02/23/25 1329          Cognition    Orientation Status (Cognition) oriented x 4  -DT       Row Name 02/23/25 1329          Safety Issues/Impairments Affecting Functional Mobility    Impairments Affecting Function (Mobility) balance;endurance/activity tolerance;shortness of breath  -DT               User Key  (r) = Recorded By, (t) = Taken By, (c) = Cosigned By      Initials Name Provider Type    DT Sofía Núñez, OT Occupational Therapist                     Mobility/ADL's       Row Name 02/23/25 1335          Bed Mobility    Bed Mobility bed mobility (all) activities  -DT     All Activities, [place] (Bed Mobility) independent  -DT       Row Name 02/23/25 1335          Transfers    Transfers sit-stand transfer;stand-sit transfer;toilet transfer  -DT       Row Name 02/23/25 1335          Sit-Stand Transfer    Sit-Stand [place] (Transfers) contact guard  -DT       Row Name 02/23/25 1335          Stand-Sit Transfer    Stand-Sit [place] (Transfers) contact guard  -DT       Row Name 02/23/25 1335          Toilet Transfer    [place] Level (Toilet Transfer) contact guard  -DT     Assistive Device (Toilet Transfer) grab bars/safety frame  -DT     Comment, (Toilet Transfer) low commode in bathroom.  -DT       Row Name 02/23/25  1335          Functional Mobility    Functional Mobility- Ind. Level contact guard assist  -DT     Functional Mobility-Distance (Feet) 20  -DT     Functional Mobility- Comment Pt reaching out to walls, furniture to steady himself. Recommend use of RW. No LOB detected. Pt stating he should probably be using RW most of the time at home.  -DT     Patient was able to Ambulate yes  -DT       Row Name 02/23/25 1335          Activities of Daily Living    BADL Assessment/Intervention toileting;lower body dressing  -DT       Row Name 02/23/25 1335          Toileting Assessment/Training    [place] Level (Toileting) toileting skills;standby assist  -DT     Position (Toileting) supported sitting;unsupported standing  -DT       Row Name 02/23/25 1335          Lower Body Dressing Assessment/Training    [place] Level (Lower Body Dressing) lower body dressing skills;standby assist  -DT     Position (Lower Body Dressing) supported sitting;unsupported standing  -DT               User Key  (r) = Recorded By, (t) = Taken By, (c) = Cosigned By      Initials Name Provider Type    DT Sofía Núñez, OT Occupational Therapist                   Obj/Interventions       Row Name 02/23/25 1337          Sensory Assessment (Somatosensory)    Sensory Assessment (Somatosensory) sensation intact  -DT       [facility] Name 02/23/25 1337          Vision Assessment/Intervention    Visual Impairment/Limitations WFL  -DT       Row Name 02/23/25 1337          Range of Motion Comprehensive    General Range of Motion no range of motion deficits identified  -DT       Row Name 02/23/25 1337          Strength Comprehensive (MMT)    General Manual Muscle Testing (MMT) Assessment no strength deficits identified  -DT       Row Name 02/23/25 1337          Motor Skills    Motor Skills functional endurance;coordination  -DT     Coordination WFL  -DT     Functional Endurance fair+  -DT       Row Name 02/23/25 1337          Balance    Balance Assessment  sitting static balance;sitting dynamic balance;standing static balance;standing dynamic balance  -DT     Static Sitting Balance independent  -DT     Dynamic Sitting Balance independent  -DT     Position, Sitting Balance unsupported  -DT     Static Standing Balance standby assist  -DT     Dynamic Standing Balance contact guard  -DT     Position/Device Used, Standing Balance unsupported  -DT               User Key  (r) = Recorded By, (t) = Taken By, (c) = Cosigned By      Initials Name Provider Type    DT Sofía Núñez, OT Occupational Therapist                   Goals/Plan       Row Name 02/23/25 [facility]          Transfer Goal 1 (OT)    Activity/Assistive Device (Transfer Goal 1, OT) transfers, all  -DT     [place] Level/Cues Needed (Transfer Goal 1, OT) modified independence  -DT     Time Frame (Transfer Goal 1, OT) long term goal (LTG);2 weeks  -DT       Row Name 02/23/25 [facility]          Dressing Goal 1 (OT)    Activity/Device (Dressing Goal 1, OT) lower body dressing  -DT     [place]/Cues Needed (Dressing Goal 1, OT) modified independence  -DT     Time Frame (Dressing Goal 1, OT) 2 weeks;long term goal (LTG)  -DT       Row Name 02/23/25 [facility]          Toileting Goal 1 (OT)    Activity/Device (Toileting Goal 1, OT) toileting skills, all  -DT     [place] Level/Cues Needed (Toileting Goal 1, OT) modified independence  -DT     Time Frame (Toileting Goal 1, OT) long term goal (LTG);2 weeks  -DT       Row Name 02/23/25 135          Therapy Assessment/Plan (OT)    Planned Therapy Interventions (OT) activity tolerance training;functional balance retraining;neuromuscular control/coordination retraining;patient/caregiver education/training;strengthening exercise;ROM/therapeutic exercise;transfer/mobility retraining;occupation/activity based interventions  -DT               User Key  (r) = Recorded By, (t) = Taken By, (c) = Cosigned By      Initials Name Provider Type    DT Sofía Núñez, OT  "Occupational Therapist                   Clinical Impression       Row Name 02/23/25 1338          Pain Assessment    Pretreatment Pain Rating 0/10 - no pain  -DT     Posttreatment Pain Rating 0/10 - no pain  -DT       Row Name 02/23/25 1338          Plan of Care Review    Plan of Care Reviewed With patient  -DT     Outcome Evaluation Pt is an 84 y.o. M adm to [facility] on 02/20/25 for scheduled bladder resection. He has remained inpt following his sx due to urinary retention & recently had a meek catheter placed. On 02/22/25 he had s/s of CVA with reported inc confusion, left side weakness, left facial droop & repeating words per nursing report. A code stroke was called.  CT (-) for acute injury. Pt has refused MRI. CTA of the head and neck demonstrates \"severe carotid disease, severe densely calcified plaque at the right carotid bifurcation extending into the proximal ICA resulting in high-grade stenosis possibly short segment of occlusion, severe calcified plaque in the left carotid bifurcation high-grade stenosis possible greater than 70%; 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.High-grade stenosis focal origin of the right subclavian greater than 50%  High-grade stenosis right common carotid 70%; High-grade stenosis origin left A1; Moderate stenosis right V4. Vascular surgery has been consulted. PMHx significant for CAD, PVD, htn, suspected bladder malignancy, BPH, T2DM, and tobacco use. At baseline, pt resides with spouse, adult son and grandson in a bi-level home with 6-7 steps & HR. Pt reports he is IND with I/ADLs, driving and mobility. He states he sometimes uses a RW. He has a walk-in shower with shower chair for bathing. Upon assessment, pt A&O x4 with no reports or pain. Pt has no s/s of deficits related to BUE weakness, coordination, sensation, cognition or visual/perceptual deficits. He is CGA for ADL transfers, room mobility,  toileting & LB dressing tasks due to min dynamic balance deficits. He " would benefit from use of a RW for balance during standing ADLs & mobility to dec risk of falls. Pt expressed inc anxiety towards the end of the eval. Pt stating he feels like his anxiety is getting worse with his recent medical status. Pt given v.c. for relaxation tech. OT will continue to follow for tx & recommends home with HH upon discharge.  -DT       Row Name 02/23/25 1338          Therapy Assessment/Plan (OT)    Rehab Potential (OT) good  -DT     Criteria for Skilled Therapeutic Interventions Met (OT) yes;meets criteria;skilled treatment is necessary  -DT     Therapy Frequency (OT) 3 times/wk  -DT     Predicted Duration of Therapy Intervention (OT) until d/c  -DT       Row Name 02/23/25 1338          Therapy Plan Review/Discharge Plan (OT)    Anticipated Discharge Disposition (OT) home with home health  -DT       Row Name 02/23/25 1338          Vital Signs    Post Systolic BP Rehab 164  -DT     Post Treatment Diastolic BP 73  -DT     Pre SpO2 (%) 92  -DT     O2 Delivery Pre Treatment room air  -DT     Post SpO2 (%) 92  -DT     O2 Delivery Post Treatment room air  -DT       Row Name 02/23/25 1338          Positioning and Restraints    Pre-Treatment Position in bed  -DT     Post Treatment Position bed  -DT     In Bed notified nsg;supine;call light within reach;encouraged to call for assist;exit alarm on;side rails up x2;with other staff  with nursing assistant  -DT               User Key  (r) = Recorded By, (t) = Taken By, (c) = Cosigned By      Initials Name Provider Type    DT Sofía Núñez, OT Occupational Therapist                   Outcome Measures       Row Name 02/23/25 1000 02/23/25 0855       How much help from another person do you currently need...    Turning from your back to your side while in flat bed without using bedrails? 3  -DH 4  -CM    Moving from lying on back to sitting on the side of a flat bed without bedrails? 3  -DH 4  -CM    Moving to and from a bed to a chair (including a  wheelchair)? 3  -DH 4  -CM    Standing up from a chair using your arms (e.g., wheelchair, bedside chair)? 3  -DH 4  -CM    Climbing 3-5 steps with a railing? 3  -DH 3  -CM    To walk in hospital room? 3  -DH 3  -CM    AM-PAC 6 Clicks Score (PT) 18  -DH 22  -CM      Row Name 02/23/25 0431          How much help from another person do you currently need...    Turning from your back to your side while in flat bed without using bedrails? 3  -PM     Moving from lying on back to sitting on the side of a flat bed without bedrails? 3  -PM     Moving to and from a bed to a chair (including a wheelchair)? 3  -PM     Standing up from a chair using your arms (e.g., wheelchair, bedside chair)? 3  -PM     Climbing 3-5 steps with a railing? 3  -PM     To walk in hospital room? 3  -PM     AM-PAC 6 Clicks Score (PT) 18  -PM       Row Name 02/23/25 1352          Modified [place] Scale    Pre-Stroke Modified [place] Scale 0 - No Symptoms at all.  -DT     Modified [place] Scale 4 - Moderately severe disability.  Unable to walk without assistance, and unable to attend to own bodily needs without assistance.  -DT       Row Name 02/23/25 1352          Functional Assessment    Outcome Measure Options Modified [place]  -DT               User Key  (r) = Recorded By, (t) = Taken By, (c) = Cosigned By      Initials Name Provider Type     Madison Crawford, RN Registered Nurse    Sofía Cortes, OT Occupational Therapist    Shamika Gleason, RN Registered Nurse    PM Denise Ortiz LPN Licensed Nurse                    Occupational Therapy Education       Title: PT OT SLP Therapies (Done)       Topic: Occupational Therapy (Done)       Point: ADL training (Done)       Description:   Instruct learner(s) on proper safety adaptation and remediation techniques during self care or transfers.   Instruct in proper use of assistive devices.                  Learning Progress Summary            Patient Acceptance, E,TB, VU,NR by DT at 2/23/2025  "[id number]    Comment: Role of OT, goals & POC, safety prec.                      Point: Precautions (Done)       Description:   Instruct learner(s) on prescribed precautions during self-care and functional transfers.                  Learning Progress Summary            Patient Acceptance, E,TB, VU,NR by DT at 2/23/2025 1356    Comment: Role of OT, goals & POC, safety prec.                      Point: Body mechanics (Done)       Description:   Instruct learner(s) on proper positioning and spine alignment during self-care, functional mobility activities and/or exercises.                  Learning Progress Summary            Patient Acceptance, E,TB, VU,NR by DT at 2/23/2025 1356    Comment: Role of OT, goals & POC, safety prec.                                      User Key       Initials Effective Dates Name Provider Type Discipline    DT 07/11/23 -  Sofía Núñez, OT Occupational Therapist OT                  OT Recommendation and Plan  Planned Therapy Interventions (OT): activity tolerance training, functional balance retraining, neuromuscular control/coordination retraining, patient/caregiver education/training, strengthening exercise, ROM/therapeutic exercise, transfer/mobility retraining, occupation/activity based interventions  Therapy Frequency (OT): 3 times/wk  Plan of Care Review  Plan of Care Reviewed With: patient  Outcome Evaluation: Pt is an 84 y.o. M adm to [facility] on 02/20/25 for scheduled bladder resection. He has remained inpt following his sx due to urinary retention & recently had a meek catheter placed. On 02/22/25 he had s/s of CVA with reported inc confusion, left side weakness, left facial droop & repeating words per nursing report. A code stroke was called.  CT (-) for acute injury. Pt has refused MRI. CTA of the head and neck demonstrates \"severe carotid disease, severe densely calcified plaque at the right carotid bifurcation extending into the proximal ICA resulting in high-grade stenosis " possibly short segment of occlusion, severe calcified plaque in the left carotid bifurcation high-grade stenosis possible greater than 70%; 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.High-grade stenosis focal origin of the right subclavian greater than 50%  High-grade stenosis right common carotid 70%; High-grade stenosis origin left A1; Moderate stenosis right V4. Vascular surgery has been consulted. PMHx significant for CAD, PVD, htn, suspected bladder malignancy, BPH, T2DM, and tobacco use. At baseline, pt resides with spouse, adult son and grandson in a bi-level home with 6-7 steps & HR. Pt reports he is IND with I/ADLs, driving and mobility. He states he sometimes uses a RW. He has a walk-in shower with shower chair for bathing. Upon assessment, pt A&O x4 with no reports or pain. Pt has no s/s of deficits related to BUE weakness, coordination, sensation, cognition or visual/perceptual deficits. He is CGA for ADL transfers, room mobility,  toileting & LB dressing tasks due to min dynamic balance deficits. He would benefit from use of a RW for balance during standing ADLs & mobility to dec risk of falls. Pt expressed inc anxiety towards the end of the eval. Pt stating he feels like his anxiety is getting worse with his recent medical status. Pt given v.c. for relaxation tech. OT will continue to follow for tx & recommends home with HH upon discharge.     Time Calculation:         Time Calculation- OT       Row Name 02/23/25 1356             Time Calculation- OT    OT Start Time 1051  -DT      OT Stop Time 1120  -DT      OT Time Calculation (min) 29 min  -DT      OT Received On 02/23/25  -DT      OT - Next Appointment 02/24/25  -DT      OT Goal Re-Cert Due Date 03/09/25  -DT                User Key  (r) = Recorded By, (t) = Taken By, (c) = Cosigned By      Initials Name Provider Type    Sofía Cortes, OT Occupational Therapist                           Sofía Núñez, OT  2/23/2025

## 2025-02-23 NOTE — NURSING NOTE
Patient was hard to wake up and when he  did, he appeares to be confused and repeating his words to me, noted left side of arm to be weak. Code stroke was called. Daughter is at bedside.

## 2025-02-23 NOTE — NURSING NOTE
Code stroke called for left sided weakness  patient is being combative and argumentative however he is moving all extremities with apparent left sided weakness.  In CT at this time

## 2025-02-23 NOTE — PROGRESS NOTES
Cardiology consult note  Ryan Milton MD, PhD    Patient Care Team:  Fidel Le MD as PCP - General (Internal Medicine)  Kristin Krause MD as PCP - Family Medicine      CONSULTATION: From hospitalist      CHIEF COMPLAINT: Shortness of breath and CHF      Subjective  Overnight events noted with TIA, left-sided weakness, seen by neurology  Chest pain-free  Hemodynamics labs vitals reviewed in EMR        CTA of the head and neck demonstrates severe carotid disease, severe densely calcified plaque at the right carotid bifurcation extending into the proximal ICA resulting in high-grade stenosis possibly short segment of occlusion, severe calcified plaque in the left carotid bifurcation high-grade stenosis possible greater than 70%  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  High-grade stenosis focal origin of the right subclavian greater than 50%  High-grade stenosis right common carotid 70%  High-grade stenosis origin left A1  Moderate stenosis right V4  Brain with chronic small vessel ischemic changes      Vascular surgery consulted  Aspirin restarted  Prefer Plavix okay okay with surgery team  Permissive hypertension discussed, avoid systolic less than 130  Neurology following as well  Resolving left-sided weakness    Off Lasix  Antihypertensives reviewed    review of systems otherwise negative x 14 point review of systems except as mentioned above  Historical data copied forward from previous encounters in EMR is unchanged          REVIEW OF SYSTEMS: Some 14-point review of systems is negative except what is mentioned in the HPI relative to the current complaint.     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         PAST SURGICAL HISTORY:   Past Surgical History:   Procedure Laterality Date    CARDIAC CATHETERIZATION      CORONARY ARTERY BYPASS GRAFT      TRIPLE    DENTAL PROCEDURE      all teeth have been pulled    FEMORAL ARTERY STENT Bilateral     JOINT REPLACEMENT Right     knee    PROSTATE SURGERY      TRANSURETHRAL RESECTION OF BLADDER TUMOR N/A 2/20/2025    Procedure: CYSTOSCOPY TRANSURETHRAL RESECTION OF BLADDER TUMOR;  Surgeon: Kevin Rodriguez MD;  Location: [facility] MAIN OR;  Service: Urology;  Laterality: N/A;          FAMILY HISTORY:   Family History   Problem Relation Age of Onset    COPD Mother     Heart disease Mother     Stroke Mother     Hypertension Mother     Cancer Father          SOCIAL HISTORY:   Social History     Socioeconomic History    Marital status:    Tobacco Use    Smoking status: Former     Current packs/day: 0.50     Types: Cigarettes    Smokeless tobacco: Never   Vaping Use    Vaping status: Never Used   Substance and Sexual Activity    Alcohol use: No    Drug use: No    Sexual activity: Defer       CURRENT MEDICATIONS:     Current Facility-Administered Medications:     acetaminophen (TYLENOL) tablet 650 mg, 650 mg, Oral, Q4H PRN, 650 mg at 02/22/25 1735 **OR** acetaminophen (TYLENOL) suppository 650 mg, 650 mg, Rectal, Q4H PRN, Kevin Rodriguez MD    amLODIPine (NORVASC) tablet 5 mg, 5 mg, Oral, BID, Kevin Rodriguez MD, 5 mg at 02/22/25 0826    atorvastatin (LIPITOR) tablet 40 mg, 40 mg, Oral, Nightly, Daisy Capellan MD    carvedilol (COREG) tablet 25 mg, 25 mg, Oral,  BID With Meals, Kevin Rodriguez MD, 25 mg at 02/22/25 1725    cetirizine (zyrTEC) tablet 10 mg, 10 mg, Oral, Daily, Kevin Rodriguez MD, 10 mg at 02/22/25 0826    dextrose (D50W) (25 g/50 mL) IV injection 25 g, 25 g, Intravenous, Q15 Min PRN, Naseem Kwok MD    dextrose (GLUTOSE) oral gel 15 g, 15 g, Oral, Q15 Min PRN, Naseem Kwok MD    glucagon (GLUCAGEN) injection 1 mg, 1 mg, Intramuscular, Q15 Min PRN, Naseem Kwok MD    hydrALAZINE (APRESOLINE) tablet 10 mg, 10 mg, Oral, TID, Kevin Rodriguez MD, 10 mg at 02/22/25 1726    HYDROcodone-acetaminophen (NORCO)  MG per tablet 1 tablet, 1 tablet, Oral, Q4H PRN, Juan Manuel Carmen MD, 1 tablet at 02/22/25 1056    insulin lispro (HUMALOG/ADMELOG) injection 2-9 Units, 2-9 Units, Subcutaneous, 4x Daily AC & at Bedtime, Kevin Rodriguez MD, 6 Units at 02/22/25 2240    ipratropium-albuterol (DUO-NEB) nebulizer solution 3 mL, 3 mL, Nebulization, Q4H PRN, Salome Villalta, APRN, 3 mL at 02/22/25 1452    isosorbide mononitrate (IMDUR) 24 hr tablet 30 mg, 30 mg, Oral, Q24H, Kevin Rodriguez MD, 30 mg at 02/22/25 0826    minoxidil (LONITEN) tablet 2.5 mg, 2.5 mg, Oral, Q24H, Kevin Rodriguez MD, 2.5 mg at 02/22/25 0826    nicotine (NICODERM CQ) 14 MG/24HR patch 1 patch, 1 patch, Transdermal, Q24H, Juan Manuel Carmen MD, 1 patch at 02/22/25 1234    ondansetron ODT (ZOFRAN-ODT) disintegrating tablet 4 mg, 4 mg, Oral, Q6H PRN, 4 mg at 02/21/25 1214 **OR** ondansetron (ZOFRAN) injection 4 mg, 4 mg, Intravenous, Q6H PRN, Kevin Rodriguez MD    pantoprazole (PROTONIX) EC tablet 40 mg, 40 mg, Oral, Daily, Kevin Rodriguez MD, 40 mg at 02/22/25 0826    polyethylene glycol (MIRALAX) packet 34 g, 34 g, Oral, Daily, Juan Manuel Carmen MD, 34 g at 02/22/25 1107    predniSONE (DELTASONE) tablet 20 mg, 20 mg, Oral, Daily, Juan Manuel Carmen MD, 20 mg at 02/22/25 1411    sucralfate (CARAFATE) tablet 1 g, 1 g, Oral, Every Other Day, Kevin Rodriguez MD, 1 g at 02/22/25  0826    tamsulosin (FLOMAX) 24 hr capsule 0.4 mg, 0.4 mg, Oral, Daily, Ryan Sequeira MD      DIAGNOSTIC DATA:     I reviewed the patient's new clinical results.    Lab Results (last 24 hours)       Procedure Component Value Units Date/Time    POC Glucose 4x Daily Before Meals & at Bedtime [[id number]]  (Abnormal) Collected: 02/23/25 0800    Specimen: Blood Updated: 02/23/25 0803     Glucose 159 mg/dL      Comment: Serial Number: [id number]:  [id number]       Basic Metabolic Panel [[id number]]  (Abnormal) Collected: 02/23/25 0429    Specimen: Blood from Arm, Right Updated: 02/23/25 0533     Glucose 168 mg/dL      BUN 39 mg/dL      Creatinine 2.88 mg/dL      Sodium 136 mmol/L      Potassium 4.5 mmol/L      Chloride 102 mmol/L      CO2 20.8 mmol/L      Calcium 8.7 mg/dL      BUN/Creatinine Ratio 13.5     Anion Gap 13.2 mmol/L      eGFR 20.6 mL/min/1.73     Narrative:      GFR Categories in Chronic Kidney Disease (CKD)      GFR Category          GFR (mL/min/1.73)    Interpretation  G1                     90 or greater         Normal or high (1)  G2                      60-89                Mild decrease (1)  G3a                   45-59                Mild to moderate decrease  G3b                   30-44                Moderate to severe decrease  G4                    15-29                Severe decrease  G5                    14 or less           Kidney failure          (1)In the absence of evidence of kidney disease, neither GFR category G1 or G2 fulfill the criteria for CKD.    eGFR calculation 2021 CKD-EPI creatinine equation, which does not include race as a factor    Extra Tubes [[id number]] Collected: 02/23/25 0429    Specimen: Blood from Arm, Right Updated: 02/23/25 0515    Narrative:      The following orders were created for panel order Extra Tubes.  Procedure                               Abnormality         Status                     ---------                               -----------          ------                     Light Blue Top[[id number]]                                   Final result                 Please view results for these tests on the individual orders.    Light Blue Top [[id number]] Collected: 02/23/25 0429    Specimen: Blood from Arm, Right Updated: 02/23/25 0515     Extra Tube Hold for add-ons.     Comment: Auto resulted       CBC & Differential [[id number]]  (Abnormal) Collected: 02/23/25 0429    Specimen: Blood from Arm, Right Updated: 02/23/25 0513    Narrative:      The following orders were created for panel order CBC & Differential.  Procedure                               Abnormality         Status                     ---------                               -----------         ------                     CBC Auto Differential[[id number]]        Abnormal            Final result                 Please view results for these tests on the individual orders.    CBC Auto Differential [[id number]]  (Abnormal) Collected: 02/23/25 0429    Specimen: Blood from Arm, Right Updated: 02/23/25 0513     WBC 7.29 10*3/mm3      RBC 3.00 10*6/mm3      Hemoglobin 8.5 g/dL      Hematocrit 26.7 %      MCV 89.0 fL      MCH 28.3 pg      MCHC 31.8 g/dL      RDW 12.6 %      RDW-SD 41.5 fl      MPV 10.6 fL      Platelets 170 10*3/mm3      Neutrophil % 77.7 %      Lymphocyte % 9.7 %      Monocyte % 11.5 %      Eosinophil % 0.0 %      Basophil % 0.3 %      Immature Grans % 0.8 %      Neutrophils, Absolute 5.66 10*3/mm3      Lymphocytes, Absolute 0.71 10*3/mm3      Monocytes, Absolute 0.84 10*3/mm3      Eosinophils, Absolute 0.00 10*3/mm3      Basophils, Absolute 0.02 10*3/mm3      Immature Grans, Absolute 0.06 10*3/mm3      nRBC 0.0 /100 WBC     Microalbumin / Creatinine Urine Ratio - Urine, Clean Catch [[id number]] Collected: 02/22/25 2242    Specimen: Urine, Clean Catch Updated: 02/22/25 2246    Protein / Creatinine Ratio, Urine - Urine, Clean Catch [[id number]] Collected: 02/22/25 2242    Specimen: Urine, Clean  Catch Updated: 02/22/25 2246    POC Glucose 4x Daily Before Meals & at Bedtime [[id number]]  (Abnormal) Collected: 02/22/25 2234    Specimen: Blood Updated: 02/22/25 2236     Glucose 279 mg/dL      Comment: Serial Number: [id number]:  [id number]       POC Glucose Once [[id number]]  (Abnormal) Collected: 02/22/25 1931    Specimen: Blood Updated: 02/22/25 1932     Glucose 298 mg/dL      Comment: Serial Number: [id number]:  [id number]       POC Glucose Once [[id number]]  (Abnormal) Collected: 02/22/25 1652    Specimen: Blood Updated: 02/22/25 1656     Glucose 233 mg/dL      Comment: Serial Number: [id number]:  [id number]       POC Glucose 4x Daily Before Meals & at Bedtime [[id number]]  (Abnormal) Collected: 02/22/25 1157    Specimen: Blood Updated: 02/22/25 1159     Glucose 176 mg/dL      Comment: Serial Number: [id number]:  [id number]       Basic Metabolic Panel [[id number]]  (Abnormal) Collected: 02/22/25 1037    Specimen: Blood Updated: 02/22/25 1111     Glucose 171 mg/dL      BUN 40 mg/dL      Creatinine 3.09 mg/dL      Sodium 136 mmol/L      Potassium 4.8 mmol/L      Chloride 104 mmol/L      CO2 21.5 mmol/L      Calcium 8.8 mg/dL      BUN/Creatinine Ratio 12.9     Anion Gap 10.5 mmol/L      eGFR 18.9 mL/min/1.73     Narrative:      GFR Categories in Chronic Kidney Disease (CKD)      GFR Category          GFR (mL/min/1.73)    Interpretation  G1                     90 or greater         Normal or high (1)  G2                      60-89                Mild decrease (1)  G3a                   45-59                Mild to moderate decrease  G3b                   30-44                Moderate to severe decrease  G4                    15-29                Severe decrease  G5                    14 or less           Kidney failure          (1)In the absence of evidence of kidney disease, neither GFR category G1 or G2 fulfill the criteria for CKD.    eGFR calculation 2021 CKD-EPI creatinine equation,  which does not include race as a factor    CBC & Differential [[id number]]  (Abnormal) Collected: 02/22/25 1037    Specimen: Blood Updated: 02/22/25 1052    Narrative:      The following orders were created for panel order CBC & Differential.  Procedure                               Abnormality         Status                     ---------                               -----------         ------                     CBC Auto Differential[[id number]]        Abnormal            Final result                 Please view results for these tests on the individual orders.    CBC Auto Differential [[id number]]  (Abnormal) Collected: 02/22/25 1037    Specimen: Blood Updated: 02/22/25 1052     WBC 9.89 10*3/mm3      RBC 3.17 10*6/mm3      Hemoglobin 9.1 g/dL      Hematocrit 28.2 %      MCV 89.0 fL      MCH 28.7 pg      MCHC 32.3 g/dL      RDW 12.8 %      RDW-SD 41.6 fl      MPV 10.3 fL      Platelets 181 10*3/mm3      Neutrophil % 77.5 %      Lymphocyte % 8.5 %      Monocyte % 12.8 %      Eosinophil % 0.4 %      Basophil % 0.4 %      Immature Grans % 0.4 %      Neutrophils, Absolute 7.66 10*3/mm3      Lymphocytes, Absolute 0.84 10*3/mm3      Monocytes, Absolute 1.27 10*3/mm3      Eosinophils, Absolute 0.04 10*3/mm3      Basophils, Absolute 0.04 10*3/mm3      Immature Grans, Absolute 0.04 10*3/mm3      nRBC 0.0 /100 WBC     POC Glucose Once [[id number]]  (Abnormal) Collected: 02/22/25 0735    Specimen: Blood Updated: 02/22/25 1015     Glucose 164 mg/dL      Comment: Serial Number: [id number]:  [id number]               Imaging Results (Last 24 Hours)       Procedure Component Value Units Date/Time    CT Angiogram Neck [[id number]] Collected: 02/22/25 2111     Updated: 02/22/25 2124    Narrative:      CT ANGIOGRAM HEAD W AI ANALYSIS OF LVO  CT ANGIOGRAM NECK    Date of Exam: 2/22/2025 8:17 PM EST    Indication: stroke, code stroke.    Comparison: MR angiogram 1/17/2019.    Technique: CTA of the head and neck was performed after  the uneventful intravenous administration of iodinated contrast. Reconstructed coronal and sagittal images were also obtained. In addition, a 3-D volume rendered image was created for   interpretation. Automated exposure control and iterative reconstruction methods were used.      Findings:  Aorta: There is mild atherosclerotic plaque in the visualized thoracic aorta. There is conventional three-vessel arch anatomy. There is mild plaque at the origins of the aortic branch vessels. The left subclavian artery is widely patent. There is a   high-grade focal stenosis at the origin of the right subclavian artery estimated to be greater than 50%. The distal right subclavian artery is widely patent.    Right carotid: There is a high-grade focal stenosis at the origin of the right common carotid artery estimated to be 70%. There is mild nonstenosing plaque in the mid and distal CCA. There is severe densely calcified plaque at the carotid bifurcation   extending into the proximal ICA resulting in high-grade stenosis and possibly short segment occlusion. The degree of stenosis is certainly greater than 70%, but difficult to measure due to heavy calcification and some streak artifact in this area. There   is otherwise mild plaque in the mid and distal cervical ICA. ECA and distal branches are patent. There is mildly stenosing segmental calcified plaque in the intracranial ICA segments. There is a large patent P-comm. There is a large patent A-Comm. The A1   segment and distal ARELIS branches appear patent. The right M1 segment and distal MCA branches appear patent.    Left carotid: The left CCA demonstrates mild nonstenosing plaque. There is severe calcified plaque at the carotid bifurcation extending into the proximal ICA. There is high-grade stenosis at the origin of the cervical ICA estimated to be 70%. ECA and   distal branches are patent. Mid and distal cervical ICA are patent. There is mildly stenosing segmental calcific  plaque present throughout the intracranial ICA segments. No definite P-comm. There is a high-grade stenosis at the origin of the A1 segment.   Distal ARELIS branches are patent. The M1 segment and distal MCA branches appear patent.    Posterior circulation: There is right vertebral artery dominance. There is mild narrowing at the proximal right vertebral artery. The remainder of the right vertebral artery is widely patent throughout the neck. 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 The left V4 segment reconstitutes via a PICA branch. There is a focal moderate grade stenosis in the mid right V4 segment due to a circumferential calcified plaque. There is mild narrowing of the   basilar artery. Superior cerebellar arteries are patent. There is a hypoplastic right P1 segment due to fetal origin PCA on the right. The left P1 segment is normal. Distal PCA branches appear patent with mild atherosclerotic irregularities.    Nonvascular findings: There is chronic small vessel ischemic change in the brain. Orbits are symmetric. The paranasal sinuses and left mastoid air cells appear well aerated. There is a right mastoid effusion. Salivary glands appear symmetric. No evidence   of a neck mass or lymphadenopathy. Thyroid gland is homogeneous. There is patchy airspace disease in the visualized upper lungs, which appears dependent and could reflect pulmonary edema or pneumonia. There are small bilateral pleural effusions   partially seen. No acute osseous abnormality or destructive bone lesion. There is mild cervical spondylosis.      Impression:      Impression:  1.Severe densely calcified plaque at the right carotid bifurcation extending into the proximal ICA resulting in high-grade stenosis and possibly short segment occlusion. The degree of stenosis is certainly greater than 70%, but difficult to measure due   to heavy calcification and some streak artifact in this  area.  2.Severe calcified plaque at the left carotid bifurcation extending into the proximal ICA resulting in high-grade stenosis estimated to be 70%.  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 The left V4 segment reconstitutes via a PICA branch.  4.High-grade focal stenosis at the origin of the right subclavian artery estimated to be greater than 50%.  5.High-grade stenosis at the origin of the right common carotid artery estimated to be 70%.  6.High-grade stenosis at the origin of the left A1 segment.  7.Moderate grade stenosis in the mid right V4 segment.  8.Patchy airspace disease in the visualized upper lungs with small bilateral pleural effusions. This could reflect pulmonary edema or pneumonia.            Electronically Signed: Serjio Troy MD    2/22/2025 9:22 PM EST    Workstation ID: [id number]    CT Angiogram Head w AI Analysis of LVO [[id number]] Collected: 02/22/25 2111     Updated: 02/22/25 2124    Narrative:      CT ANGIOGRAM HEAD W AI ANALYSIS OF LVO  CT ANGIOGRAM NECK    Date of Exam: 2/22/2025 8:17 PM EST    Indication: stroke, code stroke.    Comparison: MR angiogram 1/17/2019.    Technique: CTA of the head and neck was performed after the uneventful intravenous administration of iodinated contrast. Reconstructed coronal and sagittal images were also obtained. In addition, a 3-D volume rendered image was created for   interpretation. Automated exposure control and iterative reconstruction methods were used.      Findings:  Aorta: There is mild atherosclerotic plaque in the visualized thoracic aorta. There is conventional three-vessel arch anatomy. There is mild plaque at the origins of the aortic branch vessels. The left subclavian artery is widely patent. There is a   high-grade focal stenosis at the origin of the right subclavian artery estimated to be greater than 50%. The distal right subclavian artery is widely patent.    Right carotid: There is a  high-grade focal stenosis at the origin of the right common carotid artery estimated to be 70%. There is mild nonstenosing plaque in the mid and distal CCA. There is severe densely calcified plaque at the carotid bifurcation   extending into the proximal ICA resulting in high-grade stenosis and possibly short segment occlusion. The degree of stenosis is certainly greater than 70%, but difficult to measure due to heavy calcification and some streak artifact in this area. There   is otherwise mild plaque in the mid and distal cervical ICA. ECA and distal branches are patent. There is mildly stenosing segmental calcified plaque in the intracranial ICA segments. There is a large patent P-comm. There is a large patent A-Comm. The A1   segment and distal ARELIS branches appear patent. The right M1 segment and distal MCA branches appear patent.    Left carotid: The left CCA demonstrates mild nonstenosing plaque. There is severe calcified plaque at the carotid bifurcation extending into the proximal ICA. There is high-grade stenosis at the origin of the cervical ICA estimated to be 70%. ECA and   distal branches are patent. Mid and distal cervical ICA are patent. There is mildly stenosing segmental calcific plaque present throughout the intracranial ICA segments. No definite P-comm. There is a high-grade stenosis at the origin of the A1 segment.   Distal ARELIS branches are patent. The M1 segment and distal MCA branches appear patent.    Posterior circulation: There is right vertebral artery dominance. There is mild narrowing at the proximal right vertebral artery. The remainder of the right vertebral artery is widely patent throughout the neck. 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 The left V4 segment reconstitutes via a PICA branch. There is a focal moderate grade stenosis in the mid right V4 segment due to a circumferential calcified plaque. There is mild narrowing of the    basilar artery. Superior cerebellar arteries are patent. There is a hypoplastic right P1 segment due to fetal origin PCA on the right. The left P1 segment is normal. Distal PCA branches appear patent with mild atherosclerotic irregularities.    Nonvascular findings: There is chronic small vessel ischemic change in the brain. Orbits are symmetric. The paranasal sinuses and left mastoid air cells appear well aerated. There is a right mastoid effusion. Salivary glands appear symmetric. No evidence   of a neck mass or lymphadenopathy. Thyroid gland is homogeneous. There is patchy airspace disease in the visualized upper lungs, which appears dependent and could reflect pulmonary edema or pneumonia. There are small bilateral pleural effusions   partially seen. No acute osseous abnormality or destructive bone lesion. There is mild cervical spondylosis.      Impression:      Impression:  1.Severe densely calcified plaque at the right carotid bifurcation extending into the proximal ICA resulting in high-grade stenosis and possibly short segment occlusion. The degree of stenosis is certainly greater than 70%, but difficult to measure due   to heavy calcification and some streak artifact in this area.  2.Severe calcified plaque at the left carotid bifurcation extending into the proximal ICA resulting in high-grade stenosis estimated to be 70%.  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 The left V4 segment reconstitutes via a PICA branch.  4.High-grade focal stenosis at the origin of the right subclavian artery estimated to be greater than 50%.  5.High-grade stenosis at the origin of the right common carotid artery estimated to be 70%.  6.High-grade stenosis at the origin of the left A1 segment.  7.Moderate grade stenosis in the mid right V4 segment.  8.Patchy airspace disease in the visualized upper lungs with small bilateral pleural effusions. This could reflect pulmonary edema or  pneumonia.            Electronically Signed: Serjio Troy MD    2/22/2025 9:22 PM EST    Workstation ID: [id number]    CT CEREBRAL PERFUSION WITH & WITHOUT CONTRAST [[id number]] Collected: 02/22/25 2034     Updated: 02/22/25 2039    Narrative:      CT CEREBRAL PERFUSION W WO CONTRAST    Date of Exam: 2/22/2025 8:14 PM EST    Indication: Neuro deficit, acute, stroke suspected stroke.     Comparison: None available.    Technique: Axial CT images of the brain were obtained prior to and after the administration of iodinated contrast. CT Perfusion protocol was utilized. Automated post processing was performed by RAP[employer] software and submitted to PACS for interpretation.   Automated exposure control and iterative reconstruction was utilized.      Findings:  Cerebral blood flow maps appears normal. There is a small area of diminished cerebral blood volume in the lateral left parietal lobe of uncertain clinical significance. There are prominent Tmax abnormalities with prolongation in the right cerebral   hemisphere, which could be related to ischemia and could certainly be due to high-grade stenosis in the right carotid artery as seen on CT angiogram. There is no obvious core infarct or ischemic penumbra.    CBF<30% volume: 0 mL  Tmax>6sec volume: 4 mL  Mismatch volume: 4 mL  Mismatch ratio: Infinite      Impression:      Impression:    1. No definite core infarct or ischemic penumbra.  2. There is prolongation of Tmax in the right MCA distribution, which likely corresponds to perfusion abnormalities arising from high-grade right carotid stenosis seen on CTA.        Electronically Signed: Serjio Troy MD    2/22/2025 8:37 PM EST    Workstation ID: [id number]    CT Head Without Contrast Stroke Protocol [[id number]] Collected: 02/22/25 2005     Updated: 02/22/25 2012    Narrative:      CT HEAD WO CONTRAST STROKE PROTOCOL    Date of Exam: 2/22/2025 8:00 PM EST    Indication: Neuro deficit, acute, stroke suspected  "stroke.    Comparison: Brain MRI/MRA 1/17/2018.    Technique: Axial CT images were obtained of the head without contrast administration.  Reconstructed coronal and sagittal images were also obtained. Automated exposure control and iterative construction methods were used.    Scan Time: 2003 hours  Results discussed with nursing staff on the floor at 2009 hours       Findings:  Superficial soft tissues appear within normal limits. The calvarium is intact.  Paranasal sinuses and mastoid air cells appear well aerated. Orbits appear symmetric. There is no acute intracranial hemorrhage.  No mass effect or midline shift.  No   abnormal extra-axial collections.  Gray-white differentiation is within normal limits. There is mild patchy white matter hypoattenuation. The ventricles appear normal in size and configuration for the patient's age. There are extensive intracranial   atherosclerotic calcifications.      Impression:      Impression:  1.No acute intracranial abnormality.  2.Mild chronic small vessel ischemic change.  3.Extensive intracranial atherosclerotic calcifications.            Electronically Signed: Serjio Troy MD    2/22/2025 8:10 PM EST    Workstation ID: [id number]            X-ray reviewed personally by physician.      ECG reviewed personally by physician  ECG/EMG Results (most recent)       None              PHYSICAL EXAMINATION:  VITAL SIGNS: Temp:  [97.3 °F (36.3 °C)-98.6 °F (37 °C)] 98.3 °F (36.8 °C)  Heart Rate:  [] 101  Resp:  [16-22] 19  BP: (110-174)/(62-91) 174/82   Flowsheet Rows      Flowsheet Row First Filed Value   Admission Height 175.3 cm (69\") Documented at 02/14/2025 1449   Admission Weight 90.7 kg (200 lb) Documented at 02/14/2025 1449          GENERAL: Alert and oriented x3, afebrile. Vital signs stable,   HEENT: Normocephalic, atraumatic. Pupils equal,  NECK: Supple. No JVD. Trachea midline, no LAD  CHEST: Clear to auscultation bilaterally anteriorly; no rale, rhonchi, or " wheezes  HEART: Regular rate and rhythm.  No new rubs  or gallops, stable 1 out of 6 murmur  ABDOMEN: Soft, nontender, nondistended. Bowel sounds are otherwise positive.   EXTREMITIES: No clubbing, cyanosis, trace dependent edema only.  Moves all extremities  SKIN: Warm and dry. No ecchymoses, petechiae or rashes.   MUSCULOSKELETAL: No bony abnormalities. Range of motion normal. No crepitus. No joint swelling or erythema.   NEUROLOGIC: Residual mild left-sided weakness, right side okay, alert and oriented  Psych normal  LYMPHATICS: No lymphadenopathy.     ASSESSMENT AND PLAN:     Acute on chronic diastolic CHF  GWEN on CKD  Bladder tumor s/p resection  CAD with bypass remotely, LIMA to LAD and vein graft to the obtuse marginal and acute marginal  History of laser TMR with prior chest pain  Diabetes is comorbidity  Peripheral vascular disease with history of carotid stenosis  Essential hypertension  Hyperlipidemia  Bladder mass status post excision  Moderate aortic valve stenosis by echo February 2025 with preserved EF 60% biplane    TIA overnight  Improving neurologic symptoms  Aspirin added  Plavix will start tomorrow 75 mg daily  Continue current antihypertensives  High intensity statin  Neurology on board  Will ask vascular surgery to see and review his imaging    Appreciate nephrology involvement      Chest pain-free no evidence of ACS or unstable angina at this time  Heart failure appears compensated  Creatinine improved slightly today at 2.8  EF normal 50 to 55%, minimal [place]-infarct ischemia the old inferior infarct by stress testing previously  No cardiac testing indicated at this time    Titrating medications for blood pressure not less than 130 but not greater than 160, will follow neurology recommendations for parameters    Hemoglobin noted 8.5, follow closely, transfuse for hemoglobin less than 7    Ryan Milton MD, PhD      Thank you for the consult is a pleasure to be involved in his care      Ryan  Enrico Milton MD  02/23/25  09:50 EST

## 2025-02-23 NOTE — PLAN OF CARE
Problem: Adult Inpatient Plan of Care  Goal: Plan of Care Review  Outcome: Progressing  Goal: Patient-Specific Goal (Individualized)  Outcome: Progressing  Goal: Absence of Hospital-Acquired Illness or Injury  Outcome: Progressing  Intervention: Identify and Manage Fall Risk  Recent Flowsheet Documentation  Taken 2/23/2025 0431 by Mersmann, Denise, LPN  Safety Promotion/Fall Prevention:   safety round/check completed   nonskid shoes/slippers when out of bed   lighting adjusted   activity supervised   assistive device/personal items within reach   clutter free environment maintained  Taken 2/23/2025 0200 by Mersmann, Denise, LPN  Safety Promotion/Fall Prevention: activity supervised  Taken 2/22/2025 2345 by Mersmann, Denise, LPN  Safety Promotion/Fall Prevention:   activity supervised   lighting adjusted   safety round/check completed   nonskid shoes/slippers when out of bed   assistive device/personal items within reach   clutter free environment maintained  Intervention: Prevent Skin Injury  Recent Flowsheet Documentation  Taken 2/23/2025 0431 by Denise Ortiz LPN  Body Position: position changed independently  Taken 2/22/2025 2345 by Denise Ortiz LPN  Body Position:   position changed independently   weight shifting  Intervention: Prevent Infection  Recent Flowsheet Documentation  Taken 2/23/2025 0431 by Denise Ortiz LPN  Infection Prevention:   rest/sleep promoted   hand hygiene promoted  Taken 2/23/2025 0200 by Denise Ortiz LPN  Infection Prevention:   rest/sleep promoted   hand hygiene promoted  Taken 2/22/2025 2345 by Denise Ortiz LPN  Infection Prevention:   hand hygiene promoted   rest/sleep promoted  Goal: Optimal Comfort and Wellbeing  Outcome: Progressing  Intervention: Provide Person-Centered Care  Recent Flowsheet Documentation  Taken 2/22/2025 2345 by Denise Ortiz LPN  Trust Relationship/Rapport:   care explained   questions encouraged   questions  answered   thoughts/feelings acknowledged  Goal: Readiness for Transition of Care  Outcome: Progressing     Problem: Comorbidity Management  Goal: Blood Glucose Level Within Target Range  Outcome: Progressing   Goal Outcome Evaluation:

## 2025-02-23 NOTE — THERAPY EVALUATION
Patient Name: Dave Peguero  : 1940    MRN: [id number]                              Today's Date: 2025       Admit Date: 2025    Visit Dx:     ICD-10-CM ICD-9-CM   1. Bladder tumor  D49.4 239.4   2. 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     Atherosclerosis of native arteries of extremities with intermittent claudication, left leg     Cancer     breast cancer hx    Carotid artery stenosis     Cerebral vascular disease     CHF (congestive heart failure)     Coronary artery disease     Diabetes mellitus     type 1    Hyperlipidemia     Hypertension     Kidney stone     Myocardial infarction     x 3     Past Surgical History:   Procedure Laterality Date    CARDIAC CATHETERIZATION      CORONARY ARTERY BYPASS GRAFT      TRIPLE    DENTAL PROCEDURE      all teeth have been pulled    FEMORAL ARTERY STENT Bilateral     JOINT REPLACEMENT Right     knee    PROSTATE SURGERY      TRANSURETHRAL RESECTION OF BLADDER TUMOR N/A 2025    Procedure: CYSTOSCOPY TRANSURETHRAL RESECTION OF BLADDER TUMOR;  Surgeon: Kevin Rodriguez MD;  Location: [facility];  Service: Urology;  Laterality: N/A;      General Information       Row Name 25 1752          Physical Therapy Time and Intention    Document Type evaluation  -EL     Mode of Treatment physical therapy  -EL       Row Name 25 1752          General Information    Prior Level of Function independent:;all household mobility;ADL's;shopping;driving  -EL       Row Name 25 1752          Living Environment    People in Home child(savannah), adult;spouse  -EL       Row Name 25 1752          Stairs Within  Home, Primary    Stairs, Within Home, Primary 6-7 steps either way in bi level home  -EL       Row Name 02/23/25 1752          Cognition    Orientation Status (Cognition) oriented x 4  -EL       Row Name 02/23/25 1752          Safety Issues/Impairments Affecting Functional Mobility    Impairments Affecting Function (Mobility) balance;endurance/activity tolerance;shortness of breath  -EL               User Key  (r) = Recorded By, (t) = Taken By, (c) = Cosigned By      Initials Name Provider Type    Jonathan Benedict, JANIE Physical Therapist                   Mobility       Row Name 02/23/25 1753          Bed Mobility    Bed Mobility bed mobility (all) activities  -EL     All Activities, [place] (Bed Mobility) independent  -EL       Row Name 02/23/25 1753          Sit-Stand Transfer    Sit-Stand [place] (Transfers) contact guard  -       Row Name 02/23/25 1753          Gait/Stairs (Locomotion)    [place] Level (Gait) contact guard  -EL     Patient was able to Ambulate yes  -EL     Distance in Feet (Gait) 150  -EL     Deviations/Abnormal Patterns (Gait) gait speed decreased  -EL     Bilateral Gait Deviations forward flexed posture  -EL     Comment, (Gait/Stairs) reaching with UE for support  -EL               User Key  (r) = Recorded By, (t) = Taken By, (c) = Cosigned By      Initials Name Provider Type    Jonathan Benedict, JANIE Physical Therapist                   Obj/Interventions       Row Name 02/23/25 1754          Range of Motion Comprehensive    General Range of Motion bilateral lower extremity ROM WFL  -       Row Name 02/23/25 1754          Strength Comprehensive (MMT)    General Manual Muscle Testing (MMT) Assessment no strength deficits identified  -       Row Name 02/23/25 1754          Sensory Assessment (Somatosensory)    Sensory Assessment (Somatosensory) sensation intact  -EL               User Key  (r) = Recorded By, (t) = Taken By, (c) = Cosigned By      Initials Name Provider Type    EL  Jonathan Ulloa, PT Physical Therapist                   Goals/Plan       [facility] Name 02/23/25 1758          Transfer Goal 1 (PT)    Activity/Assistive Device (Transfer Goal 1, PT) transfers, all  -EL     [place] Level/Cues Needed (Transfer Goal 1, PT) independent  -EL     Time Frame (Transfer Goal 1, PT) long term goal (LTG);2 weeks  -[facility] Name 02/23/25 1758          Gait Training Goal 1 (PT)    Activity/Assistive Device (Gait Training Goal 1, PT) gait (walking locomotion)  -EL     [place] Level (Gait Training Goal 1, PT) independent  -EL     Distance (Gait Training Goal 1, PT) 200  -EL     Time Frame (Gait Training Goal 1, PT) long term goal (LTG);2 weeks  -[facility] Name 02/23/25 1758          Stairs Goal 1 (PT)    Activity/Assistive Device (Stairs Goal 1, PT) stairs, all skills  -EL     [place] Level/Cues Needed (Stairs Goal 1, PT) independent  -EL     Number of Stairs (Stairs Goal 1, PT) 7  -EL     Time Frame (Stairs Goal 1, PT) long term goal (LTG);2 weeks  -EL       Row Name 02/23/25 1758          Therapy Assessment/Plan (PT)    Planned Therapy Interventions (PT) balance training;neuromuscular re-education;bed mobility training;transfer training;gait training;patient/family education;strengthening  -EL               User Key  (r) = Recorded By, (t) = Taken By, (c) = Cosigned By      Initials Name Provider Type    EL Jonathan Ulloa, PT Physical Therapist                   Clinical Impression       [facility] Name 02/23/25 1754          Pain    Additional Documentation Pain Scale: FACES Pre/Post-Treatment (Group)  -EL       Row Name 02/23/25 1754          Pain Scale: FACES Pre/Post-Treatment    Pain: FACES Scale, Pretreatment 2-->hurts little bit  -EL     Posttreatment Pain Rating 2-->hurts little bit  -EL     Pre/Posttreatment Pain Comment catheter site  -EL       Row Name 02/23/25 1754          Plan of Care Review    Plan of Care Reviewed With patient  -EL     Outcome Evaluation Pt is an 84 y.o. M adm  "to [facility] on 02/20/25 for scheduled bladder resection. He has remained inpt following his sx due to urinary retention & recently had a meek catheter placed. On 02/22/25 he had s/s of CVA with reported inc confusion, left side weakness, left facial droop & repeating words per nursing report. A code stroke was called. CT (-) for acute injury. Pt has refused MRI. CTA of the head and neck demonstrates \"severe carotid disease, severe densely calcified plaque at the right carotid bifurcation extending into the proximal ICA resulting in high-grade stenosis possibly short segment of occlusion, severe calcified plaque in the left carotid bifurcation high-grade stenosis possible greater than 70%; 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.High-grade stenosis focal origin of the right subclavian greater than 50% High-grade stenosis right common carotid 70%; High-grade stenosis origin left A1; Moderate stenosis right V4. Vascular surgery has been consulted. PMHx significant for CAD, PVD, htn, suspected bladder malignancy, BPH, T2DM, and tobacco use. Pt states he is typically very independent, performing all ambulation and ADLs independently, and provides supervisory care for spouse. Pt this date demonstrates good mobitliy, requiring CGA for transfers and ambulation. Pt wiht mild impairments in mobiltiy, but appears safe to return home at d/. PT to follow while admitted, and reocmmendaiton is HHPT at d/.  -EL       Row Name 02/23/25 9547          Therapy Assessment/Plan (PT)    Rehab Potential (PT) good  -EL     Criteria for Skilled Interventions Met (PT) yes  -EL     Therapy Frequency (PT) 3 times/wk  -EL     Predicted Duration of Therapy Intervention (PT) until d/c  -EL       Row Name 02/23/25 8520          Vital Signs    O2 Delivery Pre Treatment supplemental O2  -EL     O2 Delivery Intra Treatment room air  -EL     O2 Delivery Post Treatment supplemental O2  -EL     Pre Patient Position Supine  -EL     Intra Patient " Position Standing  -EL     Post Patient Position Supine  -EL       Row Name 02/23/25 1754          Positioning and Restraints    Pre-Treatment Position in bed  -EL     Post Treatment Position bed  -EL     In Bed notified nsg;supine;call light within reach;encouraged to call for assist;exit alarm on  -EL               User Key  (r) = Recorded By, (t) = Taken By, (c) = Cosigned By      Initials Name Provider Type    Jonathan Benedict, JANIE Physical Therapist                   Outcome Measures       Row Name 02/23/25 1758 02/23/25 1000       How much help from another person do you currently need...    Turning from your back to your side while in flat bed without using bedrails? 4  -EL 3  -DH    Moving from lying on back to sitting on the side of a flat bed without bedrails? 3  -EL 3  -DH    Moving to and from a bed to a chair (including a wheelchair)? 3  -EL 3  -DH    Standing up from a chair using your arms (e.g., wheelchair, bedside chair)? 3  -EL 3  -DH    Climbing 3-5 steps with a railing? 3  -EL 3  -DH    To walk in hospital room? 3  -EL 3  -DH    AM-PAC 6 Clicks Score (PT) 19  -EL 18  -DH    Highest Level of Mobility Goal 6 --> Walk 10 steps or more  -EL 6 --> Walk 10 steps or more  -DH      Row Name 02/23/25 0855          How much help from another person do you currently need...    Turning from your back to your side while in flat bed without using bedrails? 4  -CM     Moving from lying on back to sitting on the side of a flat bed without bedrails? 4  -CM     Moving to and from a bed to a chair (including a wheelchair)? 4  -CM     Standing up from a chair using your arms (e.g., wheelchair, bedside chair)? 4  -CM     Climbing 3-5 steps with a railing? 3  -CM     To walk in hospital room? 3  -CM     AM-PAC 6 Clicks Score (PT) 22  -CM     Highest Level of Mobility Goal 7 --> Walk 25 feet or more  -CM       Row Name 02/23/25 1352          Modified Payne Scale    Pre-Stroke Modified Viola Scale 0 - No Symptoms at all.   "-DT     Modified Torrey Scale 4 - Moderately severe disability.  Unable to walk without assistance, and unable to attend to own bodily needs without assistance.  -DT       Row Name 02/23/25 1758 02/23/25 8303       Functional Assessment    Outcome Measure Options AM-PAC 6 Clicks Basic Mobility (PT)  -EL Modified Viola  -DT              User Key  (r) = Recorded By, (t) = Taken By, (c) = Cosigned By      Initials Name Provider Type    EL Jonathan Ulloa, PT Physical Therapist    Madison Marte, RN Registered Nurse    Sofía Cortes, OT Occupational Therapist    Shamika Gleason RN Registered Nurse                                 Physical Therapy Education       Title: PT OT SLP Therapies (Done)       Topic: Physical Therapy (Done)       Point: Mobility training (Done)       Learning Progress Summary            Patient Acceptance, E,TB, VU by  at 2/23/2025 1759                      Point: Precautions (Done)       Learning Progress Summary            Patient Acceptance, E,TB, VU by  at 2/23/2025 1759                                      User Key       Initials Effective Dates Name Provider Type Discipline     06/23/20 -  Jonathan Ulloa, PT Physical Therapist PT                  PT Recommendation and Plan  Planned Therapy Interventions (PT): balance training, neuromuscular re-education, bed mobility training, transfer training, gait training, patient/family education, strengthening  Outcome Evaluation: Pt is an 84 y.o. M adm to [facility] on 02/20/25 for scheduled bladder resection. He has remained inpt following his sx due to urinary retention & recently had a meek catheter placed. On 02/22/25 he had s/s of CVA with reported inc confusion, left side weakness, left facial droop & repeating words per nursing report. A code stroke was called. CT (-) for acute injury. Pt has refused MRI. CTA of the head and neck demonstrates \"severe carotid disease, severe densely calcified plaque at the right carotid bifurcation " extending into the proximal ICA resulting in high-grade stenosis possibly short segment of occlusion, severe calcified plaque in the left carotid bifurcation high-grade stenosis possible greater than 70%; 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.High-grade stenosis focal origin of the right subclavian greater than 50% High-grade stenosis right common carotid 70%; High-grade stenosis origin left A1; Moderate stenosis right V4. Vascular surgery has been consulted. PMHx significant for CAD, PVD, htn, suspected bladder malignancy, BPH, T2DM, and tobacco use. Pt states he is typically very independent, performing all ambulation and ADLs independently, and provides supervisory care for spouse. Pt this date demonstrates good mobitliy, requiring CGA for transfers and ambulation. Pt wiht mild impairments in mobiltiy, but appears safe to return home at d/c. PT to follow while admitted, and reocmmendaiton is HHPT at d/c.     Time Calculation:   PT Evaluation Complexity  History, PT Evaluation Complexity: 1-2 personal factors and/or comorbidities  Examination of Body Systems (PT Eval Complexity): total of 3 or more elements  Clinical Presentation (PT Evaluation Complexity): evolving  Clinical Decision Making (PT Evaluation Complexity): moderate complexity  Overall Complexity (PT Evaluation Complexity): moderate complexity     PT Charges       Row Name 02/23/25 1759             Time Calculation    Start Time 1423  -EL      Stop Time 1445  -EL      Time Calculation (min) 22 min  -EL      PT Received On 02/23/25  -EL      PT - Next Appointment 02/25/25  -EL      PT Goal Re-Cert Due Date 03/09/25  -EL                User Key  (r) = Recorded By, (t) = Taken By, (c) = Cosigned By      Initials Name Provider Type    Jonathan Benedict PT Physical Therapist                  Therapy Charges for Today       Code Description Service Date Service Provider Modifiers Qty    96156451571 HC PT EVAL MOD COMPLEXITY 4 2/23/2025 Jonathan Ulloa PT  GP 1            PT G-Codes  Outcome Measure Options: AM-PAC 6 Clicks Basic Mobility (PT)  AM-PAC 6 Clicks Score (PT): 19  Modified Viola Scale: 4 - Moderately severe disability.  Unable to walk without assistance, and unable to attend to own bodily needs without assistance.  PT Discharge Summary  Anticipated Discharge Disposition (PT): home with home health    Jonathan Ulloa, PT  2/23/2025

## 2025-02-23 NOTE — PLAN OF CARE
"Goal Outcome Evaluation:  Plan of Care Reviewed With: patient           Outcome Evaluation: Pt is an 84 y.o. M adm to [facility] on 02/20/25 for scheduled bladder resection. He has remained inpt following his sx due to urinary retention & recently had a meek catheter placed. On 02/22/25 he had s/s of CVA with reported inc confusion, left side weakness, left facial droop & repeating words per nursing report. A code stroke was called. CT (-) for acute injury. Pt has refused MRI. CTA of the head and neck demonstrates \"severe carotid disease, severe densely calcified plaque at the right carotid bifurcation extending into the proximal ICA resulting in high-grade stenosis possibly short segment of occlusion, severe calcified plaque in the left carotid bifurcation high-grade stenosis possible greater than 70%; 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.High-grade stenosis focal origin of the right subclavian greater than 50% High-grade stenosis right common carotid 70%; High-grade stenosis origin left A1; Moderate stenosis right V4. Vascular surgery has been consulted. PMHx significant for CAD, PVD, htn, suspected bladder malignancy, BPH, T2DM, and tobacco use. Pt states he is typically very independent, performing all ambulation and ADLs independently, and provides supervisory care for spouse. Pt this date demonstrates good mobitliy, requiring CGA for transfers and ambulation. Pt wiht mild impairments in mobiltiy, but appears safe to return home at d/c. PT to follow while admitted, and reocmmendaiton is HHPT at d/c.    Anticipated Discharge Disposition (PT): home with home health                        "

## 2025-02-23 NOTE — CONSULTS
Primary Care Provider: Kevin Rodriguez MD     Consult requested by: Hospitalist service    Reason for Consultation: Neurological evaluation-acute left-sided weakness    History taken from: patient chart RN    Chief complaint: Bladder tumor status post resection 2/20/2025       SUBJECTIVE:    History of present illness: Background per H&P: Dave Peguero is a 84 y.o. male who is former smoker with a past medical history of bilateral internal carotid artery stenoses 40-50% per MRA 2019, chronic left vertebral artery occlusion, chronic left A1 stenoses, CKD 3 (creatinine 3.09), bladder tumor s/p resection 2/20/2025, CAD s/p CABG (2018), MI (2018), DMT2 (hemoglobin A1c 7.6%), hypertension, hyperlipidemia, neuropathy who presented to [facility] on [facility]2025 for scheduled bladder resection who has remained inpatient due to complications of urinary retention. TeleNeurology called at 7:49 PM. CT head negative for hemorrhage. Per daughter, the patient was last in his normal health on 2/22/2025 at 6:30 PM. At baseline, the patient lives independently and performs all of his activities of daily living independently. Baseline mRS 0. Patient was not a candidate for IV thrombolytics given recent severe bleeding secondary to bladder cancer as well as recent bladder resection 2/20/2025. The patient normally takes clopidogrel for coronary artery disease, however this had not yet been resumed following surgery.  Teleneurology NIH score 2 (facial palsy, 1 and dysarthria, 1).     CTA showed significant atherosclerotic changes intracranially and extracranially.  Severe densely calcified plaques in the bilateral right carotid bifurcations measuring greater than 70%.  Chronic occlusion of the nondominant left vertebral artery.  High-grade focal stenosis of the origin of the right subclavian artery.  High-grade stenosis of the origin right common carotid artery greater than 70%.  High-grade stenosis of the origin of the left  A1 segment.  Moderate grade stenosis of the mid right V4 segment.    NIHSS 1    - Portions of the above HPI were copied from previous encounters and edited as appropriate. PMH as detailed below.     Review of Systems   Constitutional:  Negative for fever and unexpected weight change.   HENT:  Negative for ear pain, hearing loss, tinnitus, trouble swallowing and voice change.    Eyes:  Negative for photophobia and visual disturbance.   Respiratory:  Negative for chest tightness and shortness of breath.    Cardiovascular:  Negative for chest pain and palpitations.   Gastrointestinal:  Negative for nausea and vomiting.   Genitourinary:  Negative for difficulty urinating, dysuria, frequency and urgency.   Musculoskeletal:  Negative for back pain, gait problem, neck pain and neck stiffness.   Neurological:  Positive for facial asymmetry. Negative for dizziness, tremors, seizures, syncope, speech difficulty, weakness, light-headedness, numbness and headaches.   Psychiatric/Behavioral:  Negative for agitation, behavioral problems and confusion.    All other systems reviewed and are negative.         PATIENT HISTORY:  Past Medical History:   Diagnosis Date    Arthritis     Atherosclerosis of autologous vein bypass graft of right lower extremity with intermittent claudication     Atherosclerosis of native arteries of extremities with intermittent claudication, left leg     Cancer     breast cancer hx    Carotid artery stenosis     Cerebral vascular disease     CHF (congestive heart failure)     Coronary artery disease     Diabetes mellitus     type 1    Hyperlipidemia     Hypertension     Kidney stone     Myocardial infarction     x 3   ,   Past Surgical History:   Procedure Laterality Date    CARDIAC CATHETERIZATION      CORONARY ARTERY BYPASS GRAFT      TRIPLE    DENTAL PROCEDURE      all teeth have been pulled    FEMORAL ARTERY STENT Bilateral     JOINT REPLACEMENT Right     knee    PROSTATE SURGERY      TRANSURETHRAL  RESECTION OF BLADDER TUMOR N/A 2/20/2025    Procedure: CYSTOSCOPY TRANSURETHRAL RESECTION OF BLADDER TUMOR;  Surgeon: Kevin Rodriguez MD;  Location: [facility] MAIN OR;  Service: Urology;  Laterality: N/A;   ,   Family History   Problem Relation Age of Onset    COPD Mother     Heart disease Mother     Stroke Mother     Hypertension Mother     Cancer Father    ,   Social History     Tobacco Use    Smoking status: Former     Current packs/day: 0.50     Types: Cigarettes    Smokeless tobacco: Never   Vaping Use    Vaping status: Never Used   Substance Use Topics    Alcohol use: No    Drug use: No   ,   Prior to Admission medications    Medication Sig Start Date End Date Taking? Authorizing Provider   amLODIPine (NORVASC) 5 MG tablet Take 1 tablet by mouth 2 (Two) Times a Day. 12/9/24  Yes Sonal Harman MD   aspirin 81 MG EC tablet Take 1 tablet by mouth Daily.   Yes Sonal Harman MD   atorvastatin (LIPITOR) 20 MG tablet Take 1 tablet by mouth Every Evening.   Yes Sonal Harman MD   calcium carbonate (OS-RUTH) 600 MG tablet Take 1 tablet by mouth 2 (Two) Times a Day With Meals.   Yes Sonal Harman MD   carvedilol (COREG) 25 MG tablet Take 1 tablet by mouth 2 (Two) Times a Day With Meals. 2/13/25  Yes Brammell, Timothy Duane, MD   cetirizine (zyrTEC) 10 MG tablet Take 1 tablet by mouth Daily. 5/10/17  Yes Franc Lira Jr., MD   clopidogrel (PLAVIX) 75 MG tablet Take 1 tablet by mouth Daily.   Yes Sonal Harman MD   hydrALAZINE (APRESOLINE) 10 MG tablet Take 1 tablet by mouth 3 (Three) Times a Day. 2/13/25  Yes Brammell, Timothy Duane, MD   isosorbide mononitrate (IMDUR) 30 MG 24 hr tablet Take 1 tablet by mouth Daily. 2/14/25  Yes Brammell, Timothy Duane, MD   minoxidil (LONITEN) 2.5 MG tablet Take 1 tablet by mouth Daily. 2/14/25  Yes Brammell, Timothy Duane, MD   pantoprazole (PROTONIX) 40 MG EC tablet Take 1 tablet by mouth Daily.   Yes Sonal Harman MD   sucralfate  (CARAFATE) 1 g tablet Take 1 tablet by mouth Every Other Day.   Yes Provider, MD Sonal   cephalexin (KEFLEX) 500 MG capsule Take 1 capsule by mouth 3 (Three) Times a Day for 5 days. 2/20/25 2/25/25  Kevin Rodriguez MD   oxyCODONE-acetaminophen (Percocet) 5-325 MG per tablet Take 1 tablet by mouth Every 6 (Six) Hours As Needed for Moderate Pain. 2/20/25   Kevin Rodriguez MD    Allergies:  Benadryl [diphenhydramine]    Current Facility-Administered Medications   Medication Dose Route Frequency Provider Last Rate Last Admin    acetaminophen (TYLENOL) tablet 650 mg  650 mg Oral Q4H PRN Kevin Rodriguez MD   650 mg at 02/22/25 1735    Or    acetaminophen (TYLENOL) suppository 650 mg  650 mg Rectal Q4H PRN Kevin Rodriguez MD        [Held by provider] amLODIPine (NORVASC) tablet 5 mg  5 mg Oral BID Kevin Rodriguez MD   5 mg at 02/23/25 0825    atorvastatin (LIPITOR) tablet 40 mg  40 mg Oral Nightly Daisy Capellan MD        carvedilol (COREG) tablet 12.5 mg  12.5 mg Oral BID With Meals Ryan Milton MD        cetirizine (zyrTEC) tablet 10 mg  10 mg Oral Daily Kevin Rodriguez MD   10 mg at 02/23/25 0825    [START ON 2/24/2025] clopidogrel (PLAVIX) tablet 75 mg  75 mg Oral Daily Ryan Milton MD        dextrose (D50W) (25 g/50 mL) IV injection 25 g  25 g Intravenous Q15 Min PRN Naseem Kwok MD        dextrose (GLUTOSE) oral gel 15 g  15 g Oral Q15 Min PRN Naseem Kwok MD        glucagon (GLUCAGEN) injection 1 mg  1 mg Intramuscular Q15 Min PRN Naseem Kwok MD        [Held by provider] hydrALAZINE (APRESOLINE) tablet 10 mg  10 mg Oral TID Kevin Rodriguez MD   10 mg at 02/23/25 0825    HYDROcodone-acetaminophen (NORCO)  MG per tablet 1 tablet  1 tablet Oral Q4H PRN Juan Manuel Carmen MD   1 tablet at 02/22/25 1056    insulin lispro (HUMALOG/ADMELOG) injection 2-9 Units  2-9 Units Subcutaneous 4x Daily AC & at Bedtime Kevin Rodriguez MD   6 Units at 02/22/25  2240    ipratropium-albuterol (DUO-NEB) nebulizer solution 3 mL  3 mL Nebulization Q4H PRN Salome Villalta APRN   3 mL at 02/22/25 1452    [Held by provider] isosorbide mononitrate (IMDUR) 24 hr tablet 30 mg  30 mg Oral Q24H Kevin Rodriguez MD   30 mg at 02/22/25 0826    [Held by provider] minoxidil (LONITEN) tablet 2.5 mg  2.5 mg Oral Q24H Kevin Rodriguez MD   2.5 mg at 02/22/25 0826    nicotine (NICODERM CQ) 14 MG/24HR patch 1 patch  1 patch Transdermal Q24H Juan Manuel Carmen MD   1 patch at 02/22/25 1234    ondansetron ODT (ZOFRAN-ODT) disintegrating tablet 4 mg  4 mg Oral Q6H PRN Kevin Rodriguez MD   4 mg at 02/21/25 1214    Or    ondansetron (ZOFRAN) injection 4 mg  4 mg Intravenous Q6H PRN Kevin Rodriguez MD        pantoprazole (PROTONIX) EC tablet 40 mg  40 mg Oral Daily Kevin Rodriguez MD   40 mg at 02/23/25 0826    polyethylene glycol (MIRALAX) packet 34 g  34 g Oral Daily Juan Manuel Carmen MD   34 g at 02/23/25 0826    predniSONE (DELTASONE) tablet 20 mg  20 mg Oral Daily Juan Manuel Carmen MD   20 mg at 02/22/25 1411    sucralfate (CARAFATE) tablet 1 g  1 g Oral Every Other Day Kevin Rodriguez MD   1 g at 02/22/25 0826    tamsulosin (FLOMAX) 24 hr capsule 0.4 mg  0.4 mg Oral Daily Ryan Sequeira MD            ________________________________________________________        OBJECTIVE:  Upon today's exam    GEN: NAD, pleasant, cooperative  CHEST: No signs of resp distress, on room air    NEURO  MENTAL STATUS: AAOx3, memory intact, fund of knowledge appropriate  LANG/SPEECH: Naming and repetition intact, fluent, follows 3-step commands    CRANIAL NERVES:  II-XII grossly intact    MOTOR:  Motor strength 5/5 throughout, symmetric.     REFLEXES: 2/4 throughout    SENSORY:  Normal to touch, temp all limbs  No hemineglect, no extinction to double-sided stimulation (visual & tactile)  COORD: Normal finger to nose      NIH Stroke Scale  Interval: baseline  1a. Level of Consciousness: 0-->Alert,  keenly responsive  1b. LOC Questions: 0-->Answers both questions correctly  1c. LOC Commands: 0-->Performs both tasks correctly  2. Best Gaze: 0-->Normal  3. Visual: 0-->No visual loss  4. Facial Palsy: 1-->Minor paralysis (flattened nasolabial fold, asymmetry on smiling)  5a. Motor Arm, Left: 0-->No drift, limb holds 90 (or 45) degrees for full 10 secs  5b. Motor Arm, Right: 0-->No drift, limb holds 90 (or 45) degrees for full 10 secs  6a. Motor Leg, Left: 0-->No drift, leg holds 30 degree position for full 5 secs  6b. Motor Leg, Right: 0-->No drift, leg holds 30 degree position for full 5 secs  7. Limb Ataxia: 0-->Absent  8. Sensory: 0-->Normal, no sensory loss  9. Best Language: 0-->No aphasia, normal  10. Dysarthria: 0-->Normal  11. Extinction and Inattention (formerly Neglect): 0-->No abnormality  Total (NIH Stroke Scale): 1      Modified [place] Scale  Pre-Stroke Modified [place] Scale: 0 - No Symptoms at all.         ________________________________________________________   RESULTS REVIEW:    VITAL SIGNS:   Temp:  [97.3 °F (36.3 °C)-98.6 °F (37 °C)] 98 °F (36.7 °C)  Heart Rate:  [] 97  Resp:  [17-22] 22  BP: (110-174)/(62-91) 164/73     LABS:      Lab 02/23/25  0429 02/22/25  1037 02/20/25  2229   WBC 7.29 9.89 8.51   HEMOGLOBIN 8.5* 9.1* 8.7*   HEMATOCRIT 26.7* 28.2* 27.0*   PLATELETS 170 181 166   NEUTROS ABS 5.66 7.66* 7.45*   IMMATURE GRANS (ABS) 0.06* 0.04 0.02   LYMPHS ABS 0.71 0.84 0.55*   MONOS ABS 0.84 1.27* 0.48   EOS ABS 0.00 0.04 0.00   MCV 89.0 89.0 89.4         Lab 02/23/25  0429 02/22/25  1037 02/20/25  2229   SODIUM 136 136 136   POTASSIUM 4.5 4.8 4.9   CHLORIDE 102 104 104   CO2 20.8* 21.5* 21.4*   ANION GAP 13.2 10.5 10.6   BUN 39* 40* 40*   CREATININE 2.88* 3.09* 2.74*   EGFR 20.6* 18.9* 21.9*   GLUCOSE 168* 171* 368*   CALCIUM 8.7 8.8 9.0         Lab 02/20/25  2229   TOTAL PROTEIN 5.4*   ALBUMIN 3.4*   GLOBULIN 2.0   ALT (SGPT) 13   AST (SGOT) 17   BILIRUBIN <0.2   ALK PHOS 71          Lab 02/21/25  2309   PROBNP 7,753.0*             Lab 02/20/25  1014   ABO TYPING B   RH TYPING Negative   ANTIBODY SCREEN Negative         UA          2/10/2025    16:00   Urinalysis   Squamous Epithelial Cells, UA 0-2    Specific [place], UA 1.011    Ketones, UA Negative    Blood, UA Small (1+)    Leukocytes, UA Negative    Nitrite, UA Negative    RBC, UA 21-50    WBC, UA 6-10    Bacteria, UA None Seen        Lab Results   Component Value Date    HGBA1C 7.6 (H) 09/13/2024       IMAGING STUDIES:  CT Angiogram Neck    Result Date: 2/22/2025  Impression: 1.Severe densely calcified plaque at the right carotid bifurcation extending into the proximal ICA resulting in high-grade stenosis and possibly short segment occlusion. The degree of stenosis is certainly greater than 70%, but difficult to measure due to heavy calcification and some streak artifact in this area. 2.Severe calcified plaque at the left carotid bifurcation extending into the proximal ICA resulting in high-grade stenosis estimated to be 70%. 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 The left V4 segment reconstitutes via a PICA branch. 4.High-grade focal stenosis at the origin of the right subclavian artery estimated to be greater than 50%. 5.High-grade stenosis at the origin of the right common carotid artery estimated to be 70%. 6.High-grade stenosis at the origin of the left A1 segment. 7.Moderate grade stenosis in the mid right V4 segment. 8.Patchy airspace disease in the visualized upper lungs with small bilateral pleural effusions. This could reflect pulmonary edema or pneumonia. Electronically Signed: Serjio Troy MD  2/22/2025 9:22 PM EST  Workstation ID: [id number]    CT Angiogram Head w AI Analysis of LVO    Result Date: 2/22/2025  Impression: 1.Severe densely calcified plaque at the right carotid bifurcation extending into the proximal ICA resulting in high-grade stenosis and possibly short segment  occlusion. The degree of stenosis is certainly greater than 70%, but difficult to measure due to heavy calcification and some streak artifact in this area. 2.Severe calcified plaque at the left carotid bifurcation extending into the proximal ICA resulting in high-grade stenosis estimated to be 70%. 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 The left V4 segment reconstitutes via a PICA branch. 4.High-grade focal stenosis at the origin of the right subclavian artery estimated to be greater than 50%. 5.High-grade stenosis at the origin of the right common carotid artery estimated to be 70%. 6.High-grade stenosis at the origin of the left A1 segment. 7.Moderate grade stenosis in the mid right V4 segment. 8.Patchy airspace disease in the visualized upper lungs with small bilateral pleural effusions. This could reflect pulmonary edema or pneumonia. Electronically Signed: Serjio Troy MD  2/22/2025 9:22 PM EST  Workstation ID: [id number]    CT CEREBRAL PERFUSION WITH & WITHOUT CONTRAST    Result Date: 2/22/2025  Impression: 1. No definite core infarct or ischemic penumbra. 2. There is prolongation of Tmax in the right MCA distribution, which likely corresponds to perfusion abnormalities arising from high-grade right carotid stenosis seen on CTA. Electronically Signed: Serjio Troy MD  2/22/2025 8:37 PM EST  Workstation ID: [id number]    CT Head Without Contrast Stroke Protocol    Result Date: 2/22/2025  Impression: 1.No acute intracranial abnormality. 2.Mild chronic small vessel ischemic change. 3.Extensive intracranial atherosclerotic calcifications. Electronically Signed: Serjio Troy MD  2/22/2025 8:10 PM EST  Workstation ID: [id number]    CT Abdomen Pelvis Without Contrast    Result Date: 2/21/2025  Impression: 1.There is bladder wall thickening/indistinctness, concerning for cystitis. Small amount of air is seen within the bladder lumen which may be related to recent  instrumentation or infection with gas-forming organism. Please correlate with urinalysis. 2.There is mild left hydronephrosis without obstructing calculus or mass identified. This could represent ascending infection or recently passed calculus. 3.Bilateral renal hilar vascular calcifications. Additional nonobstructive nephrolithiasis cannot be entirely excluded. 4.Bilateral lung base opacities may represent edema or infiltrates. Small bilateral pleural effusions are partially imaged. 5.Additional findings as detailed above. Electronically Signed: Enrike Coleman MD  2/21/2025 4:35 PM EST  Workstation ID: [id number]     I reviewed the patient's new clinical results.    ________________________________________________________     PROBLEM LIST:    Bladder cancer    Bladder mass            ASSESSMENT/PLAN:    Acute left-sided weakness while inpatient POD #3 s/p TURBT  Dave Peguero is an 84-year-old male former smoker with a past medical history of bilateral ICA stenoses 40-50% per MRA 2019, chronic left vertebral artery occlusion, chronic left A1 stenoses, CKD 3 (creatinine 3.09), bladder tumor s/p resection 2/20/2025, CAD s/p CABG (2018), MI (2018), DMT2 (hemoglobin A1c 7.6%), hypertension, hyperlipidemia, neuropathy who presented to [facility] on 2/20/2025 for scheduled bladder resection who has remained inpatient due to complications of urinary retention.  TeleNeurology called at 7:49 PM due to acute onset left-sided weakness.  CT head negative for hemorrhage. The patient was last in his normal health on 2/22/2025 at 6:30 PM. Baseline mRS 0.  Patient was not a candidate for IV thrombolytics given recent severe bleeding secondary to bladder cancer as well as recent bladder resection 2/20/2025.  CT perfusion reveals a left-sided perfusion abnormality, favored to be artifact, as symptoms are on opposite side.  CTA H/N negative for LVO, but showed significant atherosclerotic changes intracranially and  extracranially.  Severe densely calcified plaques in the bilateral right carotid bifurcations measuring greater than 70%.  Chronic occlusion of the nondominant left vertebral artery.  High-grade focal stenosis of the origin of the right subclavian artery.  High-grade stenosis of the origin right common carotid artery greater than 70%.  High-grade stenosis of the origin of the left A1 segment.  Moderate grade stenosis of the mid right V4 segment.     Mild left facial droop and dysarthria   Rapidly improved left hemiparesis and ataxia.  Suspect small right sided infarct versus transient ischemic attack.     -MRI brain without contrast pending   -If patient is unable to obtain MRI (alert for metal implant), will diagnose stroke clinically if persistent symptoms.  Given the patient severe atherosclerotic disease including intracranial atherosclerotic disease, if/when cleared by the surgical team, recommend starting high-dose aspirin 325 mg daily and clopidogrel 75 mg daily for at least 90 days.  Furthermore I recommend the patient follow-up outpatient with vascular neurology after discharge.  -Recommend start aspirin 81 mg tonight; team will reach out to surgeon for clearance.  -Vascular surgery consulted for severe carotid disease and high-grade stenoses  -Increase atorvastatin to 40 mg daily LDL 84  -Permissive blood pressure as able for 24-48 hours  -Every 4 hour neurochecks  -Please stat page neurology with any change in neurologic status      Modification of stroke risk factors:   - Blood pressure should be less than 130/80 outpatient, HbA1c less than 6.5, LDL less than 70; b12>500 and smoking cessation if applicable. We would be grateful if the primary team / primary care physician would keep a close watch on the above targets.  - Stroke education  - Follow up with neurologist of choice      I discussed the patient's findings and my recommendations with patient, nursing staff, and primary care team    Burton  MD Kiet  02/23/25  12:07 EST

## 2025-02-24 ENCOUNTER — APPOINTMENT (OUTPATIENT)
Dept: CARDIOLOGY | Facility: HOSPITAL | Age: 85
DRG: 668 | End: 2025-02-24
Payer: MEDICARE

## 2025-02-24 ENCOUNTER — READMISSION MANAGEMENT (OUTPATIENT)
Dept: CALL CENTER | Facility: HOSPITAL | Age: 85
End: 2025-02-24
Payer: MEDICARE

## 2025-02-24 VITALS
TEMPERATURE: 97.6 F | RESPIRATION RATE: 20 BRPM | WEIGHT: 200.84 LBS | BODY MASS INDEX: 29.75 KG/M2 | DIASTOLIC BLOOD PRESSURE: 107 MMHG | OXYGEN SATURATION: 94 % | SYSTOLIC BLOOD PRESSURE: 141 MMHG | HEIGHT: 69 IN | HEART RATE: 82 BPM

## 2025-02-24 PROBLEM — I65.23 CAROTID STENOSIS, BILATERAL: Status: ACTIVE | Noted: 2025-02-24

## 2025-02-24 PROBLEM — N32.89 BLADDER MASS: Status: RESOLVED | Noted: 2025-02-22 | Resolved: 2025-02-24

## 2025-02-24 LAB
ANION GAP SERPL CALCULATED.3IONS-SCNC: 13.1 MMOL/L (ref 5–15)
BASOPHILS # BLD AUTO: 0.03 10*3/MM3 (ref 0–0.2)
BASOPHILS NFR BLD AUTO: 0.3 % (ref 0–1.5)
BH CV XLRA MEAS LEFT CAROTID BULB EDV: -51.7 CM/SEC
BH CV XLRA MEAS LEFT CAROTID BULB PSV: -182 CM/SEC
BH CV XLRA MEAS LEFT DIST CCA EDV: -22.7 CM/SEC
BH CV XLRA MEAS LEFT DIST CCA PSV: -97.2 CM/SEC
BH CV XLRA MEAS LEFT DIST ICA EDV: -32.1 CM/SEC
BH CV XLRA MEAS LEFT DIST ICA PSV: -120 CM/SEC
BH CV XLRA MEAS LEFT ICA/CCA RATIO: -2.07
BH CV XLRA MEAS LEFT PROX CCA EDV: 15.7 CM/SEC
BH CV XLRA MEAS LEFT PROX CCA PSV: 128 CM/SEC
BH CV XLRA MEAS LEFT PROX ECA PSV: -397 CM/SEC
BH CV XLRA MEAS LEFT PROX ICA EDV: -88.5 CM/SEC
BH CV XLRA MEAS LEFT PROX ICA PSV: -265 CM/SEC
BH CV XLRA MEAS LEFT PROX SCLA PSV: 152 CM/SEC
BH CV XLRA MEAS RIGHT CAROTID BULB EDV: -17.4 CM/SEC
BH CV XLRA MEAS RIGHT CAROTID BULB PSV: -90.1 CM/SEC
BH CV XLRA MEAS RIGHT DIST CCA EDV: 14.9 CM/SEC
BH CV XLRA MEAS RIGHT DIST CCA PSV: 99.6 CM/SEC
BH CV XLRA MEAS RIGHT DIST ICA EDV: -10.4 CM/SEC
BH CV XLRA MEAS RIGHT DIST ICA PSV: -55.5 CM/SEC
BH CV XLRA MEAS RIGHT ICA/CCA RATIO: -2.7
BH CV XLRA MEAS RIGHT PROX CCA EDV: 9.6 CM/SEC
BH CV XLRA MEAS RIGHT PROX CCA PSV: 92.6 CM/SEC
BH CV XLRA MEAS RIGHT PROX ECA PSV: -223 CM/SEC
BH CV XLRA MEAS RIGHT PROX ICA EDV: -72.9 CM/SEC
BH CV XLRA MEAS RIGHT PROX ICA PSV: -268 CM/SEC
BH CV XLRA MEAS RIGHT PROX SCLA PSV: 166 CM/SEC
BH CV XLRA MEAS RIGHT VERTEBRAL A EDV: -21 CM/SEC
BH CV XLRA MEAS RIGHT VERTEBRAL A PSV: -86.2 CM/SEC
BUN SERPL-MCNC: 39 MG/DL (ref 8–23)
BUN/CREAT SERPL: 14.7 (ref 7–25)
CALCIUM SPEC-SCNC: 8.8 MG/DL (ref 8.6–10.5)
CHLORIDE SERPL-SCNC: 104 MMOL/L (ref 98–107)
CHOLEST SERPL-MCNC: 134 MG/DL (ref 0–200)
CO2 SERPL-SCNC: 20.9 MMOL/L (ref 22–29)
CREAT SERPL-MCNC: 2.65 MG/DL (ref 0.76–1.27)
DEPRECATED RDW RBC AUTO: 42.1 FL (ref 37–54)
EGFRCR SERPLBLD CKD-EPI 2021: 22.8 ML/MIN/1.73
EOSINOPHIL # BLD AUTO: 0.15 10*3/MM3 (ref 0–0.4)
EOSINOPHIL NFR BLD AUTO: 1.6 % (ref 0.3–6.2)
ERYTHROCYTE [DISTWIDTH] IN BLOOD BY AUTOMATED COUNT: 12.9 % (ref 12.3–15.4)
GLUCOSE BLDC GLUCOMTR-MCNC: 244 MG/DL (ref 70–105)
GLUCOSE BLDC GLUCOMTR-MCNC: 268 MG/DL (ref 70–105)
GLUCOSE SERPL-MCNC: 155 MG/DL (ref 65–99)
HBA1C MFR BLD: 7.55 % (ref 4.8–5.6)
HCT VFR BLD AUTO: 27.1 % (ref 37.5–51)
HDLC SERPL-MCNC: 53 MG/DL (ref 40–60)
HGB BLD-MCNC: 8.8 G/DL (ref 13–17.7)
IMM GRANULOCYTES # BLD AUTO: 0.06 10*3/MM3 (ref 0–0.05)
IMM GRANULOCYTES NFR BLD AUTO: 0.6 % (ref 0–0.5)
LDLC SERPL CALC-MCNC: 60 MG/DL (ref 0–100)
LDLC/HDLC SERPL: 1.09 {RATIO}
LYMPHOCYTES # BLD AUTO: 0.89 10*3/MM3 (ref 0.7–3.1)
LYMPHOCYTES NFR BLD AUTO: 9.5 % (ref 19.6–45.3)
MCH RBC QN AUTO: 28.9 PG (ref 26.6–33)
MCHC RBC AUTO-ENTMCNC: 32.5 G/DL (ref 31.5–35.7)
MCV RBC AUTO: 88.9 FL (ref 79–97)
MONOCYTES # BLD AUTO: 1.03 10*3/MM3 (ref 0.1–0.9)
MONOCYTES NFR BLD AUTO: 11 % (ref 5–12)
NEUTROPHILS NFR BLD AUTO: 7.17 10*3/MM3 (ref 1.7–7)
NEUTROPHILS NFR BLD AUTO: 77 % (ref 42.7–76)
NRBC BLD AUTO-RTO: 0 /100 WBC (ref 0–0.2)
PLATELET # BLD AUTO: 178 10*3/MM3 (ref 140–450)
PMV BLD AUTO: 10.6 FL (ref 6–12)
POTASSIUM SERPL-SCNC: 4.5 MMOL/L (ref 3.5–5.2)
RBC # BLD AUTO: 3.05 10*6/MM3 (ref 4.14–5.8)
SODIUM SERPL-SCNC: 138 MMOL/L (ref 136–145)
TRIGL SERPL-MCNC: 117 MG/DL (ref 0–150)
TSH SERPL DL<=0.05 MIU/L-ACNC: 1.35 UIU/ML (ref 0.27–4.2)
VLDLC SERPL-MCNC: 21 MG/DL (ref 5–40)
WBC NRBC COR # BLD AUTO: 9.33 10*3/MM3 (ref 3.4–10.8)

## 2025-02-24 PROCEDURE — 80048 BASIC METABOLIC PNL TOTAL CA: CPT | Performed by: UROLOGY

## 2025-02-24 PROCEDURE — 83036 HEMOGLOBIN GLYCOSYLATED A1C: CPT | Performed by: NURSE PRACTITIONER

## 2025-02-24 PROCEDURE — 93880 EXTRACRANIAL BILAT STUDY: CPT | Performed by: STUDENT IN AN ORGANIZED HEALTH CARE EDUCATION/TRAINING PROGRAM

## 2025-02-24 PROCEDURE — 84443 ASSAY THYROID STIM HORMONE: CPT | Performed by: NURSE PRACTITIONER

## 2025-02-24 PROCEDURE — 85025 COMPLETE CBC W/AUTO DIFF WBC: CPT | Performed by: UROLOGY

## 2025-02-24 PROCEDURE — 99222 1ST HOSP IP/OBS MODERATE 55: CPT | Performed by: STUDENT IN AN ORGANIZED HEALTH CARE EDUCATION/TRAINING PROGRAM

## 2025-02-24 PROCEDURE — 63710000001 PREDNISONE PER 1 MG: Performed by: STUDENT IN AN ORGANIZED HEALTH CARE EDUCATION/TRAINING PROGRAM

## 2025-02-24 PROCEDURE — 80061 LIPID PANEL: CPT | Performed by: NURSE PRACTITIONER

## 2025-02-24 PROCEDURE — 93880 EXTRACRANIAL BILAT STUDY: CPT

## 2025-02-24 PROCEDURE — 63710000001 INSULIN LISPRO (HUMAN) PER 5 UNITS: Performed by: UROLOGY

## 2025-02-24 PROCEDURE — 82948 REAGENT STRIP/BLOOD GLUCOSE: CPT

## 2025-02-24 PROCEDURE — 92526 ORAL FUNCTION THERAPY: CPT

## 2025-02-24 PROCEDURE — 82948 REAGENT STRIP/BLOOD GLUCOSE: CPT | Performed by: UROLOGY

## 2025-02-24 PROCEDURE — 99232 SBSQ HOSP IP/OBS MODERATE 35: CPT | Performed by: NURSE PRACTITIONER

## 2025-02-24 RX ORDER — ASPIRIN 81 MG/1
81 TABLET ORAL DAILY
Status: DISCONTINUED | OUTPATIENT
Start: 2025-02-24 | End: 2025-02-24 | Stop reason: HOSPADM

## 2025-02-24 RX ORDER — CLOPIDOGREL BISULFATE 75 MG/1
75 TABLET ORAL DAILY
Qty: 30 TABLET | Refills: 5 | Status: SHIPPED | OUTPATIENT
Start: 2025-02-25

## 2025-02-24 RX ADMIN — INSULIN LISPRO 6 UNITS: 100 INJECTION, SOLUTION INTRAVENOUS; SUBCUTANEOUS at 13:05

## 2025-02-24 RX ADMIN — INSULIN LISPRO 4 UNITS: 100 INJECTION, SOLUTION INTRAVENOUS; SUBCUTANEOUS at 08:46

## 2025-02-24 RX ADMIN — SUCRALFATE 1 G: 1 TABLET ORAL at 08:00

## 2025-02-24 RX ADMIN — ASPIRIN 81 MG: 81 TABLET, COATED ORAL at 13:05

## 2025-02-24 RX ADMIN — CLOPIDOGREL BISULFATE 75 MG: 75 TABLET ORAL at 08:00

## 2025-02-24 RX ADMIN — PREDNISONE 20 MG: 20 TABLET ORAL at 08:00

## 2025-02-24 RX ADMIN — ACETAMINOPHEN 650 MG: 325 TABLET, FILM COATED ORAL at 08:09

## 2025-02-24 RX ADMIN — AMLODIPINE BESYLATE 5 MG: 5 TABLET ORAL at 08:00

## 2025-02-24 RX ADMIN — POLYETHYLENE GLYCOL 3350 34 G: 17 POWDER, FOR SOLUTION ORAL at 08:01

## 2025-02-24 RX ADMIN — CETIRIZINE HYDROCHLORIDE 10 MG: 10 TABLET, FILM COATED ORAL at 08:00

## 2025-02-24 RX ADMIN — PANTOPRAZOLE SODIUM 40 MG: 40 TABLET, DELAYED RELEASE ORAL at 08:00

## 2025-02-24 RX ADMIN — TAMSULOSIN HYDROCHLORIDE 0.4 MG: 0.4 CAPSULE ORAL at 08:00

## 2025-02-24 RX ADMIN — CARVEDILOL 12.5 MG: 6.25 TABLET, FILM COATED ORAL at 08:00

## 2025-02-24 RX ADMIN — NICOTINE 1 PATCH: 14 PATCH TRANSDERMAL at 08:00

## 2025-02-24 NOTE — PLAN OF CARE
Problem: Adult Inpatient Plan of Care  Goal: Absence of Hospital-Acquired Illness or Injury  Intervention: Identify and Manage Fall Risk  Recent Flowsheet Documentation  Taken 2/24/2025 0200 by Kaci Casey LPN  Safety Promotion/Fall Prevention:   clutter free environment maintained   room organization consistent   safety round/check completed  Taken 2/23/2025 2352 by Kaci Casey LPN  Safety Promotion/Fall Prevention:   clutter free environment maintained   room organization consistent   safety round/check completed  Taken 2/23/2025 2140 by Kaci Casey LPN  Safety Promotion/Fall Prevention:   clutter free environment maintained   room organization consistent   safety round/check completed  Taken 2/23/2025 2020 by Kaci Casey LPN  Safety Promotion/Fall Prevention:   clutter free environment maintained   room organization consistent   safety round/check completed  Intervention: Prevent Skin Injury  Recent Flowsheet Documentation  Taken 2/23/2025 2352 by Kaci Casey LPN  Body Position: position changed independently  Taken 2/23/2025 2020 by Kaci Casey LPN  Body Position:   neutral body alignment   neutral head position  Skin Protection:   incontinence pads utilized   transparent dressing maintained  Intervention: Prevent and Manage VTE (Venous Thromboembolism) Risk  Recent Flowsheet Documentation  Taken 2/23/2025 2020 by Kaci Casey LPN  VTE Prevention/Management:   bilateral   SCDs (sequential compression devices) on  Intervention: Prevent Infection  Recent Flowsheet Documentation  Taken 2/24/2025 0200 by Kaci Casey LPN  Infection Prevention:   environmental surveillance performed   rest/sleep promoted   single patient room provided  Taken 2/23/2025 2352 by Kaci Casey LPN  Infection Prevention:   environmental surveillance performed   hand hygiene promoted  Taken 2/23/2025 2140 by Kaci Casey LPN  Infection Prevention: environmental surveillance performed  Taken 2/23/2025 2020 by Kaci Casey  LPN  Infection Prevention:   environmental surveillance performed   rest/sleep promoted   single patient room provided  Goal: Optimal Comfort and Wellbeing  Intervention: Monitor Pain and Promote Comfort  Recent Flowsheet Documentation  Taken 2/23/2025 2352 by Kaci Casey LPN  Pain Management Interventions:   swaddled (infant)   quiet environment facilitated   relaxation techniques promoted  Taken 2/23/2025 2020 by Kaci Casey LPN  Pain Management Interventions:   quiet environment facilitated   relaxation techniques promoted  Intervention: Provide Person-Centered Care  Recent Flowsheet Documentation  Taken 2/23/2025 2352 by Kaci Casey LPN  Trust Relationship/Rapport:   care explained   choices provided   questions encouraged   questions answered  Taken 2/23/2025 2020 by Kaci Casey LPN  Trust Relationship/Rapport:   care explained   choices provided   questions encouraged   reassurance provided     Problem: Comorbidity Management  Goal: Blood Glucose Level Within Target Range  Intervention: Monitor and Manage Glycemia  Recent Flowsheet Documentation  Taken 2/24/2025 0200 by Kaci Casey LPN  Medication Review/Management: medications reviewed  Taken 2/23/2025 2352 by Kaci Casey LPN  Medication Review/Management: medications reviewed  Taken 2/23/2025 2140 by Kaci Casey LPN  Medication Review/Management: medications reviewed  Taken 2/23/2025 2020 by Kaci Casey LPN  Medication Review/Management: medications reviewed     Problem: Fall Injury Risk  Goal: Absence of Fall and Fall-Related Injury  Intervention: Identify and Manage Contributors  Recent Flowsheet Documentation  Taken 2/24/2025 0200 by Kaci Casey LPN  Medication Review/Management: medications reviewed  Taken 2/23/2025 2352 by Kaci Casey LPN  Medication Review/Management: medications reviewed  Taken 2/23/2025 2140 by Kaci Casey LPN  Medication Review/Management: medications reviewed  Taken 2/23/2025 2020 by Kaci Casey  LPN  Medication Review/Management: medications reviewed  Intervention: Promote Injury-Free Environment  Recent Flowsheet Documentation  Taken 2/24/2025 0200 by Kaci Casey LPN  Safety Promotion/Fall Prevention:   clutter free environment maintained   room organization consistent   safety round/check completed  Taken 2/23/2025 2352 by Kaci Casey LPN  Safety Promotion/Fall Prevention:   clutter free environment maintained   room organization consistent   safety round/check completed  Taken 2/23/2025 2140 by Kaci Casey LPN  Safety Promotion/Fall Prevention:   clutter free environment maintained   room organization consistent   safety round/check completed  Taken 2/23/2025 2020 by Kaci Casey LPN  Safety Promotion/Fall Prevention:   clutter free environment maintained   room organization consistent   safety round/check completed     Problem: Skin Injury Risk Increased  Goal: Skin Health and Integrity  Intervention: Optimize Skin Protection  Recent Flowsheet Documentation  Taken 2/23/2025 2352 by Kaci Casey LPN  Head of Bed (HOB) Positioning: HOB at 30 degrees  Taken 2/23/2025 2020 by Kaci Casey LPN  Activity Management:   ambulated in room   back to bed  Pressure Reduction Techniques: frequent weight shift encouraged  Head of Bed (HOB) Positioning: HOB at 30 degrees  Pressure Reduction Devices: pressure-redistributing mattress utilized  Skin Protection:   incontinence pads utilized   transparent dressing maintained   Goal Outcome Evaluation:

## 2025-02-24 NOTE — THERAPY TREATMENT NOTE
Acute Care - Speech Language Pathology Treatment Note     [facility]     Patient Name: Dave Peguero  : 1940  MRN: [id number]    Today's Date: 2025                   Admit Date: 2025       Visit Dx:      ICD-10-CM ICD-9-CM   1. TIA (transient ischemic attack)  G45.9 435.9   2. Bladder tumor  D49.4 239.4   3. 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     Atherosclerosis of native arteries of extremities with intermittent claudication, left leg     Cancer     breast cancer hx    Carotid artery stenosis     Cerebral vascular disease     CHF (congestive heart failure)     Coronary artery disease     Diabetes mellitus     type 1    Hyperlipidemia     Hypertension     Kidney stone     Myocardial infarction     x 3       Past Surgical History:   Procedure Laterality Date    CARDIAC CATHETERIZATION      CORONARY ARTERY BYPASS GRAFT      TRIPLE    DENTAL PROCEDURE      all teeth have been pulled    FEMORAL ARTERY STENT Bilateral     JOINT REPLACEMENT Right     knee    PROSTATE SURGERY      TRANSURETHRAL RESECTION OF BLADDER TUMOR N/A 2025    Procedure: CYSTOSCOPY TRANSURETHRAL RESECTION OF BLADDER TUMOR;  Surgeon: Kevin Rodriguez MD;  Location: [facility];  Service: Urology;  Laterality: N/A;     Skilled ST intervention conducted this date targeting dysphagia in the setting of bladder CA and possible CVA/TIA.  Pt alert and amenable to treatment.   Pt on 2 liters/min via nasal cannula during session. Pt currently prescribed a Mechanical Soft and Thin liquids diet at this time     Treatment narrative/results: The patient was  seen today for a meal assessment and for regular solid trials for possible diet upgrade. Upon entrance to the patient's room he was seated up at approximately a 90 degree angle. He had already eaten lunch, stating he ate it quickly, however he was amenable to po trials supplied by the clinician for purposes of re-evaluation and possible diet upgrade. He is alert and cooperative. The patient is verbal and presents with good intelligibility. No dysarthria appreciated. NIH is 1. He was able to self feed all trials provided today. He drink thin water via straw well. Adequate labial seal made, he is able to pull thin liquid effectively and presents with no anterior labial spillage. Vocal quality clear following all trials of water. He is able to hold a cracker and self feed this. He was noted to take small, appropriate bites, demonstrated functional oral transit and bolus control, no oral residue or anterior spillage, and no overt pharyngeal signs/symptoms of difficulty. The patient indicated he is eager to go home and denies difficulty with current diet.     SLP Recommendation and Plan:  It is recommended that the patient's diet be upgraded to regular consistency/thin liquid at this time.  He should be up at a full 90 degree angle for all meals/medications (preferably in a chair)  ST will follow up while in-house with a full meal assessment to insure safety and adequacy of recommended/upgraded diet, independent use of safe swallow compensations, and additional goals/recommendations as indicated.      EDUCATION    The patient has been educated in the following areas:     Discussed current diet and recommendations. Pt verbalized understanding and was in agreement with plan/recommendations.       Dysphagia (Swallowing Impairment) Modified Diet Instruction.             SLP GOALS       Row Name 02/24/25 1500       (LTG) Swallow    (LTG) Swallow Patient will tolerate safest and least restrictive diet without complications  from aspiration.  -SM    Robson (Swallow Long Term Goal) independently (over 90% accuracy)  -SM    Time Frame (Swallow Long Term Goal) by discharge  -SM    Progress/Outcomes (Swallow Long Term Goal) goal ongoing  -SM    Comment (Swallow Long Term Goal) See above  -SM       (STG) Swallow 1    (STG) Swallow 1 Patient will participate in meal assessment to assure safety and adequacy of diet and/or advancement of diet and independent use of safe swallow strategies.  -SM    Robson (Swallow Short Term Goal 1) with minimal cues (75-90% accuracy)  -SM    Time Frame (Swallow Short Term Goal 1) 1 week  -SM    Progress/Outcomes (Swallow Short Term Goal 1) goal ongoing  -SM    Comment (Swallow Short Term Goal 1) See above  -SM       (STG) Swallow 2    (STG) Swallow 2 The patient will participate in ongoing dysphagia assessment for possible diet upgrade, to include soft to chew and regular consistency trials. Patient and caregiver education  -SM    Robson (Swallow Short Term Goal 2) with minimal cues (75-90% accuracy)  -SM    Time Frame (Swallow Short Term Goal 2) by discharge  -SM    Progress/Outcomes (Swallow Short Term Goal 2) new goal  -SM    Comment (Swallow Short Term Goal 2) See above  -SM              User Key  (r) = Recorded By, (t) = Taken By, (c) = Cosigned By      Initials Name Provider Type    Rita Cohn, SLP Speech and Language Pathologist    Mercedes Schofield, SLP Speech and Language Pathologist                                Time Calculation:                                  SAGE Combs  2/24/2025

## 2025-02-24 NOTE — PROGRESS NOTES
Urology Progress Note    Patient Identification:  Name:  Dave Peguero  Age:  84 y.o.  Sex:  male  :  1940  MRN:  [id number]    Chief Complaint:  Patient feels better    History of Present Illness: Patient failed his voiding trial and Nolen catheter was replaced.  Patient feels neurologic symptoms have resolved.  Increasing activity.    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     Atherosclerosis of native arteries of extremities with intermittent claudication, left leg     Cancer     breast cancer hx    Carotid artery stenosis     Cerebral vascular disease     CHF (congestive heart failure)     Coronary artery disease     Diabetes mellitus     type 1    Hyperlipidemia     Hypertension     Kidney stone     Myocardial infarction     x 3     Past Surgical History:  Past Surgical History:   Procedure Laterality Date    CARDIAC CATHETERIZATION      CORONARY ARTERY BYPASS GRAFT      TRIPLE    DENTAL PROCEDURE      all teeth have been pulled    FEMORAL ARTERY STENT Bilateral     JOINT REPLACEMENT Right     knee    PROSTATE SURGERY      TRANSURETHRAL RESECTION OF BLADDER TUMOR N/A 2025    Procedure: CYSTOSCOPY TRANSURETHRAL RESECTION OF BLADDER TUMOR;  Surgeon: Kevin Rodriguez MD;  Location: [facility];  Service: Urology;  Laterality: N/A;     Home Meds:  Medications Prior to Admission   Medication Sig Dispense Refill Last Dose/Taking    amLODIPine (NORVASC) 5 MG tablet Take 1 tablet by mouth 2 (Two) Times a Day.   Taking    aspirin 81 MG EC tablet Take 1 tablet by mouth Daily.   Taking    atorvastatin (LIPITOR) 20 MG tablet Take 1 tablet by mouth Every Evening.   Taking    calcium carbonate (OS-RUTH) 600 MG tablet Take 1 tablet by mouth 2 (Two) Times a Day With Meals.   Taking    carvedilol (COREG) 25 MG tablet  Take 1 tablet by mouth 2 (Two) Times a Day With Meals. 60 tablet 1 Taking    cetirizine (zyrTEC) 10 MG tablet Take 1 tablet by mouth Daily. 90 tablet 1 Taking    clopidogrel (PLAVIX) 75 MG tablet Take 1 tablet by mouth Daily.   Taking    hydrALAZINE (APRESOLINE) 10 MG tablet Take 1 tablet by mouth 3 (Three) Times a Day. 270 tablet 0 Taking    isosorbide mononitrate (IMDUR) 30 MG 24 hr tablet Take 1 tablet by mouth Daily. 30 tablet 1 Taking    minoxidil (LONITEN) 2.5 MG tablet Take 1 tablet by mouth Daily. 30 tablet 0 Taking    pantoprazole (PROTONIX) 40 MG EC tablet Take 1 tablet by mouth Daily.   Taking    sucralfate (CARAFATE) 1 g tablet Take 1 tablet by mouth Every Other Day.   Taking     Current Meds:    Current Facility-Administered Medications:     acetaminophen (TYLENOL) tablet 650 mg, 650 mg, Oral, Q4H PRN, 650 mg at 02/22/25 1735 **OR** acetaminophen (TYLENOL) suppository 650 mg, 650 mg, Rectal, Q4H PRN, Kevin Rodriguez MD    [Held by provider] amLODIPine (NORVASC) tablet 5 mg, 5 mg, Oral, BID, Kevin Rodriguez MD, 5 mg at 02/23/25 0825    atorvastatin (LIPITOR) tablet 40 mg, 40 mg, Oral, Nightly, Daisy Capellan MD, 40 mg at 02/23/25 2051    carvedilol (COREG) tablet 12.5 mg, 12.5 mg, Oral, BID With Meals, Ryan Milton MD, 12.5 mg at 02/23/25 1731    cetirizine (zyrTEC) tablet 10 mg, 10 mg, Oral, Daily, Kevin Rodriguez MD, 10 mg at 02/23/25 0825    clopidogrel (PLAVIX) tablet 75 mg, 75 mg, Oral, Daily, Ryan Milton MD    dextrose (D50W) (25 g/50 mL) IV injection 25 g, 25 g, Intravenous, Q15 Min PRN, Naseem Kwok MD    dextrose (GLUTOSE) oral gel 15 g, 15 g, Oral, Q15 Min PRN, Naseem Kwok MD    glucagon (GLUCAGEN) injection 1 mg, 1 mg, Intramuscular, Q15 Min PRN, Naseem Kwok MD    [Held by provider] hydrALAZINE (APRESOLINE) tablet 10 mg, 10 mg, Oral, TID, Kvein Rodriguez MD, 10 mg at 02/23/25 0825    HYDROcodone-acetaminophen (NORCO)  MG per  tablet 1 tablet, 1 tablet, Oral, Q4H PRN, Juan Manuel Carmen MD, 1 tablet at 25 1412    insulin lispro (HUMALOG/ADMELOG) injection 2-9 Units, 2-9 Units, Subcutaneous, 4x Daily AC & at Bedtime, Kevin Rodriguez MD, 6 Units at 25 210    ipratropium-albuterol (DUO-NEB) nebulizer solution 3 mL, 3 mL, Nebulization, Q4H PRN, Salome Villalta, APRN, 3 mL at 25 1452    [Held by provider] isosorbide mononitrate (IMDUR) 24 hr tablet 30 mg, 30 mg, Oral, Q24H, Kevin Rodriguez MD, 30 mg at 25 0826    [Held by provider] minoxidil (LONITEN) tablet 2.5 mg, 2.5 mg, Oral, Q24H, Kevin Rodriguez MD, 2.5 mg at 25 08    nicotine (NICODERM CQ) 14 MG/24HR patch 1 patch, 1 patch, Transdermal, Q24H, Juan Manuel Carmen MD, 1 patch at 25 1234    ondansetron ODT (ZOFRAN-ODT) disintegrating tablet 4 mg, 4 mg, Oral, Q6H PRN, 4 mg at 25 1214 **OR** ondansetron (ZOFRAN) injection 4 mg, 4 mg, Intravenous, Q6H PRN, Kevin oRdriguez MD    pantoprazole (PROTONIX) EC tablet 40 mg, 40 mg, Oral, Daily, Kevin Rodriguez MD, 40 mg at 25 0826    polyethylene glycol (MIRALAX) packet 34 g, 34 g, Oral, Daily, Juan Manuel Carmen MD, 34 g at 25 08    predniSONE (DELTASONE) tablet 20 mg, 20 mg, Oral, Daily, Juan Manuel Carmen MD, 20 mg at 25 1411    sucralfate (CARAFATE) tablet 1 g, 1 g, Oral, Every Other Day, Kevin Rodriguez MD, 1 g at 25 0826    tamsulosin (FLOMAX) 24 hr capsule 0.4 mg, 0.4 mg, Oral, Daily, Ryan Sequeira MD  Allergies:  Benadryl [diphenhydramine]    Review of Systems     Objective:  tMax 24 hours:  Temp (24hrs), Av °F (36.7 °C), Min:97.7 °F (36.5 °C), Max:98.3 °F (36.8 °C)    Vital Sign Ranges:  Temp:  [97.7 °F (36.5 °C)-98.3 °F (36.8 °C)] 97.7 °F (36.5 °C)  Heart Rate:  [] 81  Resp:  [16-22] 20  BP: (156-179)/(59-82) 179/70  Intake and Output Last 3 Shifts:  I/O last 3 completed shifts:  In: 240 [P.O.:240]  Out: 2125 [Urine:2125]    Exam:  /70 (BP  "Location: Left arm, Patient Position: Lying)   Pulse 81   Temp 97.7 °F (36.5 °C) (Oral)   Resp 20   Ht 175.3 cm (69\")   Wt 91.1 kg (200 lb 13.4 oz)   SpO2 91%   BMI 29.66 kg/m²    General Appearance:    Alert, cooperative, no acute distress, general         appearance is normal   Head:    Normocephalic, without obvious abnormality, atraumatic   Eyes:            Pupils/Irises normal. Exterior inspection conjunctivae       and lids normal.   Ears:    Normal external inspection   Nose:   Exterior inspection of nose is normal   Throat:   Lips, mucosa, and tongue normal   Lungs:     Respirations unlabored; normal effort, no audible     abnormality   CV:   Regular rhythm and normal rate, no edema   Abdomen:     examination of the abdomen is normal with     no masses, tenderness, or distension    : Nolen with clear urine     Data Review:  All labs (24hrs):    Lab Results (last 24 hours)       Procedure Component Value Units Date/Time    Basic Metabolic Panel [[id number]]  (Abnormal) Collected: 02/24/25 0529    Specimen: Blood Updated: 02/24/25 0623     Glucose 155 mg/dL      BUN 39 mg/dL      Creatinine 2.65 mg/dL      Sodium 138 mmol/L      Potassium 4.5 mmol/L      Chloride 104 mmol/L      CO2 20.9 mmol/L      Calcium 8.8 mg/dL      BUN/Creatinine Ratio 14.7     Anion Gap 13.1 mmol/L      eGFR 22.8 mL/min/1.73     Narrative:      GFR Categories in Chronic Kidney Disease (CKD)      GFR Category          GFR (mL/min/1.73)    Interpretation  G1                     90 or greater         Normal or high (1)  G2                      60-89                Mild decrease (1)  G3a                   45-59                Mild to moderate decrease  G3b                   30-44                Moderate to severe decrease  G4                    15-29                Severe decrease  G5                    14 or less           Kidney failure          (1)In the absence of evidence of kidney disease, neither GFR category G1 or G2 " fulfill the criteria for CKD.    eGFR calculation 2021 CKD-EPI creatinine equation, which does not include race as a factor    CBC & Differential [[id number]]  (Abnormal) Collected: 02/24/25 0529    Specimen: Blood Updated: 02/24/25 0555    Narrative:      The following orders were created for panel order CBC & Differential.  Procedure                               Abnormality         Status                     ---------                               -----------         ------                     CBC Auto Differential[[id number]]        Abnormal            Final result                 Please view results for these tests on the individual orders.    CBC Auto Differential [[id number]]  (Abnormal) Collected: 02/24/25 0529    Specimen: Blood Updated: 02/24/25 0555     WBC 9.33 10*3/mm3      RBC 3.05 10*6/mm3      Hemoglobin 8.8 g/dL      Hematocrit 27.1 %      MCV 88.9 fL      MCH 28.9 pg      MCHC 32.5 g/dL      RDW 12.9 %      RDW-SD 42.1 fl      MPV 10.6 fL      Platelets 178 10*3/mm3      Neutrophil % 77.0 %      Lymphocyte % 9.5 %      Monocyte % 11.0 %      Eosinophil % 1.6 %      Basophil % 0.3 %      Immature Grans % 0.6 %      Neutrophils, Absolute 7.17 10*3/mm3      Lymphocytes, Absolute 0.89 10*3/mm3      Monocytes, Absolute 1.03 10*3/mm3      Eosinophils, Absolute 0.15 10*3/mm3      Basophils, Absolute 0.03 10*3/mm3      Immature Grans, Absolute 0.06 10*3/mm3      nRBC 0.0 /100 WBC     POC Glucose Once [[id number]]  (Abnormal) Collected: 02/23/25 2058    Specimen: Blood Updated: 02/23/25 2100     Glucose 279 mg/dL      Comment: Serial Number: [id number]:  [id number]       POC Glucose Once [[id number]]  (Abnormal) Collected: 02/23/25 1633    Specimen: Blood Updated: 02/23/25 1639     Glucose 196 mg/dL      Comment: Serial Number: [id number]:  [id number]       Microalbumin / Creatinine Urine Ratio - Urine, Clean Catch [[id number]]  (Abnormal) Collected: 02/22/25 2242    Specimen: Urine, Clean Catch  Updated: 02/23/25 1313     Microalbumin/Creatinine Ratio 1,506.9 mg/g      Creatinine, Urine 28.8 mg/dL      Microalbumin, Urine 43.4 mg/dL     Protein / Creatinine Ratio, Urine - Urine, Clean Catch [[id number]]  (Abnormal) Collected: 02/22/25 2242    Specimen: Urine, Clean Catch Updated: 02/23/25 1302     Protein/Creatinine Ratio, Urine 2,446.0 mg/G Crea      Creatinine, Urine 28.7 mg/dL      Total Protein, Urine 70.2 mg/dL     POC Glucose 4x Daily Before Meals & at Bedtime [[id number]]  (Abnormal) Collected: 02/23/25 1120    Specimen: Blood Updated: 02/23/25 1142     Glucose 191 mg/dL      Comment: Serial Number: [id number]:  [id number]       POC Glucose 4x Daily Before Meals & at Bedtime [[id number]]  (Abnormal) Collected: 02/23/25 0800    Specimen: Blood Updated: 02/23/25 0803     Glucose 159 mg/dL      Comment: Serial Number: [id number]:  [id number]             Radiology:   Imaging Results (Last 72 Hours)       Procedure Component Value Units Date/Time    CT Angiogram Neck [[id number]] Collected: 02/22/25 2111     Updated: 02/22/25 2124    Narrative:      CT ANGIOGRAM HEAD W AI ANALYSIS OF LVO  CT ANGIOGRAM NECK    Date of Exam: 2/22/2025 8:17 PM EST    Indication: stroke, code stroke.    Comparison: MR angiogram 1/17/2019.    Technique: CTA of the head and neck was performed after the uneventful intravenous administration of iodinated contrast. Reconstructed coronal and sagittal images were also obtained. In addition, a 3-D volume rendered image was created for   interpretation. Automated exposure control and iterative reconstruction methods were used.      Findings:  Aorta: There is mild atherosclerotic plaque in the visualized thoracic aorta. There is conventional three-vessel arch anatomy. There is mild plaque at the origins of the aortic branch vessels. The left subclavian artery is widely patent. There is a   high-grade focal stenosis at the origin of the right subclavian artery estimated to be  greater than 50%. The distal right subclavian artery is widely patent.    Right carotid: There is a high-grade focal stenosis at the origin of the right common carotid artery estimated to be 70%. There is mild nonstenosing plaque in the mid and distal CCA. There is severe densely calcified plaque at the carotid bifurcation   extending into the proximal ICA resulting in high-grade stenosis and possibly short segment occlusion. The degree of stenosis is certainly greater than 70%, but difficult to measure due to heavy calcification and some streak artifact in this area. There   is otherwise mild plaque in the mid and distal cervical ICA. ECA and distal branches are patent. There is mildly stenosing segmental calcified plaque in the intracranial ICA segments. There is a large patent P-comm. There is a large patent A-Comm. The A1   segment and distal ARELIS branches appear patent. The right M1 segment and distal MCA branches appear patent.    Left carotid: The left CCA demonstrates mild nonstenosing plaque. There is severe calcified plaque at the carotid bifurcation extending into the proximal ICA. There is high-grade stenosis at the origin of the cervical ICA estimated to be 70%. ECA and   distal branches are patent. Mid and distal cervical ICA are patent. There is mildly stenosing segmental calcific plaque present throughout the intracranial ICA segments. No definite P-comm. There is a high-grade stenosis at the origin of the A1 segment.   Distal ARELIS branches are patent. The M1 segment and distal MCA branches appear patent.    Posterior circulation: There is right vertebral artery dominance. There is mild narrowing at the proximal right vertebral artery. The remainder of the right vertebral artery is widely patent throughout the neck. 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 The left V4 segment reconstitutes via a PICA branch. There is a focal moderate grade stenosis in  the mid right V4 segment due to a circumferential calcified plaque. There is mild narrowing of the   basilar artery. Superior cerebellar arteries are patent. There is a hypoplastic right P1 segment due to fetal origin PCA on the right. The left P1 segment is normal. Distal PCA branches appear patent with mild atherosclerotic irregularities.    Nonvascular findings: There is chronic small vessel ischemic change in the brain. Orbits are symmetric. The paranasal sinuses and left mastoid air cells appear well aerated. There is a right mastoid effusion. Salivary glands appear symmetric. No evidence   of a neck mass or lymphadenopathy. Thyroid gland is homogeneous. There is patchy airspace disease in the visualized upper lungs, which appears dependent and could reflect pulmonary edema or pneumonia. There are small bilateral pleural effusions   partially seen. No acute osseous abnormality or destructive bone lesion. There is mild cervical spondylosis.      Impression:      Impression:  1.Severe densely calcified plaque at the right carotid bifurcation extending into the proximal ICA resulting in high-grade stenosis and possibly short segment occlusion. The degree of stenosis is certainly greater than 70%, but difficult to measure due   to heavy calcification and some streak artifact in this area.  2.Severe calcified plaque at the left carotid bifurcation extending into the proximal ICA resulting in high-grade stenosis estimated to be 70%.  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 The left V4 segment reconstitutes via a PICA branch.  4.High-grade focal stenosis at the origin of the right subclavian artery estimated to be greater than 50%.  5.High-grade stenosis at the origin of the right common carotid artery estimated to be 70%.  6.High-grade stenosis at the origin of the left A1 segment.  7.Moderate grade stenosis in the mid right V4 segment.  8.Patchy airspace disease in the  visualized upper lungs with small bilateral pleural effusions. This could reflect pulmonary edema or pneumonia.            Electronically Signed: Serjio Troy MD    2/22/2025 9:22 PM EST    Workstation ID: [id number]    CT Angiogram Head w AI Analysis of LVO [[id number]] Collected: 02/22/25 2111     Updated: 02/22/25 2124    Narrative:      CT ANGIOGRAM HEAD W AI ANALYSIS OF LVO  CT ANGIOGRAM NECK    Date of Exam: 2/22/2025 8:17 PM EST    Indication: stroke, code stroke.    Comparison: MR angiogram 1/17/2019.    Technique: CTA of the head and neck was performed after the uneventful intravenous administration of iodinated contrast. Reconstructed coronal and sagittal images were also obtained. In addition, a 3-D volume rendered image was created for   interpretation. Automated exposure control and iterative reconstruction methods were used.      Findings:  Aorta: There is mild atherosclerotic plaque in the visualized thoracic aorta. There is conventional three-vessel arch anatomy. There is mild plaque at the origins of the aortic branch vessels. The left subclavian artery is widely patent. There is a   high-grade focal stenosis at the origin of the right subclavian artery estimated to be greater than 50%. The distal right subclavian artery is widely patent.    Right carotid: There is a high-grade focal stenosis at the origin of the right common carotid artery estimated to be 70%. There is mild nonstenosing plaque in the mid and distal CCA. There is severe densely calcified plaque at the carotid bifurcation   extending into the proximal ICA resulting in high-grade stenosis and possibly short segment occlusion. The degree of stenosis is certainly greater than 70%, but difficult to measure due to heavy calcification and some streak artifact in this area. There   is otherwise mild plaque in the mid and distal cervical ICA. ECA and distal branches are patent. There is mildly stenosing segmental calcified plaque in the  intracranial ICA segments. There is a large patent P-comm. There is a large patent A-Comm. The A1   segment and distal ARELIS branches appear patent. The right M1 segment and distal MCA branches appear patent.    Left carotid: The left CCA demonstrates mild nonstenosing plaque. There is severe calcified plaque at the carotid bifurcation extending into the proximal ICA. There is high-grade stenosis at the origin of the cervical ICA estimated to be 70%. ECA and   distal branches are patent. Mid and distal cervical ICA are patent. There is mildly stenosing segmental calcific plaque present throughout the intracranial ICA segments. No definite P-comm. There is a high-grade stenosis at the origin of the A1 segment.   Distal ARELIS branches are patent. The M1 segment and distal MCA branches appear patent.    Posterior circulation: There is right vertebral artery dominance. There is mild narrowing at the proximal right vertebral artery. The remainder of the right vertebral artery is widely patent throughout the neck. 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 The left V4 segment reconstitutes via a PICA branch. There is a focal moderate grade stenosis in the mid right V4 segment due to a circumferential calcified plaque. There is mild narrowing of the   basilar artery. Superior cerebellar arteries are patent. There is a hypoplastic right P1 segment due to fetal origin PCA on the right. The left P1 segment is normal. Distal PCA branches appear patent with mild atherosclerotic irregularities.    Nonvascular findings: There is chronic small vessel ischemic change in the brain. Orbits are symmetric. The paranasal sinuses and left mastoid air cells appear well aerated. There is a right mastoid effusion. Salivary glands appear symmetric. No evidence   of a neck mass or lymphadenopathy. Thyroid gland is homogeneous. There is patchy airspace disease in the visualized upper lungs, which  appears dependent and could reflect pulmonary edema or pneumonia. There are small bilateral pleural effusions   partially seen. No acute osseous abnormality or destructive bone lesion. There is mild cervical spondylosis.      Impression:      Impression:  1.Severe densely calcified plaque at the right carotid bifurcation extending into the proximal ICA resulting in high-grade stenosis and possibly short segment occlusion. The degree of stenosis is certainly greater than 70%, but difficult to measure due   to heavy calcification and some streak artifact in this area.  2.Severe calcified plaque at the left carotid bifurcation extending into the proximal ICA resulting in high-grade stenosis estimated to be 70%.  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 The left V4 segment reconstitutes via a PICA branch.  4.High-grade focal stenosis at the origin of the right subclavian artery estimated to be greater than 50%.  5.High-grade stenosis at the origin of the right common carotid artery estimated to be 70%.  6.High-grade stenosis at the origin of the left A1 segment.  7.Moderate grade stenosis in the mid right V4 segment.  8.Patchy airspace disease in the visualized upper lungs with small bilateral pleural effusions. This could reflect pulmonary edema or pneumonia.            Electronically Signed: Serjio Troy MD    2/22/2025 9:22 PM EST    Workstation ID: [id number]    CT CEREBRAL PERFUSION WITH & WITHOUT CONTRAST [[id number]] Collected: 02/22/25 2034     Updated: 02/22/25 2039    Narrative:      CT CEREBRAL PERFUSION W WO CONTRAST    Date of Exam: 2/22/2025 8:14 PM EST    Indication: Neuro deficit, acute, stroke suspected stroke.     Comparison: None available.    Technique: Axial CT images of the brain were obtained prior to and after the administration of iodinated contrast. CT Perfusion protocol was utilized. Automated post processing was performed by RAPID software and submitted  to PACS for interpretation.   Automated exposure control and iterative reconstruction was utilized.      Findings:  Cerebral blood flow maps appears normal. There is a small area of diminished cerebral blood volume in the lateral left parietal lobe of uncertain clinical significance. There are prominent Tmax abnormalities with prolongation in the right cerebral   hemisphere, which could be related to ischemia and could certainly be due to high-grade stenosis in the right carotid artery as seen on CT angiogram. There is no obvious core infarct or ischemic penumbra.    CBF<30% volume: 0 mL  Tmax>6sec volume: 4 mL  Mismatch volume: 4 mL  Mismatch ratio: Infinite      Impression:      Impression:    1. No definite core infarct or ischemic penumbra.  2. There is prolongation of Tmax in the right MCA distribution, which likely corresponds to perfusion abnormalities arising from high-grade right carotid stenosis seen on CTA.        Electronically Signed: Serjio Troy MD    2/22/2025 8:37 PM EST    Workstation ID: [id number]    CT Head Without Contrast Stroke Protocol [[id number]] Collected: 02/22/25 2005     Updated: 02/22/25 2012    Narrative:      CT HEAD WO CONTRAST STROKE PROTOCOL    Date of Exam: 2/22/2025 8:00 PM EST    Indication: Neuro deficit, acute, stroke suspected stroke.    Comparison: Brain MRI/MRA 1/17/2018.    Technique: Axial CT images were obtained of the head without contrast administration.  Reconstructed coronal and sagittal images were also obtained. Automated exposure control and iterative construction methods were used.    Scan Time: 2003 hours  Results discussed with nursing staff on the floor at 2009 hours       Findings:  Superficial soft tissues appear within normal limits. The calvarium is intact.  Paranasal sinuses and mastoid air cells appear well aerated. Orbits appear symmetric. There is no acute intracranial hemorrhage.  No mass effect or midline shift.  No   abnormal extra-axial  collections.  Gray-white differentiation is within normal limits. There is mild patchy white matter hypoattenuation. The ventricles appear normal in size and configuration for the patient's age. There are extensive intracranial   atherosclerotic calcifications.      Impression:      Impression:  1.No acute intracranial abnormality.  2.Mild chronic small vessel ischemic change.  3.Extensive intracranial atherosclerotic calcifications.            Electronically Signed: Serjio Troy MD    2/22/2025 8:10 PM EST    Workstation ID: [id number]    XR Chest 1 View [[id number]] Resulted: 02/22/25 0119     Updated: 02/22/25 0120    CT Abdomen Pelvis Without Contrast [[id number]] Collected: 02/21/25 1629     Updated: 02/21/25 1637    Narrative:      CT ABDOMEN PELVIS WO CONTRAST    Date of Exam: 2/21/2025 4:26 PM EST    Indication: Abdominal pain.    Comparison: None available.    Technique: Axial CT images were obtained of the abdomen and pelvis without the administration of contrast. Sagittal and coronal reconstructions were performed.  Automated exposure control and iterative reconstruction methods were used.      Findings:    Liver: The liver is unremarkable in morphology. Evaluation for focal liver lesions is limited without IV contrast. No biliary dilation is seen.    Gallbladder: Unremarkable.    Pancreas: Unremarkable.    Spleen: Multiple splenic granulomas.    Adrenal glands: Unremarkable.    Genitourinary tract: There is bladder wall thickening/indistinctness, concerning for cystitis. Small amount of air is seen within the bladder lumen which may be related to recent instrumentation or infection with gas-forming organism. There is mild left   hydronephrosis without obstructing calculus or mass identified. This could represent ascending infection or recently passed calculus. There are bilateral renal hilar vascular calcifications. Additional nonobstructing calculi measuring up to 5 mm cannot   be excluded. Right ureter  is decompressed. Prostate gland is unremarkable.    Gastrointestinal tract: Limited evaluation of the hollow viscera due to lack of IV contrast administration. Colonic diverticulosis. Small hiatal hernia. No evidence of bowel obstruction    Appendix: No findings to suggest acute appendicitis.    Other findings: No free air or free fluid is identified. No pathologically enlarged lymph nodes are seen. Vascular calcifications are present.    Bones and soft tissues: No acute or suspicious osseous or soft tissue lesion is identified. Fat-containing umbilical hernia.    Lung bases: Bilateral lower lobe opacities with interlobular septal thickening in the left lower lobe and small bilateral pleural effusions      Impression:      Impression:  1.There is bladder wall thickening/indistinctness, concerning for cystitis. Small amount of air is seen within the bladder lumen which may be related to recent instrumentation or infection with gas-forming organism. Please correlate with urinalysis.  2.There is mild left hydronephrosis without obstructing calculus or mass identified. This could represent ascending infection or recently passed calculus.  3.Bilateral renal hilar vascular calcifications. Additional nonobstructive nephrolithiasis cannot be entirely excluded.  4.Bilateral lung base opacities may represent edema or infiltrates. Small bilateral pleural effusions are partially imaged.  5.Additional findings as detailed above.        Electronically Signed: Enrike Coleman MD    2/21/2025 4:35 PM EST    Workstation ID: [id number]            Assessment/Plan:    Principal Problem:    Bladder cancer  Active Problems:    Bladder mass    Bladder cancer status post TURBT  Mild left hydronephrosis likely due to edema from the resection  Left flank plain - resolved  Urinary retention.  Prostate has been resected in the past and appeared fairly wide open on cystoscopy  Postop TIA.  Anticoagulation has been restarted    Plan  Continue  Flomax  Voiding trial tomorrow morning if medically stable  Hopefully home in the next 1 to 2 days when deemed appropriate by the hospitalist and consultants      Kevin Rodriguez MD  2/24/2025  07:33 EST

## 2025-02-24 NOTE — PROGRESS NOTES
LOS: 2 days     Chief Complaint: Weakness, dysarthria and ataxia       SUBJECTIVE:    History taken from: patient chart RN    Interval History: Dysarthria, left-sided weakness and ataxia resolved.  Still has mild left facial asymmetry.  Patient wants to go home.  Refusing MRI due to severe claustrophobia.         Review of Systems   Constitutional: Negative.    Eyes:  Negative for visual disturbance.   Respiratory:  Positive for cough and shortness of breath.    Cardiovascular: Negative.    Neurological:  Negative for dizziness, tremors, seizures, syncope, facial asymmetry, speech difficulty, weakness, light-headedness, numbness and headaches.        Pertinent PMH:  has a past medical history of Arthritis, Atherosclerosis of autologous vein bypass graft of right lower extremity with intermittent claudication, Atherosclerosis of native arteries of extremities with intermittent claudication, left leg, Cancer, Carotid artery stenosis, Cerebral vascular disease, CHF (congestive heart failure), Coronary artery disease, Diabetes mellitus, Hyperlipidemia, Hypertension, Kidney stone, and Myocardial infarction.   ________________________________________________     OBJECTIVE:    Exam  GENERAL: NAD  CARDIO: RRR  NEURO:  Shortness of breath with speech, cough  Oriented x3  EOMI, PERRL, no visual field deficits  Left facial asymmetry  Speech clear without dysarthria  Sensations intact and equal bilaterally  Strength 5/5 and equal in all extremities  No ataxia    ________________________________________________   RESULTS REVIEW    VITAL SIGNS:  Temp:  [97.6 °F (36.4 °C)-98.1 °F (36.7 °C)] 97.6 °F (36.4 °C)  Heart Rate:  [70-97] 82  Resp:  [16-22] 20  BP: (141-190)/() 141/107    LABS:       Lab 02/24/25  0529 02/23/25  0429 02/22/25  1037 02/20/25  2229   WBC 9.33 7.29 9.89 8.51   HEMOGLOBIN 8.8* 8.5* 9.1* 8.7*   HEMATOCRIT 27.1* 26.7* 28.2* 27.0*   PLATELETS 178 170 181 166   NEUTROS ABS 7.17* 5.66 7.66* 7.45*    IMMATURE GRANS (ABS) 0.06* 0.06* 0.04 0.02   LYMPHS ABS 0.89 0.71 0.84 0.55*   MONOS ABS 1.03* 0.84 1.27* 0.48   EOS ABS 0.15 0.00 0.04 0.00   MCV 88.9 89.0 89.0 89.4         Lab 02/24/25  0529 02/23/25  0429 02/22/25  1037 02/20/25  2229   SODIUM 138 136 136 136   POTASSIUM 4.5 4.5 4.8 4.9   CHLORIDE 104 102 104 104   CO2 20.9* 20.8* 21.5* 21.4*   ANION GAP 13.1 13.2 10.5 10.6   BUN 39* 39* 40* 40*   CREATININE 2.65* 2.88* 3.09* 2.74*   EGFR 22.8* 20.6* 18.9* 21.9*   GLUCOSE 155* 168* 171* 368*   CALCIUM 8.8 8.7 8.8 9.0         Lab 02/20/25  2229   TOTAL PROTEIN 5.4*   ALBUMIN 3.4*   GLOBULIN 2.0   ALT (SGPT) 13   AST (SGOT) 17   BILIRUBIN <0.2   ALK PHOS 71         Lab 02/21/25  2309   PROBNP 7,753.0*             Lab 02/20/25  1014   ABO TYPING B   RH TYPING Negative   ANTIBODY SCREEN Negative         UA          2/10/2025    16:00   Urinalysis   Squamous Epithelial Cells, UA 0-2    Specific [place], UA 1.011    Ketones, UA Negative    Blood, UA Small (1+)    Leukocytes, UA Negative    Nitrite, UA Negative    RBC, UA 21-50    WBC, UA 6-10    Bacteria, UA None Seen        Lab Results   Component Value Date    HGBA1C 7.6 (H) 09/13/2024         IMAGING STUDIES:  CT Angiogram Neck    Result Date: 2/22/2025  Impression: 1.Severe densely calcified plaque at the right carotid bifurcation extending into the proximal ICA resulting in high-grade stenosis and possibly short segment occlusion. The degree of stenosis is certainly greater than 70%, but difficult to measure due to heavy calcification and some streak artifact in this area. 2.Severe calcified plaque at the left carotid bifurcation extending into the proximal ICA resulting in high-grade stenosis estimated to be 70%. 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 The left V4 segment reconstitutes via a PICA branch. 4.High-grade focal stenosis at the origin of the right subclavian artery estimated to be greater than 50%.  5.High-grade stenosis at the origin of the right common carotid artery estimated to be 70%. 6.High-grade stenosis at the origin of the left A1 segment. 7.Moderate grade stenosis in the mid right V4 segment. 8.Patchy airspace disease in the visualized upper lungs with small bilateral pleural effusions. This could reflect pulmonary edema or pneumonia. Electronically Signed: Serjio Troy MD  2/22/2025 9:22 PM EST  Workstation ID: [id number]    CT Angiogram Head w AI Analysis of LVO    Result Date: 2/22/2025  Impression: 1.Severe densely calcified plaque at the right carotid bifurcation extending into the proximal ICA resulting in high-grade stenosis and possibly short segment occlusion. The degree of stenosis is certainly greater than 70%, but difficult to measure due to heavy calcification and some streak artifact in this area. 2.Severe calcified plaque at the left carotid bifurcation extending into the proximal ICA resulting in high-grade stenosis estimated to be 70%. 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 The left V4 segment reconstitutes via a PICA branch. 4.High-grade focal stenosis at the origin of the right subclavian artery estimated to be greater than 50%. 5.High-grade stenosis at the origin of the right common carotid artery estimated to be 70%. 6.High-grade stenosis at the origin of the left A1 segment. 7.Moderate grade stenosis in the mid right V4 segment. 8.Patchy airspace disease in the visualized upper lungs with small bilateral pleural effusions. This could reflect pulmonary edema or pneumonia. Electronically Signed: Serjio Troy MD  2/22/2025 9:22 PM EST  Workstation ID: [id number]    CT CEREBRAL PERFUSION WITH & WITHOUT CONTRAST    Result Date: 2/22/2025  Impression: 1. No definite core infarct or ischemic penumbra. 2. There is prolongation of Tmax in the right MCA distribution, which likely corresponds to perfusion abnormalities arising from high-grade right  carotid stenosis seen on CTA. Electronically Signed: Serjio Troy MD  2/22/2025 8:37 PM EST  Workstation ID: [id number]    CT Head Without Contrast Stroke Protocol    Result Date: 2/22/2025  Impression: 1.No acute intracranial abnormality. 2.Mild chronic small vessel ischemic change. 3.Extensive intracranial atherosclerotic calcifications. Electronically Signed: Serjio Troy MD  2/22/2025 8:10 PM EST  Workstation ID: [id number]     I reviewed the patient's new clinical results.    ________________________________________________      PROBLEM LIST:    Bladder cancer    Bladder mass        ASSESSMENT/PLAN:    Mild left facial droop   Left hemiparesis, ataxia and dysarthria- resolved  Suspect small right sided infarct versus transient ischemic attack.     - Refuses MRI due to severe claustrophobia  - CTA h/n reviewed  - TTE 61 to 65%-Dr. Milton with cardiology following patient   - Permissive hypertension, do not drop systolic blood pressure less than 130  - Check A1c, B12, TSH, lipid panel  - Dr. Kevin Rodriguez urologist okay with both ASA and Plavix   - PT/OT     Bilateral carotid disease, severe  - vascular team consulted, ordered carotid duplex  - Continue DAPT and Lipitor 40  - Further recommendations pending vascular      Plan  Dual antiplatelets, Lipitor 40 mg daily  Vascular following planning for carotid duplex further recommendations pending  Patient needs to follow-up with Stroke clinic outpatient         There are no further recommendations. Will sign off, please call with any questions or concerns.    I discussed the patient's findings and my recommendations with patient and nursing staff    TOREY Pool  02/24/25  10:39 EST

## 2025-02-24 NOTE — CONSULTS
Name: Dave Peguero ADMIT: 2025   : 1940  PCP: Fidel Le MD    MRN: [id number] LOS: 2 days   AGE/SEX: 84 y.o. male  ROOM: [address] [place] [address]      Patient Care Team:  Fidel Le MD as PCP - General (Internal Medicine)  Kristin Krause MD as PCP - Family Medicine  No chief complaint on file.      CC: Carotid stenosis    Subjective     Inpatient Vascular Surgery Consult  Consult performed by: Mari Gaston APRN  Consult ordered by: Ryan Milton MD          History of Present Illness  Dave Peguero is a 84 y.o. male with a past medical history of hypertension, hyperlipidemia, diabetes mellitus, CAD status post CABG, PAD status post right leg bypass in left leg stenting, CHF, carotid stenosis, and tobacco abuse who presented to [facility] on [facility]25 for an elective TURP.  Postprocedure he was noted to have fluid overload with an elevated BNP.  Cardiology was consulted and he was started on diuretics.  Also with GWEN on CKD, nephrology following for this.  On the night of  he was noted to have left-sided weakness and code stroke was called.  CTA of the head and neck showed severe high-grade stenosis in the bilateral internal carotid arteries, occlusion of the left vertebral artery, and high-grade intracranial stenosis.  Vascular surgery was consulted.  The patient was followed by Dr. Ward of our practice and underwent a right leg bypass over 25 years ago.  He reports being told at that time he had severe carotid stenosis but declined any intervention.  He denies a history of TIA/CVA.  He was maintained on Plavix prior to admission.  He tells me that he quit smoking 2 weeks ago.  He currently denies any focal neurologic symptoms.    Review of Systems   Eyes:  Negative for visual disturbance.   Neurological:  Negative for facial asymmetry, speech difficulty and weakness.       Past Medical History:   Diagnosis Date    Arthritis     Atherosclerosis of  autologous vein bypass graft of right lower extremity with intermittent claudication     Atherosclerosis of native arteries of extremities with intermittent claudication, left leg     Cancer     breast cancer hx    Carotid artery stenosis     Cerebral vascular disease     CHF (congestive heart failure)     Coronary artery disease     Diabetes mellitus     type 1    Hyperlipidemia     Hypertension     Kidney stone     Myocardial infarction     x 3     Past Surgical History:   Procedure Laterality Date    CARDIAC CATHETERIZATION      CORONARY ARTERY BYPASS GRAFT      TRIPLE    DENTAL PROCEDURE      all teeth have been pulled    FEMORAL ARTERY STENT Bilateral     JOINT REPLACEMENT Right     knee    PROSTATE SURGERY      TRANSURETHRAL RESECTION OF BLADDER TUMOR N/A 2/20/2025    Procedure: CYSTOSCOPY TRANSURETHRAL RESECTION OF BLADDER TUMOR;  Surgeon: Kevin Rodriguez MD;  Location: [facility] OR;  Service: Urology;  Laterality: N/A;     Family History   Problem Relation Age of Onset    COPD Mother     Heart disease Mother     Stroke Mother     Hypertension Mother     Cancer Father        Social History     Tobacco Use    Smoking status: Former     Current packs/day: 0.50     Types: Cigarettes    Smokeless tobacco: Never   Vaping Use    Vaping status: Never Used   Substance Use Topics    Alcohol use: No    Drug use: No     Medications Prior to Admission   Medication Sig Dispense Refill Last Dose/Taking    amLODIPine (NORVASC) 5 MG tablet Take 1 tablet by mouth 2 (Two) Times a Day.   Taking    aspirin 81 MG EC tablet Take 1 tablet by mouth Daily.   Taking    atorvastatin (LIPITOR) 20 MG tablet Take 1 tablet by mouth Every Evening.   Taking    calcium carbonate (OS-RUTH) 600 MG tablet Take 1 tablet by mouth 2 (Two) Times a Day With Meals.   Taking    carvedilol (COREG) 25 MG tablet Take 1 tablet by mouth 2 (Two) Times a Day With Meals. 60 tablet 1 Taking    cetirizine (zyrTEC) 10 MG tablet Take 1 tablet by mouth Daily. 90  tablet 1 Taking    clopidogrel (PLAVIX) 75 MG tablet Take 1 tablet by mouth Daily.   Taking    hydrALAZINE (APRESOLINE) 10 MG tablet Take 1 tablet by mouth 3 (Three) Times a Day. 270 tablet 0 Taking    isosorbide mononitrate (IMDUR) 30 MG 24 hr tablet Take 1 tablet by mouth Daily. 30 tablet 1 Taking    minoxidil (LONITEN) 2.5 MG tablet Take 1 tablet by mouth Daily. 30 tablet 0 Taking    pantoprazole (PROTONIX) 40 MG EC tablet Take 1 tablet by mouth Daily.   Taking    sucralfate (CARAFATE) 1 g tablet Take 1 tablet by mouth Every Other Day.   Taking     amLODIPine, 5 mg, Oral, BID  atorvastatin, 40 mg, Oral, Nightly  carvedilol, 12.5 mg, Oral, BID With Meals  cetirizine, 10 mg, Oral, Daily  clopidogrel, 75 mg, Oral, Daily  [Held by provider] hydrALAZINE, 10 mg, Oral, TID  insulin lispro, 2-9 Units, Subcutaneous, 4x Daily AC & at Bedtime  [Held by provider] isosorbide mononitrate, 30 mg, Oral, Q24H  [Held by provider] minoxidil, 2.5 mg, Oral, Q24H  nicotine, 1 patch, Transdermal, Q24H  pantoprazole, 40 mg, Oral, Daily  polyethylene glycol, 34 g, Oral, Daily  predniSONE, 20 mg, Oral, Daily  sucralfate, 1 g, Oral, Every Other Day  tamsulosin, 0.4 mg, Oral, Daily           acetaminophen **OR** acetaminophen    dextrose    dextrose    glucagon (human recombinant)    HYDROcodone-acetaminophen    ipratropium-albuterol    ondansetron ODT **OR** ondansetron  Benadryl [diphenhydramine]    Objective     Physical Exam:   NAD, alert and oriented, tongue midline, moves all extremities equally  RRR  Lungs clear  Abd soft, benign  Vascular: Palpable bilateral radial and pedal pulses  Skin: Warm to touch and dry    Vital Signs and Labs:  Vital Signs Patient Vitals for the past 24 hrs:   BP Temp Temp src Pulse Resp SpO2   02/24/25 0959 (!) 141/107 -- -- 82 -- 94 %   02/24/25 0743 (!) 190/81 97.6 °F (36.4 °C) Oral 84 20 96 %   02/24/25 0300 179/70 -- -- 81 20 91 %   02/23/25 2350 169/82 -- -- 79 -- 90 %   02/23/25 2141 156/59 97.7 °F  (36.5 °C) Oral 70 22 94 %   02/23/25 1635 157/68 98.1 °F (36.7 °C) Oral 80 16 93 %     BMI:  Body mass index is 29.66 kg/m².    CBC    Results from last 7 days   Lab Units 02/24/25  0529 02/23/25  0429 02/22/25  1037 02/20/25  2229   WBC 10*3/mm3 9.33 7.29 9.89 8.51   HEMOGLOBIN g/dL 8.8* 8.5* 9.1* 8.7*   PLATELETS 10*3/mm3 178 170 181 166     BMP   Results from last 7 days   Lab Units 02/24/25  0529 02/23/25  0429 02/22/25  1037 02/20/25  2229   SODIUM mmol/L 138 136 136 136   POTASSIUM mmol/L 4.5 4.5 4.8 4.9   CHLORIDE mmol/L 104 102 104 104   CO2 mmol/L 20.9* 20.8* 21.5* 21.4*   BUN mg/dL 39* 39* 40* 40*   CREATININE mg/dL 2.65* 2.88* 3.09* 2.74*   GLUCOSE mg/dL 155* 168* 171* 368*     Coag     HbA1C   Lab Results   Component Value Date    HGBA1C 7.55 (H) 02/24/2025    HGBA1C 7.6 (H) 09/13/2024     Infection     Radiology(recent) CT Angiogram Neck    Result Date: 2/22/2025  Impression: 1.Severe densely calcified plaque at the right carotid bifurcation extending into the proximal ICA resulting in high-grade stenosis and possibly short segment occlusion. The degree of stenosis is certainly greater than 70%, but difficult to measure due to heavy calcification and some streak artifact in this area. 2.Severe calcified plaque at the left carotid bifurcation extending into the proximal ICA resulting in high-grade stenosis estimated to be 70%. 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 The left V4 segment reconstitutes via a PICA branch. 4.High-grade focal stenosis at the origin of the right subclavian artery estimated to be greater than 50%. 5.High-grade stenosis at the origin of the right common carotid artery estimated to be 70%. 6.High-grade stenosis at the origin of the left A1 segment. 7.Moderate grade stenosis in the mid right V4 segment. 8.Patchy airspace disease in the visualized upper lungs with small bilateral pleural effusions. This could reflect pulmonary edema or  pneumonia. Electronically Signed: Serjio Troy MD  2/22/2025 9:22 PM EST  Workstation ID: [id number]    CT Angiogram Head w AI Analysis of LVO    Result Date: 2/22/2025  Impression: 1.Severe densely calcified plaque at the right carotid bifurcation extending into the proximal ICA resulting in high-grade stenosis and possibly short segment occlusion. The degree of stenosis is certainly greater than 70%, but difficult to measure due to heavy calcification and some streak artifact in this area. 2.Severe calcified plaque at the left carotid bifurcation extending into the proximal ICA resulting in high-grade stenosis estimated to be 70%. 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 The left V4 segment reconstitutes via a PICA branch. 4.High-grade focal stenosis at the origin of the right subclavian artery estimated to be greater than 50%. 5.High-grade stenosis at the origin of the right common carotid artery estimated to be 70%. 6.High-grade stenosis at the origin of the left A1 segment. 7.Moderate grade stenosis in the mid right V4 segment. 8.Patchy airspace disease in the visualized upper lungs with small bilateral pleural effusions. This could reflect pulmonary edema or pneumonia. Electronically Signed: Serjio Troy MD  2/22/2025 9:22 PM EST  Workstation ID: [id number]    CT CEREBRAL PERFUSION WITH & WITHOUT CONTRAST    Result Date: 2/22/2025  Impression: 1. No definite core infarct or ischemic penumbra. 2. There is prolongation of Tmax in the right MCA distribution, which likely corresponds to perfusion abnormalities arising from high-grade right carotid stenosis seen on CTA. Electronically Signed: Serjio Troy MD  2/22/2025 8:37 PM EST  Workstation ID: [id number]    CT Head Without Contrast Stroke Protocol    Result Date: 2/22/2025  Impression: 1.No acute intracranial abnormality. 2.Mild chronic small vessel ischemic change. 3.Extensive intracranial atherosclerotic  calcifications. Electronically Signed: Serjio Troy MD  2/22/2025 8:10 PM EST  Workstation ID: [id number]     VTE Prophylaxis:  Mechanical VTE prophylaxis orders are present.        Active Hospital Problems    Diagnosis  POA    **Bladder cancer [C67.9]  Yes    Bladder mass [N32.89]  Yes      Resolved Hospital Problems   No resolved problems to display.       Assessment & Plan   Assessment / Plan     Bladder cancer    Bladder mass      84 y.o. male with history of CAD, CHF, PAD, and tobacco abuse who presents for an elective TURP.  Postoperative course complicated by GWEN on CKD and CHF exacerbation.  Left-sided weakness and facial droop developed on 2/22, CT of the head and neck shows high-grade stenosis of the bilateral internal carotid arteries.  Patient unable to undergo MRI due to severe claustrophobia.  He is no further focal neurologic symptoms.  We will obtain a carotid duplex to further evaluate vasculature and degree of stenosis.  Recommend continuing aspirin, Plavix, and statin.    Thank you for this consultation.         TOREY Levi  [facility] [place]  02/24/25   O: [phone]  F: [phone]

## 2025-02-24 NOTE — PROGRESS NOTES
"[facility]      Patient Care Team:  Fidel Le MD as PCP - General (Internal Medicine)  Kristin Krause MD as PCP - Family Medicine        Chief Complaint: Bladder cancer    Subjective    Patient seen as he is sitting up in recliner, he is anxious to be discharged.  He has no complaints, denies chest pain, dyspnea on exertion, diaphoresis or dizziness with ambulation or other.    Interval History and ROS:     Patient had postop TIA.  Left-sided weakness and ataxia have resolved. Following blood pressures closely, permissive hypertension systolic to be kept greater than 130, less than 160    Review of Systems   Constitutional: Negative for diaphoresis and malaise/fatigue.   Cardiovascular:  Negative for chest pain, dyspnea on exertion, irregular heartbeat, leg swelling, near-syncope, orthopnea, palpitations, paroxysmal nocturnal dyspnea and syncope.   Respiratory:  Negative for cough, hemoptysis, shortness of breath and sleep disturbances due to breathing.    Skin:  Negative for color change.   Musculoskeletal:  Negative for joint swelling, muscle weakness and myalgias.   Gastrointestinal:  Negative for abdominal pain, hematemesis, hematochezia, melena, nausea and vomiting.   Genitourinary:  Negative for dysuria and hematuria.   Neurological:  Negative for dizziness, focal weakness, headaches, light-headedness, loss of balance, numbness, paresthesias, vertigo and weakness.   Psychiatric/Behavioral:  Negative for altered mental status.    All other systems reviewed and are negative.      Objective    Vital Signs  Temp:  [97.6 °F (36.4 °C)-98.1 °F (36.7 °C)] 97.6 °F (36.4 °C)  Heart Rate:  [70-84] 82  Resp:  [16-22] 20  BP: (141-190)/() 141/107  Oxygen Therapy  SpO2: 94 %  Pulse Oximetry Type: Continuous  Device (Oxygen Therapy): nasal cannula  Flow (L/min) (Oxygen Therapy): 2}  Flowsheet Rows      Flowsheet Row First Filed Value   Admission Height 175.3 cm (69\") Documented at 02/14/2025 1449 "   Admission Weight 90.7 kg (200 lb) Documented at 02/14/2025 1449          TELE: SR  Physical Exam  Constitutional:       General: He is not in acute distress.     Appearance: Normal appearance. He is normal weight. He is diaphoretic.   HENT:      Head: Normocephalic and atraumatic.   Eyes:      Extraocular Movements: Extraocular movements intact.      Pupils: Pupils are equal, round, and reactive to light.   Cardiovascular:      Rate and Rhythm: Normal rate and regular rhythm.      Pulses: Normal pulses.      Heart sounds: Normal heart sounds.   Pulmonary:      Effort: Pulmonary effort is normal.      Breath sounds: Normal breath sounds.   Abdominal:      Palpations: Abdomen is soft.      Tenderness: There is no abdominal tenderness.   Musculoskeletal:      Right lower leg: No edema.      Left lower leg: No edema.   Skin:     General: Skin is warm.   Neurological:      Mental Status: He is alert and oriented to person, place, and time.      Motor: No weakness.   Psychiatric:         Mood and Affect: Mood normal.         Behavior: Behavior normal.         Results Review:     I reviewed the patient's new clinical results.    Lab Results (last 24 hours)       Procedure Component Value Units Date/Time    POC Glucose 4x Daily Before Meals & at Bedtime [[id number]]  (Abnormal) Collected: 02/24/25 1254    Specimen: Blood Updated: 02/24/25 1255     Glucose 268 mg/dL      Comment: Serial Number: [id number]:  [id number]       TSH [[id number]]  (Normal) Collected: 02/24/25 0529    Specimen: Blood Updated: 02/24/25 1136     TSH 1.350 uIU/mL     Lipid Panel [[id number]] Collected: 02/24/25 0529    Specimen: Blood Updated: 02/24/25 1136     Total Cholesterol 134 mg/dL      Triglycerides 117 mg/dL      HDL Cholesterol 53 mg/dL      LDL Cholesterol  60 mg/dL      VLDL Cholesterol 21 mg/dL      LDL/HDL Ratio 1.09    Narrative:      Cholesterol Reference Ranges  (U.S. Department of Health and Human Services ATP III  Classifications)    Desirable          <200 mg/dL  Borderline High    200-239 mg/dL  High Risk          >240 mg/dL      Triglyceride Reference Ranges  (U.S. Department of Health and Human Services ATP III Classifications)    Normal           <150 mg/dL  Borderline High  150-199 mg/dL  High             200-499 mg/dL  Very High        >500 mg/dL    HDL Reference Ranges  (U.S. Department of Health and Human Services ATP III Classifications)    Low     <40 mg/dl (major risk factor for CHD)  High    >60 mg/dl ('negative' risk factor for CHD)        LDL Reference Ranges  (U.S. Department of Health and Human Services ATP III Classifications)    Optimal          <100 mg/dL  Near Optimal     100-129 mg/dL  Borderline High  130-159 mg/dL  High             160-189 mg/dL  Very High        >189 mg/dL    LDL is calculated using the NIH LDL-C calculation.      Hemoglobin A1c [[id number]]  (Abnormal) Collected: 02/24/25 0529    Specimen: Blood Updated: 02/24/25 1128     Hemoglobin A1C 7.55 %     POC Glucose Once [[id number]]  (Abnormal) Collected: 02/24/25 0834    Specimen: Blood Updated: 02/24/25 0836     Glucose 244 mg/dL      Comment: Serial Number: [id number]:  [id number]       Basic Metabolic Panel [[id number]]  (Abnormal) Collected: 02/24/25 0529    Specimen: Blood Updated: 02/24/25 0623     Glucose 155 mg/dL      BUN 39 mg/dL      Creatinine 2.65 mg/dL      Sodium 138 mmol/L      Potassium 4.5 mmol/L      Chloride 104 mmol/L      CO2 20.9 mmol/L      Calcium 8.8 mg/dL      BUN/Creatinine Ratio 14.7     Anion Gap 13.1 mmol/L      eGFR 22.8 mL/min/1.73     Narrative:      GFR Categories in Chronic Kidney Disease (CKD)      GFR Category          GFR (mL/min/1.73)    Interpretation  G1                     90 or greater         Normal or high (1)  G2                      60-89                Mild decrease (1)  G3a                   45-59                Mild to moderate decrease  G3b                   30-44                 Moderate to severe decrease  G4                    15-29                Severe decrease  G5                    14 or less           Kidney failure          (1)In the absence of evidence of kidney disease, neither GFR category G1 or G2 fulfill the criteria for CKD.    eGFR calculation 2021 CKD-EPI creatinine equation, which does not include race as a factor    CBC & Differential [[id number]]  (Abnormal) Collected: 02/24/25 0529    Specimen: Blood Updated: 02/24/25 0555    Narrative:      The following orders were created for panel order CBC & Differential.  Procedure                               Abnormality         Status                     ---------                               -----------         ------                     CBC Auto Differential[[id number]]        Abnormal            Final result                 Please view results for these tests on the individual orders.    CBC Auto Differential [[id number]]  (Abnormal) Collected: 02/24/25 0529    Specimen: Blood Updated: 02/24/25 0555     WBC 9.33 10*3/mm3      RBC 3.05 10*6/mm3      Hemoglobin 8.8 g/dL      Hematocrit 27.1 %      MCV 88.9 fL      MCH 28.9 pg      MCHC 32.5 g/dL      RDW 12.9 %      RDW-SD 42.1 fl      MPV 10.6 fL      Platelets 178 10*3/mm3      Neutrophil % 77.0 %      Lymphocyte % 9.5 %      Monocyte % 11.0 %      Eosinophil % 1.6 %      Basophil % 0.3 %      Immature Grans % 0.6 %      Neutrophils, Absolute 7.17 10*3/mm3      Lymphocytes, Absolute 0.89 10*3/mm3      Monocytes, Absolute 1.03 10*3/mm3      Eosinophils, Absolute 0.15 10*3/mm3      Basophils, Absolute 0.03 10*3/mm3      Immature Grans, Absolute 0.06 10*3/mm3      nRBC 0.0 /100 WBC     POC Glucose Once [[id number]]  (Abnormal) Collected: 02/23/25 2058    Specimen: Blood Updated: 02/23/25 2100     Glucose 279 mg/dL      Comment: Serial Number: [id number]:  [id number]       POC Glucose Once [[id number]]  (Abnormal) Collected: 02/23/25 1633    Specimen: Blood Updated: 02/23/25  1639     Glucose 196 mg/dL      Comment: Serial Number: [id number]:  [id number]               Imaging Results (Last 24 Hours)       Procedure Component Value Units Date/Time    XR Chest 1 View [[id number]] Collected: 02/24/25 1030     Updated: 02/24/25 1033    Narrative:      XR CHEST 1 VW    Date of Exam: 2/22/2025 12:47 AM EST    Indication: concern for pulmonary edema    Comparison: 2/10/2025    Findings:  There are interstitial opacities and likely small bilateral pleural effusions. Cardiomegaly status post CABG. Mediastinal contour is unchanged.      Impression:      Impression:  Interstitial opacities and small bilateral pleural effusions favored to represent pulmonary edema.        Electronically Signed: Dinh Landa MD    2/24/2025 10:31 AM EST    Workstation ID: [id number]            ECG/EMG Results (most recent)       Procedure Component Value Units Date/Time    Telemetry Scan [[id number]] Resulted: 02/20/25     Updated: 02/24/25 1020    Telemetry Scan [[id number]] Resulted: 02/20/25     Updated: 02/24/25 1034    Telemetry Scan [[id number]] Resulted: 02/20/25     Updated: 02/24/25 1034    Telemetry Scan [[id number]] Resulted: 02/20/25     Updated: 02/24/25 1040    Telemetry Scan [[id number]] Resulted: 02/20/25     Updated: 02/24/25 1125    Telemetry Scan [[id number]] Resulted: 02/20/25     Updated: 02/24/25 1126    Telemetry Scan [[id number]] Resulted: 02/20/25     Updated: 02/24/25 1133    Telemetry Scan [[id number]] Resulted: 02/20/25     Updated: 02/24/25 1247    Telemetry Scan [[id number]] Resulted: 02/20/25     Updated: 02/24/25 1247    Telemetry Scan [[id number]] Resulted: 02/20/25     Updated: 02/24/25 1320            Medication Review:   I have reviewed the patient's current medication list    Current Facility-Administered Medications:     acetaminophen (TYLENOL) tablet 650 mg, 650 mg, Oral, Q4H PRN, 650 mg at 02/24/25 0809 **OR** acetaminophen (TYLENOL) suppository 650 mg, 650 mg, Rectal, Q4H PRN,  Kevin Rodriguez MD    amLODIPine (NORVASC) tablet 5 mg, 5 mg, Oral, BID, Chidi Ramirez MD, 5 mg at 02/24/25 0800    aspirin EC tablet 81 mg, 81 mg, Oral, Daily, Fannie Gu, APRN, 81 mg at 02/24/25 1305    atorvastatin (LIPITOR) tablet 40 mg, 40 mg, Oral, Nightly, Daisy Capellan MD, 40 mg at 02/23/25 2051    carvedilol (COREG) tablet 12.5 mg, 12.5 mg, Oral, BID With Meals, Ryan Milton MD, 12.5 mg at 02/24/25 0800    cetirizine (zyrTEC) tablet 10 mg, 10 mg, Oral, Daily, Kevin Rodriguez MD, 10 mg at 02/24/25 0800    clopidogrel (PLAVIX) tablet 75 mg, 75 mg, Oral, Daily, Ryan Milton MD, 75 mg at 02/24/25 0800    dextrose (D50W) (25 g/50 mL) IV injection 25 g, 25 g, Intravenous, Q15 Min PRN, Naseem Kwok MD    dextrose (GLUTOSE) oral gel 15 g, 15 g, Oral, Q15 Min PRN, Naseem Kwok MD    glucagon (GLUCAGEN) injection 1 mg, 1 mg, Intramuscular, Q15 Min PRN, Naseem Kwok MD    [Held by provider] hydrALAZINE (APRESOLINE) tablet 10 mg, 10 mg, Oral, TID, Kevin Rodriguez MD, 10 mg at 02/23/25 0825    HYDROcodone-acetaminophen (NORCO)  MG per tablet 1 tablet, 1 tablet, Oral, Q4H PRN, Juan Manuel Carmen MD, 1 tablet at 02/23/25 1412    insulin lispro (HUMALOG/ADMELOG) injection 2-9 Units, 2-9 Units, Subcutaneous, 4x Daily AC & at Bedtime, Kevin Rodriguez MD, 6 Units at 02/24/25 1305    ipratropium-albuterol (DUO-NEB) nebulizer solution 3 mL, 3 mL, Nebulization, Q4H PRN, Pawan, Salome WONG, APRN, 3 mL at 02/22/25 1452    [Held by provider] isosorbide mononitrate (IMDUR) 24 hr tablet 30 mg, 30 mg, Oral, Q24H, Kevin Rodriguez MD, 30 mg at 02/22/25 0826    [Held by provider] minoxidil (LONITEN) tablet 2.5 mg, 2.5 mg, Oral, Q24H, Kevin Rodriguez MD, 2.5 mg at 02/22/25 0826    nicotine (NICODERM CQ) 14 MG/24HR patch 1 patch, 1 patch, Transdermal, Q24H, Juan Manuel Carmen MD, 1 patch at 02/24/25 0800    ondansetron ODT (ZOFRAN-ODT) disintegrating tablet 4  mg, 4 mg, Oral, Q6H PRN, 4 mg at 02/21/25 1214 **OR** ondansetron (ZOFRAN) injection 4 mg, 4 mg, Intravenous, Q6H PRN, Kevin Rodriguez MD    pantoprazole (PROTONIX) EC tablet 40 mg, 40 mg, Oral, Daily, Kevin Rodriguez MD, 40 mg at 02/24/25 0800    polyethylene glycol (MIRALAX) packet 34 g, 34 g, Oral, Daily, Juan Manuel Carmen MD, 34 g at 02/24/25 0801    predniSONE (DELTASONE) tablet 20 mg, 20 mg, Oral, Daily, Juan Manuel Carmen MD, 20 mg at 02/24/25 0800    sucralfate (CARAFATE) tablet 1 g, 1 g, Oral, Every Other Day, Kevin Rodriguez MD, 1 g at 02/24/25 0800    tamsulosin (FLOMAX) 24 hr capsule 0.4 mg, 0.4 mg, Oral, Daily, Ryan Sequeira MD, 0.4 mg at 02/24/25 0800      Assessment & Plan   Acute on chronic diastolic CHF  GWEN on CKD  Bladder tumor s/p resection  CAD with bypass remotely, LIMA to LAD and vein graft to the obtuse marginal and acute marginal  History of laser TMR with prior chest pain  Diabetes is comorbidity  Peripheral vascular disease with history of carotid stenosis  Essential hypertension  Hyperlipidemia  Bladder mass status post excision  Moderate aortic valve stenosis by echo February 2025 with preserved EF 60% biplane      CAD/ HLD  No CP today  Remote bypass, LIMA to LAD and vein graft to the obtuse marginal and acute marginal  Stress test 2 weeks ago Mild [place]-infarct ischemiaEF 50 to 55%  ASA/Plavix/BB/Lipitor  Lipid profile today LDL 60    HTN  Permissive HTN, keep SBP > 130 <160  This a.m. blood pressure 141/107. This afternoon 157/73  Remains on amlodipine and Coreg  Imdur minoxidil hydralazine held    VHD  Moderate aortic valve stenosis by echo February 2025 with preserved EF 60% biplane    Bladder mass/CKD  Status post TURBT with urology  voiding trial in a.m., will discharge with Nolen cath in place if fails  Nephrology following for GWEN/CKD  Baseline creatinine 1.8-1.9, 2.6 today    TIA  CTA of the head and neck demonstrates severe carotid disease, severe densely calcified  plaque at the right carotid bifurcation extending into the proximal ICA resulting in high-grade stenosis possibly short segment of occlusion, severe calcified plaque in the left carotid bifurcation high-grade stenosis possible greater than 70%  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  High-grade stenosis focal origin of the right subclavian greater than 50%  High-grade stenosis right common carotid 70%  High-grade stenosis origin left A1  Moderate stenosis right V4  Brain with chronic small vessel ischemic changes  Refused MRI due to claustrophobia  ASA/plavix  Neurology signed off  Vascular consulted   Carotid duplex: Right ICA 50-69% stenosis left ICA 50-69% stenosis  Patient states he spoke to vascular, plans outpatient follow-up  Permissive HTN, keep SBP > 130 <160    PLAN:  Continue to monitor blood pressures closely, at least every shift  Permissive HTN, keep SBP > 130 <160  Uninterrupted telemetry  Hemoglobin 8.8, potassium 4.5 creatinine 2.65-improving and followed by nephrology    Meenu Edwards, TOREY  02/24/25  15:35 EST

## 2025-02-24 NOTE — PROGRESS NOTES
[facility] MEDICINE SERVICE  DAILY PROGRESS NOTE    NAME: Dave Peguero  : 1940  MRN: [id number]      LOS: 2 days     PROVIDER OF SERVICE: Chidi Ramirez MD    Chief Complaint: Bladder cancer    Subjective:     Interval History:  History taken from: patient  Feels better this am, eager to go home today.        Review of Systems:   As above    Objective:     Vital Signs  Temp:  [97.6 °F (36.4 °C)-98.1 °F (36.7 °C)] 97.6 °F (36.4 °C)  Heart Rate:  [70-84] 82  Resp:  [16-22] 20  BP: (141-190)/() 141/107  Flow (L/min) (Oxygen Therapy):  [2] 2   Body mass index is 29.66 kg/m².    Physical Exam  Physical Exam  AOx3 NAD  RRR S1-S2 audible  Lung CTA  Abdomen soft nontender     Diagnostic Data    Results from last 7 days   Lab Units 25  0529 25  1037 25  2229   WBC 10*3/mm3 9.33   < > 8.51   HEMOGLOBIN g/dL 8.8*   < > 8.7*   HEMATOCRIT % 27.1*   < > 27.0*   PLATELETS 10*3/mm3 178   < > 166   GLUCOSE mg/dL 155*   < > 368*   CREATININE mg/dL 2.65*   < > 2.74*   BUN mg/dL 39*   < > 40*   SODIUM mmol/L 138   < > 136   POTASSIUM mmol/L 4.5   < > 4.9   AST (SGOT) U/L  --   --  17   ALT (SGPT) U/L  --   --  13   ALK PHOS U/L  --   --  71   BILIRUBIN mg/dL  --   --  <0.2   ANION GAP mmol/L 13.1   < > 10.6    < > = values in this interval not displayed.       CT Angiogram Neck    Result Date: 2025  Impression: 1.Severe densely calcified plaque at the right carotid bifurcation extending into the proximal ICA resulting in high-grade stenosis and possibly short segment occlusion. The degree of stenosis is certainly greater than 70%, but difficult to measure due to heavy calcification and some streak artifact in this area. 2.Severe calcified plaque at the left carotid bifurcation extending into the proximal ICA resulting in high-grade stenosis estimated to be 70%. 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 The left V4 segment reconstitutes  via a PICA branch. 4.High-grade focal stenosis at the origin of the right subclavian artery estimated to be greater than 50%. 5.High-grade stenosis at the origin of the right common carotid artery estimated to be 70%. 6.High-grade stenosis at the origin of the left A1 segment. 7.Moderate grade stenosis in the mid right V4 segment. 8.Patchy airspace disease in the visualized upper lungs with small bilateral pleural effusions. This could reflect pulmonary edema or pneumonia. Electronically Signed: Serjio Troy MD  2/22/2025 9:22 PM EST  Workstation ID: [id number]    CT Angiogram Head w AI Analysis of LVO    Result Date: 2/22/2025  Impression: 1.Severe densely calcified plaque at the right carotid bifurcation extending into the proximal ICA resulting in high-grade stenosis and possibly short segment occlusion. The degree of stenosis is certainly greater than 70%, but difficult to measure due to heavy calcification and some streak artifact in this area. 2.Severe calcified plaque at the left carotid bifurcation extending into the proximal ICA resulting in high-grade stenosis estimated to be 70%. 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 The left V4 segment reconstitutes via a PICA branch. 4.High-grade focal stenosis at the origin of the right subclavian artery estimated to be greater than 50%. 5.High-grade stenosis at the origin of the right common carotid artery estimated to be 70%. 6.High-grade stenosis at the origin of the left A1 segment. 7.Moderate grade stenosis in the mid right V4 segment. 8.Patchy airspace disease in the visualized upper lungs with small bilateral pleural effusions. This could reflect pulmonary edema or pneumonia. Electronically Signed: Serjio Troy MD  2/22/2025 9:22 PM EST  Workstation ID: [id number]    CT CEREBRAL PERFUSION WITH & WITHOUT CONTRAST    Result Date: 2/22/2025  Impression: 1. No definite core infarct or ischemic penumbra. 2. There is  prolongation of Tmax in the right MCA distribution, which likely corresponds to perfusion abnormalities arising from high-grade right carotid stenosis seen on CTA. Electronically Signed: Serjio Troy MD  2/22/2025 8:37 PM EST  Workstation ID: [id number]    CT Head Without Contrast Stroke Protocol    Result Date: 2/22/2025  Impression: 1.No acute intracranial abnormality. 2.Mild chronic small vessel ischemic change. 3.Extensive intracranial atherosclerotic calcifications. Electronically Signed: Serjio Troy MD  2/22/2025 8:10 PM EST  Workstation ID: [id number]       I reviewed the patient's new clinical results.    Assessment/Plan:     Active and Resolved Problems  Active Hospital Problems    Diagnosis  POA    **Bladder cancer [C67.9]  Yes    Carotid stenosis, bilateral [I65.23]  Yes    Bladder mass [N32.89]  Yes      Resolved Hospital Problems   No resolved problems to display.     Mild left facial droop and dysarthria   Rapidly improved left hemiparesis and ataxia.  Suspect small right sided infarct versus transient ischemic attack.  Neurology on board   Given the patient severe atherosclerotic disease including intracranial atherosclerotic disease, if/when cleared by the surgical team, neurology recommend starting high-dose aspirin 325 mg daily and clopidogrel 75 mg daily for at least 90 days. Will need to follow-up outpatient with vascular neurology after discharge.  -Vascular surgery consulted for severe carotid disease and high-grade stenoses, recommended outpatient follow up  -Increase atorvastatin to 40 mg daily LDL 84  -Permissive blood pressure as able for 24-48 hours  -Every 4 hour neurochecks      Acute hypoxic respiratory failure  Wheeze, question of COPD  Acute heart failure exacerbation  GWEN, hypovolemic?  -Improving with diuresis, continue.    - Continue 20 mg p.o. prednisone to complete 5 days.  Wean oxygen as tolerated   -Nephrology on board, appreciate recs      Bladder mass  -UA as of 2/10/2025:  21-50 RBCs, 6-10 WBCs, nitrite negative, bacteria negative  -Urine protein creatinine ratio 5, 969  -Postop day 2 of cystoscopy transurethral resection of bladder tumor  -Pain control appropriate.     Constipation  -MiraLAX ordered, suppository as available.     GWEN on CKD  CKD  Trend renal function labs.  See above     CAD  PVD  HLD  -Continue statin, metoprolol.    -Appreciate urology's recommendations on when to resume AC     DM2  -Accu-Cheks before meals and at bedtime  -SSI while inpatient  -CCD     Tobacco use  -Tobacco cessation  -Nicotine patch         VTE Prophylaxis:  Mechanical VTE prophylaxis orders are present.             Disposition Planning:     As per primary  Renal, vascular, neuro and cardio following       Time: 35 minutes     Code Status and Medical Interventions: No CPR (Do Not Attempt to Resuscitate); Limited Support; No intubation (DNI)   Ordered at: 02/20/25 1183     Medical Intervention Limits:    No intubation (DNI)     Level Of Support Discussed With:    Patient     Code Status (Patient has no pulse and is not breathing):    No CPR (Do Not Attempt to Resuscitate)     Medical Interventions (Patient has pulse or is breathing):    Limited Support       Signature: Electronically signed by Chidi Ramirez MD, 02/24/25, 14:38 EST.  [facility] Hospitalist Team

## 2025-02-24 NOTE — CASE MANAGEMENT/SOCIAL WORK
Case Management Readmission Assessment Note    Case Management Readmission Assessment (all recorded)       Readmission Interview       [facility] Name 02/24/25 1532             Readmission Indications    Is the patient and/or family able to complete the readmission assessment questions? Yes      Is this hospitalization related to the prior hospital diagnosis? Yes        Row Name 02/24/25 1532             Recommendation for rehospitalization    Did you speak with your physician prior to coming to the hospital Yes      If yes, what physician did you speak with? Scheduled as OBS-sx with Dr Rodriguez        [facility] Name 02/24/25 1532             Follow-up Appointments    Do you have a PCP? Yes      Did you have an appointment with PCP after your hospitalization? No      Did you have an appointment with a Specialist? Yes  By phone      When was your appointment scheduled? 02/14/25  By phone.      Did you go to appointment? --  Telephone      Are you current with the Pulmonary Clinic? No      Are you current with the CHF Clinic? No        Row Name 02/24/25 1532             Medications    Did you have newly prescribed medications at discharge? Yes      Did you understand the reasons for your medications at discharge and how to take them? Yes      Are you taking all of you prescribed medications? Yes      What pharmacy was used to fill prescription(s)? [facility]      Were medications picked up? Yes        Row Name 02/24/25 1532             Discharge Instructions    Did you understand your discharge instructions? Yes      Did your family/caregiver hear your instructions? No      Were you told to eat a special diet? Yes      Did you adhere to the diet? Yes      Were you given a number of someone to call if you had questions or concerns? Yes        Row Name 02/24/25 1532             Index discharge location/services    Where did you go upon discharge? Home      Do you have supportive family or friends in the home? Yes      What  services were arranged at discharge? Hospice        Row Name 02/24/25 1532             Discharge Readiness    On a scale of 1-5 (5 being well prepared), how ready were you for discharge 5      Recommendation based on interview Other (comment)  None.  Was only to return for OBS stay.  Developed complications post op        Row Name 02/24/25 1532             Palliative Care/Hospice    Are you current with Palliative Care? No      Are you current with Hospice Care? Yes      Which Hospice Company? [facility]        Row Name 02/20/25 1724 02/20/25 1130          Advance Directives (For Healthcare)    Pre-existing AND/MOST/POLST Order Yes, notify physician for order Yes, notify physician for order     Advance Directive Status Patient has advance directive, copy in chart Patient has advance directive, copy in chart     Type of Advance Directive Durable power of  for health care Durable power of  for health care     Have you reviewed your Advance Directive and is it valid for this stay? Yes --     Literature Provided on Advance Directives No --     Patient Requests Assistance on Advance Directives Patient Declined --       Row Name 02/24/25 1532             Readmission Assessment Final Comments    Final Comments 2/10-2/13/25 previous stay: Presented from Urology office with elevated blood pressure. He blood pressures were significantly elevated on presentation to ED, and improved with additional antihypertensives. He was seen by cardiology for ischemic eval for pre-op consideration for bladder mass.  Stress test was was not felt to represent any acute ischemic changes. He was on antiplatelet therapy which would need to be held for 5 days prior to sx. He was also seen by Nephrology for cr of 2.23. This was felt to be his baseline.  His symptoms improved.  It was decided he could be sent home on oral antihypertensives and followed up by urology for planned surgery.  There were three new medications  that were started, and four that were stopped.  He picked up his meds from [facility] prior to d/c and stated he took them as prescribed.  He was discharged home with [facility]. They were following him for Cardiac diagnosis.     ( 2/22/24-current admission):  He was admitted for planned sx as OBS pt on 2/20/25.  He underwent a Transurethral resection of bladder tumor. The plan was to d/c home on 2/21 or 2/22 depending on pain levels/control.  On 2/22, hospitalist was consulted when it was noted he had wheezing, elevated BNP, and C-xray showing pulm edema. He was changed to full admit on 2/22/25. He was placed on supplemental 02, Cardiolohgy was consulted and placed him on 40mg of IV lasix for acute on chronic CHF. Nephrology was consulted for increased BUN and Cr. 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  On 2/22, he was also noted to have some left side weakness. Code stroke was called.  Pt refused MRI due to claustrophobia. He was placed back on dual antiplatelet therapy. Neuro felt it was a TIA vs small rt infart.   Carotid dopplar revealed high grade stenosis.  Will followup with vascular as outpt. Unlikely he will be a candidate for surgery. He will discharge home with meek and voiding trial within 1-2 days.  [facility] will re-admit once home.  RN visits on weekly basis.

## 2025-02-24 NOTE — PROGRESS NOTES
"RENAL/KCC:     LOS: 2 days    Patient Care Team:  Fidel Le MD as PCP - General (Internal Medicine)  Kristin Krause MD as PCP - Family Medicine    Chief Complaint:  GWEN/CKD    Subjective     Interval History:   Chart revuewed  BP remains above goal  Non-oliguric but FC required replacement  No new complaints  Anxious to get home    Objective     Vital Sign Min/Max for last 24 hours  Temp  Min: 97.6 °F (36.4 °C)  Max: 98.1 °F (36.7 °C)   BP  Min: 156/59  Max: 190/81   Pulse  Min: 70  Max: 97   Resp  Min: 16  Max: 22   SpO2  Min: 90 %  Max: 96 %   Flow (L/min) (Oxygen Therapy)  Min: 2  Max: 2   No data recorded     Flowsheet Rows      Flowsheet Row First Filed Value   Admission Height 175.3 cm (69\") Documented at 02/14/2025 1449   Admission Weight 90.7 kg (200 lb) Documented at 02/14/2025 1449            No intake/output data recorded.  I/O last 3 completed shifts:  In: 240 [P.O.:240]  Out: 2125 [Urine:2125]    Physical Exam:  GEN: Awake, NAD  ENT: PERRL, EOMI, MMM  NECK: Supple, no JVD  CHEST: CTAB, no W/R/C  CV: RRR, no M/G/R  ABD: Soft, NT, +BS  SKIN: Warm and Dry  NEURO: CN's intact      WBC WBC   Date Value Ref Range Status   02/24/2025 9.33 3.40 - 10.80 10*3/mm3 Final   02/23/2025 7.29 3.40 - 10.80 10*3/mm3 Final   02/22/2025 9.89 3.40 - 10.80 10*3/mm3 Final      HGB Hemoglobin   Date Value Ref Range Status   02/24/2025 8.8 (L) 13.0 - 17.7 g/dL Final   02/23/2025 8.5 (L) 13.0 - 17.7 g/dL Final   02/22/2025 9.1 (L) 13.0 - 17.7 g/dL Final      HCT Hematocrit   Date Value Ref Range Status   02/24/2025 27.1 (L) 37.5 - 51.0 % Final   02/23/2025 26.7 (L) 37.5 - 51.0 % Final   02/22/2025 28.2 (L) 37.5 - 51.0 % Final      Platlets No results found for: \"LABPLAT\"   MCV MCV   Date Value Ref Range Status   02/24/2025 88.9 79.0 - 97.0 fL Final   02/23/2025 89.0 79.0 - 97.0 fL Final   02/22/2025 89.0 79.0 - 97.0 fL Final          Sodium Sodium   Date Value Ref Range Status   02/24/2025 138 136 - 145 mmol/L Final " "  02/23/2025 136 136 - 145 mmol/L Final   02/22/2025 136 136 - 145 mmol/L Final      Potassium Potassium   Date Value Ref Range Status   02/24/2025 4.5 3.5 - 5.2 mmol/L Final   02/23/2025 4.5 3.5 - 5.2 mmol/L Final   02/22/2025 4.8 3.5 - 5.2 mmol/L Final      Chloride Chloride   Date Value Ref Range Status   02/24/2025 104 98 - 107 mmol/L Final   02/23/2025 102 98 - 107 mmol/L Final   02/22/2025 104 98 - 107 mmol/L Final      CO2 CO2   Date Value Ref Range Status   02/24/2025 20.9 (L) 22.0 - 29.0 mmol/L Final   02/23/2025 20.8 (L) 22.0 - 29.0 mmol/L Final   02/22/2025 21.5 (L) 22.0 - 29.0 mmol/L Final      BUN BUN   Date Value Ref Range Status   02/24/2025 39 (H) 8 - 23 mg/dL Final   02/23/2025 39 (H) 8 - 23 mg/dL Final   02/22/2025 40 (H) 8 - 23 mg/dL Final      Creatinine Creatinine   Date Value Ref Range Status   02/24/2025 2.65 (H) 0.76 - 1.27 mg/dL Final   02/23/2025 2.88 (H) 0.76 - 1.27 mg/dL Final   02/22/2025 3.09 (H) 0.76 - 1.27 mg/dL Final      Calcium Calcium   Date Value Ref Range Status   02/24/2025 8.8 8.6 - 10.5 mg/dL Final   02/23/2025 8.7 8.6 - 10.5 mg/dL Final   02/22/2025 8.8 8.6 - 10.5 mg/dL Final      PO4 No results found for: \"CAPO4\"   Albumin No results found for: \"ALBUMIN\"   Magnesium No results found for: \"MG\"   Uric Acid No results found for: \"URICACID\"        Results Review:     I reviewed the patient's new clinical results.    amLODIPine, 5 mg, Oral, BID  atorvastatin, 40 mg, Oral, Nightly  carvedilol, 12.5 mg, Oral, BID With Meals  cetirizine, 10 mg, Oral, Daily  clopidogrel, 75 mg, Oral, Daily  [Held by provider] hydrALAZINE, 10 mg, Oral, TID  insulin lispro, 2-9 Units, Subcutaneous, 4x Daily AC & at Bedtime  [Held by provider] isosorbide mononitrate, 30 mg, Oral, Q24H  [Held by provider] minoxidil, 2.5 mg, Oral, Q24H  nicotine, 1 patch, Transdermal, Q24H  pantoprazole, 40 mg, Oral, Daily  polyethylene glycol, 34 g, Oral, Daily  predniSONE, 20 mg, Oral, Daily  sucralfate, 1 g, Oral, " Every Other Day  tamsulosin, 0.4 mg, Oral, Daily           Medication Review: Reviewed    Assessment & Plan     1) GWEN - Pre-renal/ATN  2) [id number] - Baseline Cr 1.8-1.9  3) HTN  4) Bladder CA - s/p resection  5) Urinary retention  6) TIA  7) Carotid stenosis    Plan: Cr gradually improving - down to 2.6 today - and volume OK.  Continue off diuretic today.  Lytes OK.  BP meds per Cardiology.  FC per .  Vascular to see.  Will follow.       Luis Romero MD  [facility]  02/24/25  08:17 EST

## 2025-02-25 NOTE — OUTREACH NOTE
Prep Survey      Flowsheet Row Responses   [facility] facility patient discharged from? [facility]   Is LACE score < 7 ? No   Eligibility Not Eligible   What are the reasons patient is not eligible? Hospice/Pallative Care  [[facility]]   Does the patient have one of the following disease processes/diagnoses(primary or secondary)? Other   Prep survey completed? Yes            Azeb LOPEZ - Registered Nurse

## 2025-02-25 NOTE — CASE MANAGEMENT/SOCIAL WORK
Case Management Discharge Note      Final Note: Home with [facility]    Provided Post Acute Provider List?: N/A  N/A Provider List Comment: denies dc needs            Transportation Services  Private: Car    Final Discharge Disposition Code: 50 - home with hospice

## (undated) DEVICE — KT SURG TURNOVER 050

## (undated) DEVICE — SOL IRRG H2O BG 3000ML STRL

## (undated) DEVICE — THE STERILE CAMERA HANDLE COVER IS FOR USE WITH THE STERIS SURGICAL LIGHTING AND VISUALIZATION SYSTEMS.

## (undated) DEVICE — GLV SURG SIGNATURE ESSENTIAL PF LTX SZ7

## (undated) DEVICE — MARKER SKIN W/RULER DUAL: Brand: MEDLINE INDUSTRIES, INC.

## (undated) DEVICE — SYRINGE,TOOMEY,IRRIGATION,70CC,STERILE: Brand: MEDLINE

## (undated) DEVICE — CATH FOL SIMPLASTIC 3WY 24F 30CC

## (undated) DEVICE — PK CYSTO 50

## (undated) DEVICE — PAD, GROUNDING, UNIVERSAL, SPLIT, 9': Brand: MEDLINE

## (undated) DEVICE — BAG,DRAINAGE,4L,A/R TOWER,LL,SLIDE TAP: Brand: MEDLINE

## (undated) DEVICE — TUBING, SUCTION, 1/4" X 12', STRAIGHT: Brand: MEDLINE

## (undated) DEVICE — SOLUTION,WATER,IRRIGATION,1000ML,STERILE: Brand: MEDLINE

## (undated) DEVICE — CONTAINER,SPECIMEN,OR STERILE,4OZ: Brand: MEDLINE

## (undated) DEVICE — Device

## (undated) DEVICE — THE STERILE LIGHT HANDLE COVER IS USED WITH STERIS SURGICAL LIGHTING AND VISUALIZATION SYSTEMS.